# Patient Record
Sex: MALE | Race: BLACK OR AFRICAN AMERICAN | Employment: FULL TIME | URBAN - METROPOLITAN AREA
[De-identification: names, ages, dates, MRNs, and addresses within clinical notes are randomized per-mention and may not be internally consistent; named-entity substitution may affect disease eponyms.]

---

## 2018-08-08 ENCOUNTER — HOSPITAL ENCOUNTER (EMERGENCY)
Age: 48
Discharge: HOME | End: 2018-08-08
Attending: EMERGENCY MEDICINE
Payer: COMMERCIAL

## 2018-08-08 ENCOUNTER — APPOINTMENT (OUTPATIENT)
Dept: GENERAL RADIOLOGY | Age: 48
End: 2018-08-08
Payer: COMMERCIAL

## 2018-08-08 VITALS
SYSTOLIC BLOOD PRESSURE: 169 MMHG | DIASTOLIC BLOOD PRESSURE: 134 MMHG | TEMPERATURE: 97.6 F | RESPIRATION RATE: 18 BRPM | HEART RATE: 71 BPM | OXYGEN SATURATION: 100 %

## 2018-08-08 DIAGNOSIS — J06.9 VIRAL UPPER RESPIRATORY TRACT INFECTION: Primary | ICD-10-CM

## 2018-08-08 DIAGNOSIS — I10 HYPERTENSION, UNSPECIFIED TYPE: ICD-10-CM

## 2018-08-08 LAB
ANION GAP SERPL CALCULATED.3IONS-SCNC: 11 MMOL/L (ref 3–16)
BASOPHILS ABSOLUTE: 0 K/UL (ref 0–0.2)
BASOPHILS RELATIVE PERCENT: 0.6 %
BUN BLDV-MCNC: 12 MG/DL (ref 7–20)
CALCIUM SERPL-MCNC: 9.8 MG/DL (ref 8.3–10.6)
CHLORIDE BLD-SCNC: 104 MMOL/L (ref 99–110)
CO2: 26 MMOL/L (ref 21–32)
CREAT SERPL-MCNC: 0.9 MG/DL (ref 0.9–1.3)
EOSINOPHILS ABSOLUTE: 0.2 K/UL (ref 0–0.6)
EOSINOPHILS RELATIVE PERCENT: 2.8 %
GFR AFRICAN AMERICAN: >60
GFR NON-AFRICAN AMERICAN: >60
GLUCOSE BLD-MCNC: 101 MG/DL (ref 70–99)
HCT VFR BLD CALC: 44.5 % (ref 40.5–52.5)
HEMOGLOBIN: 14.8 G/DL (ref 13.5–17.5)
LYMPHOCYTES ABSOLUTE: 1.6 K/UL (ref 1–5.1)
LYMPHOCYTES RELATIVE PERCENT: 29 %
MCH RBC QN AUTO: 29.3 PG (ref 26–34)
MCHC RBC AUTO-ENTMCNC: 33.2 G/DL (ref 31–36)
MCV RBC AUTO: 88.3 FL (ref 80–100)
MONOCYTES ABSOLUTE: 0.5 K/UL (ref 0–1.3)
MONOCYTES RELATIVE PERCENT: 9.1 %
NEUTROPHILS ABSOLUTE: 3.2 K/UL (ref 1.7–7.7)
NEUTROPHILS RELATIVE PERCENT: 58.5 %
PDW BLD-RTO: 14.1 % (ref 12.4–15.4)
PLATELET # BLD: 174 K/UL (ref 135–450)
PMV BLD AUTO: 9.4 FL (ref 5–10.5)
POTASSIUM SERPL-SCNC: 3.8 MMOL/L (ref 3.5–5.1)
RBC # BLD: 5.04 M/UL (ref 4.2–5.9)
SODIUM BLD-SCNC: 141 MMOL/L (ref 136–145)
WBC # BLD: 5.4 K/UL (ref 4–11)

## 2018-08-08 PROCEDURE — 36415 COLL VENOUS BLD VENIPUNCTURE: CPT

## 2018-08-08 PROCEDURE — 71046 X-RAY EXAM CHEST 2 VIEWS: CPT

## 2018-08-08 PROCEDURE — 99283 EMERGENCY DEPT VISIT LOW MDM: CPT

## 2018-08-08 PROCEDURE — 80048 BASIC METABOLIC PNL TOTAL CA: CPT

## 2018-08-08 PROCEDURE — 85025 COMPLETE CBC W/AUTO DIFF WBC: CPT

## 2018-08-08 RX ORDER — AMLODIPINE BESYLATE 5 MG/1
5 TABLET ORAL DAILY
Qty: 30 TABLET | Refills: 3 | Status: ON HOLD | OUTPATIENT
Start: 2018-08-08 | End: 2021-12-03 | Stop reason: HOSPADM

## 2018-08-08 ASSESSMENT — ENCOUNTER SYMPTOMS
SORE THROAT: 1
GASTROINTESTINAL NEGATIVE: 1
TROUBLE SWALLOWING: 0
SHORTNESS OF BREATH: 0
COUGH: 1

## 2018-08-08 NOTE — ED PROVIDER NOTES
[address] [facility] [address] ENCOUNTER          ATTENDING PHYSICIAN NOTE       Date of evaluation: 8/8/2018    Chief Complaint     Hypertension      History of Present Illness     Dewey Ibanez is a 50 y.o. male who presents to the emergency department complaining of cough and congestion as well as high blood pressure. Patient states over the last 3 days he has been having symptoms of cough and congestion. He has been using over-the-counter cough medication for this without improvement of symptoms. He states today he felt like his blood pressure was elevated. He cannot give me any specific symptoms to say why he felt this but was concerned so came to the emergency department for evaluation. He denies any headache or blurry vision. He denies any chest pain or pressure. He denies any increased or decreased urine output. He states that he had a work physical partial year ago and had a normal blood pressure at this time but does not regularly follow with a primary care provider. Review of Systems     Review of Systems   Constitutional: Positive for fatigue. Negative for chills and fever. HENT: Positive for congestion and sore throat. Negative for trouble swallowing. Respiratory: Positive for cough. Negative for shortness of breath. Cardiovascular: Negative. Gastrointestinal: Negative. Genitourinary: Negative. Musculoskeletal: Negative. Neurological: Negative. All other systems reviewed and are negative. Past Medical, Surgical, Family, and Social History     He has no past medical history on file. He has no past surgical history on file. His family history is not on file. He     Medications     Discharge Medication List as of 8/8/2018  2:43 AM          Allergies     He has No Known Allergies.     Physical Exam     INITIAL VITALS: BP: (!) 169/134, Temp: 97.6 °F (36.4 °C), Pulse: 71, Resp: 18, SpO2: 100 %   Physical Exam   Constitutional: He is oriented to person, Sodium 141 136 - 145 mmol/L    Potassium 3.8 3.5 - 5.1 mmol/L    Chloride 104 99 - 110 mmol/L    CO2 26 21 - 32 mmol/L    Anion Gap 11 3 - 16    Glucose 101 (H) 70 - 99 mg/dL    BUN 12 7 - 20 mg/dL    CREATININE 0.9 0.9 - 1.3 mg/dL    GFR Non-African American >60 >60    GFR African American >60 >60    Calcium 9.8 8.3 - 10.6 mg/dL     RECENT VITALS:  BP: (!) 169/134, Temp: 97.6 °F (36.4 °C), Pulse: 71, Resp: 18, SpO2: 100 %     Procedures     N/A    ED Course     Nursing Notes, Past Medical Hx, Past Surgical Hx, Social Hx, Allergies, and Family Hx were reviewed. The patient was given the following medications:  Orders Placed This Encounter   Medications    amLODIPine (NORVASC) 5 MG tablet     Sig: Take 1 tablet by mouth daily     Dispense:  30 tablet     Refill:  3    Dextromethorphan-guaiFENesin (CORICIDIN HBP CONGESTION/COUGH)  MG CAPS     Sig: Take 1 capsule by mouth every 6 hours as needed (Congestion, cough)     Dispense:  20 capsule     Refill:  0       CONSULTS:  None    MEDICAL DECISION MAKING / ASSESSMENT / Colletta Hi is a 50 y.o. male who presents to the emergency department complaining of cough and nasal congestion that has been going on for 3 days. Patient's symptoms are most consistent with an upper respiratory tract infection. He has no evidence of oropharyngeal abnormalities on exam.  Chest x-ray shows no evidence of pneumonia. Patient will be treated symptomatically for this. Patient was also noted to have elevated blood pressure in the emergency department. He states that his blood pressure was normal on his last work physical but does not routinely follow with a primary care provider. Patient has normal renal function. I will start the patient on Norvasc for blood pressure control and he will be referred to the [facility] resident clinic or follow-up with a primary care provider of his choice for recheck of his blood pressure. Clinical Impression     1.  Viral

## 2019-10-18 ENCOUNTER — HOSPITAL ENCOUNTER (OUTPATIENT)
Dept: GENERAL RADIOLOGY | Age: 49
Discharge: HOME | End: 2019-10-18
Payer: COMMERCIAL

## 2019-10-18 ENCOUNTER — HOSPITAL ENCOUNTER (OUTPATIENT)
Age: 49
Discharge: HOME | End: 2019-10-18
Payer: COMMERCIAL

## 2019-10-18 DIAGNOSIS — R05.9 COUGH: ICD-10-CM

## 2019-10-18 DIAGNOSIS — R50.9 FEVER, UNKNOWN ORIGIN: ICD-10-CM

## 2019-10-18 PROCEDURE — 71046 X-RAY EXAM CHEST 2 VIEWS: CPT

## 2019-10-31 ENCOUNTER — OFFICE VISIT (OUTPATIENT)
Dept: CARDIOLOGY CLINIC | Age: 49
End: 2019-10-31
Payer: COMMERCIAL

## 2019-10-31 VITALS
WEIGHT: 250 LBS | DIASTOLIC BLOOD PRESSURE: 70 MMHG | BODY MASS INDEX: 33.13 KG/M2 | OXYGEN SATURATION: 98 % | HEART RATE: 68 BPM | RESPIRATION RATE: 16 BRPM | SYSTOLIC BLOOD PRESSURE: 120 MMHG | HEIGHT: 73 IN

## 2019-10-31 DIAGNOSIS — I48.0 PAROXYSMAL ATRIAL FIBRILLATION (HCC): ICD-10-CM

## 2019-10-31 DIAGNOSIS — I49.9 IRREGULAR HEART BEAT: Primary | ICD-10-CM

## 2019-10-31 PROCEDURE — 93000 ELECTROCARDIOGRAM COMPLETE: CPT | Performed by: NURSE PRACTITIONER

## 2019-10-31 PROCEDURE — 99204 OFFICE O/P NEW MOD 45 MIN: CPT | Performed by: NURSE PRACTITIONER

## 2019-10-31 RX ORDER — CHLORTHALIDONE 25 MG/1
TABLET ORAL
Refills: 3 | Status: ON HOLD | COMMUNITY
Start: 2019-10-21 | End: 2021-12-03 | Stop reason: HOSPADM

## 2019-10-31 SDOH — HEALTH STABILITY: MENTAL HEALTH: HOW OFTEN DO YOU HAVE A DRINK CONTAINING ALCOHOL?: NEVER

## 2019-11-07 ENCOUNTER — HOSPITAL ENCOUNTER (OUTPATIENT)
Dept: NON INVASIVE DIAGNOSTICS | Age: 49
Discharge: HOME | End: 2019-11-07
Payer: COMMERCIAL

## 2019-11-07 DIAGNOSIS — I48.0 PAROXYSMAL ATRIAL FIBRILLATION (HCC): ICD-10-CM

## 2019-11-07 LAB
LV EF: 55 %
LVEF MODALITY: NORMAL

## 2019-11-07 PROCEDURE — 93306 TTE W/DOPPLER COMPLETE: CPT

## 2020-01-14 NOTE — PROGRESS NOTES
Kali Cho MD   Dextromethorphan-guaiFENesin (CORICIDIN HBP CONGESTION/COUGH)  MG CAPS Take 1 capsule by mouth every 6 hours as needed (Congestion, cough)  Patient not taking: Reported on 10/31/2019 8/8/18   Pranav Brannon MD       Social History:   reports that he has never smoked. He has never used smokeless tobacco. He reports previous alcohol use. He reports previous drug use. Family History:  family history is not on file. Reviewed. Denies family history of sudden cardiac death, arrhythmia, premature CAD    Review of System:    · General ROS: negative for - chills, fever   · Psychological ROS: negative for - anxiety or depression  · Ophthalmic ROS: negative for - eye pain or loss of vision  · ENT ROS: negative for - epistaxis, headaches, nasal discharge, sore throat   · Allergy and Immunology ROS: negative for - hives, nasal congestion   · Hematological and Lymphatic ROS: negative for - bleeding problems, blood clots, bruising or jaundice  · Endocrine ROS: negative for - skin changes, temperature intolerance or unexpected weight changes  · Respiratory ROS: negative for - cough, hemoptysis, pleuritic pain, SOB, sputum changes or wheezing  · Cardiovascular ROS: Per HPI. · Gastrointestinal ROS: negative for - abdominal pain, blood in stools, diarrhea, hematemesis, melena, nausea/vomiting or swallowing difficulty/pain  · Genito-Urinary ROS: negative for - dysuria or incontinence  · Musculoskeletal ROS: negative for - joint swelling or muscle pain  · Neurological ROS: negative for - confusion, dizziness, gait disturbance, headaches, numbness/tingling, seizures, speech problems, tremors, visual changes or weakness  · Dermatological ROS: negative for - rash    Physical Examination:  Vitals:    01/16/20 0942   BP: 106/78   Pulse: 79       · Constitutional: Oriented. No distress. · Head: Normocephalic and atraumatic.    · Mouth/Throat: Oropharynx is clear and moist.   · Eyes: Conjunctivae normal. EOM are normal.   · Neck: Normal range of motion. Neck supple. No rigidity. No JVD present. · Cardiovascular: Normal rate, regular rhythm, S1&S2 and intact distal pulses. · Pulmonary/Chest: Bilateral respiratory sounds. No wheezes. No rhonchi. · Abdominal: Soft. Bowel sounds present. No distension, No tenderness. · Musculoskeletal: No tenderness. No edema    · Lymphadenopathy: Has no cervical adenopathy. · Neurological: Alert and oriented. Cranial nerve appears intact, No Gross deficit   · Skin: Skin is warm and dry. No rash noted. · Psychiatric: Has a normal mood, affect and behavior     Labs:  Reviewed. ECG: reviewed, controlled atrial fibrillation  Studies:   1. Event monitor:  none    2. Echo 11/7/19:   Summary   Normal left ventricular size and wall thickness. Normal left ventricular systolic function with an estimate ejection fraction   of 55%. There are no regional wall motion abnormalities. Indeterminate diastolic function. Bi-atrial enlargement. Estimated pulmonary artery systolic pressure is 18 mmHg assuming a right   atrial pressure of 3 mmHg. No prior echo for comparison. 3. Stress Test:  none    4. Cath:  none    The MCOT, echocardiogram, stress test, and coronary angiography/PCI were reviewed by myself and used for my plan of care. Procedures:  1. DCCV    Assessment/Plan:   1.  AF  2. HTN    Paroxysmal atrial fibrillation, minimally symptomatic  In AF today, rate controlled  Echo (11/2019) with EF of 55% and AKIRA. Will plan for DCCV on 1/21/20  Has been on Eliquis since 10/2019    Follow up 1 month after DCCV to reassess the symptomatology    Thank you for allowing me to participate in the care of Ann Baird. All questions and concerns were addressed to the patient/family. Alternatives to my treatment were discussed. Antwon Fernández RN, am scribing for and in the presence of Dr. Carol Young.  01/16/20 10:30 AM  MORGAN Irizarry

## 2020-01-16 ENCOUNTER — OFFICE VISIT (OUTPATIENT)
Dept: CARDIOLOGY CLINIC | Age: 50
End: 2020-01-16
Payer: COMMERCIAL

## 2020-01-16 ENCOUNTER — PREP FOR PROCEDURE (OUTPATIENT)
Dept: CARDIOLOGY CLINIC | Age: 50
End: 2020-01-16

## 2020-01-16 VITALS
HEIGHT: 77 IN | DIASTOLIC BLOOD PRESSURE: 78 MMHG | HEART RATE: 79 BPM | BODY MASS INDEX: 28.43 KG/M2 | WEIGHT: 240.8 LBS | SYSTOLIC BLOOD PRESSURE: 106 MMHG

## 2020-01-16 PROCEDURE — 93000 ELECTROCARDIOGRAM COMPLETE: CPT | Performed by: INTERNAL MEDICINE

## 2020-01-16 PROCEDURE — 99214 OFFICE O/P EST MOD 30 MIN: CPT | Performed by: INTERNAL MEDICINE

## 2020-01-16 RX ORDER — MIDAZOLAM HYDROCHLORIDE 1 MG/ML
1 INJECTION INTRAMUSCULAR; INTRAVENOUS ONCE
Status: CANCELLED | OUTPATIENT
Start: 2020-01-21

## 2020-01-16 RX ORDER — SODIUM CHLORIDE 0.9 % (FLUSH) 0.9 %
10 SYRINGE (ML) INJECTION EVERY 12 HOURS SCHEDULED
Status: CANCELLED | OUTPATIENT
Start: 2020-01-16

## 2020-01-16 RX ORDER — SODIUM CHLORIDE 9 MG/ML
INJECTION, SOLUTION INTRAVENOUS CONTINUOUS
Status: CANCELLED | OUTPATIENT
Start: 2020-01-16

## 2020-01-16 RX ORDER — SODIUM CHLORIDE 0.9 % (FLUSH) 0.9 %
10 SYRINGE (ML) INJECTION PRN
Status: CANCELLED | OUTPATIENT
Start: 2020-01-16

## 2020-01-17 PROBLEM — I10 ESSENTIAL HYPERTENSION: Status: ACTIVE | Noted: 2020-01-17

## 2020-01-17 PROBLEM — I48.0 PAROXYSMAL ATRIAL FIBRILLATION (HCC): Status: ACTIVE | Noted: 2020-01-17

## 2020-01-20 ENCOUNTER — TELEPHONE (OUTPATIENT)
Dept: CARDIOLOGY CLINIC | Age: 50
End: 2020-01-20

## 2020-01-20 NOTE — TELEPHONE ENCOUNTER
Pt notified the hospital RN that he would like to cancel his DCCV for tomorrow. Called pt and rescheduled DCCV for 1/31/20 at 0800, arriving at [facility] at [address]. Pt is aware that all other instructions given to him at the [address] remain the same. Jasmina from cath lab aware of the change.

## 2020-01-22 ENCOUNTER — PREP FOR PROCEDURE (OUTPATIENT)
Dept: CARDIOLOGY CLINIC | Age: 50
End: 2020-01-22

## 2020-01-29 NOTE — TELEPHONE ENCOUNTER
Pt called back. The number we had on file was old. Updated our records with new number. Pt is not able to easily reschedule DCCV due to his work schedule. MIKE and Estrella Verdin are both agreeable to Estrella Verdin performing the DCCV. Will keep the originally scheduled DCCV for 1/31/20 at 0800 per Estrella Verdin. Pt aware and agreeable to the change. Reviewed preop instructions with the pt given at previous OV.

## 2020-01-30 ENCOUNTER — PRE-PROCEDURE TELEPHONE (OUTPATIENT)
Dept: CARDIAC CATH/INVASIVE PROCEDURES | Age: 50
End: 2020-01-30

## 2020-01-30 NOTE — PROGRESS NOTES
Called patient about procedure. Told to be here at 0630 for procedure at 0800. NPO after midnight, but can take morning medication with sips of water, patient stated they are on xarelto and have not skipped a dose. To have a responsible adult be with patient take them home and stay with them afterwards, if they do not get admitted to [address] [place] [address]. And if available bring current list of medications. No other questions or concerns.

## 2020-01-31 ENCOUNTER — HOSPITAL ENCOUNTER (OUTPATIENT)
Dept: CARDIAC CATH/INVASIVE PROCEDURES | Age: 50
Discharge: HOME | End: 2020-02-02
Payer: COMMERCIAL

## 2020-01-31 VITALS — TEMPERATURE: 97.5 F | WEIGHT: 235 LBS | OXYGEN SATURATION: 100 % | HEIGHT: 77 IN | BODY MASS INDEX: 27.75 KG/M2

## 2020-01-31 LAB
ANION GAP SERPL CALCULATED.3IONS-SCNC: 15 MMOL/L (ref 3–16)
BUN BLDV-MCNC: 20 MG/DL (ref 7–20)
CALCIUM SERPL-MCNC: 9.4 MG/DL (ref 8.3–10.6)
CHLORIDE BLD-SCNC: 101 MMOL/L (ref 99–110)
CO2: 23 MMOL/L (ref 21–32)
CREAT SERPL-MCNC: 1.2 MG/DL (ref 0.9–1.3)
EKG ATRIAL RATE: 375 BPM
EKG ATRIAL RATE: 71 BPM
EKG DIAGNOSIS: NORMAL
EKG DIAGNOSIS: NORMAL
EKG P AXIS: 48 DEGREES
EKG P-R INTERVAL: 220 MS
EKG Q-T INTERVAL: 376 MS
EKG Q-T INTERVAL: 412 MS
EKG QRS DURATION: 72 MS
EKG QRS DURATION: 74 MS
EKG QTC CALCULATION (BAZETT): 428 MS
EKG QTC CALCULATION (BAZETT): 447 MS
EKG R AXIS: -50 DEGREES
EKG R AXIS: 57 DEGREES
EKG T AXIS: 62 DEGREES
EKG T AXIS: 66 DEGREES
EKG VENTRICULAR RATE: 71 BPM
EKG VENTRICULAR RATE: 78 BPM
GFR AFRICAN AMERICAN: >60
GFR NON-AFRICAN AMERICAN: >60
GLUCOSE BLD-MCNC: 105 MG/DL (ref 70–99)
INR BLD: 1.26 (ref 0.86–1.14)
POTASSIUM SERPL-SCNC: 3.6 MMOL/L (ref 3.5–5.1)
PROTHROMBIN TIME: 14.6 SEC (ref 10–13.2)
SODIUM BLD-SCNC: 139 MMOL/L (ref 136–145)

## 2020-01-31 PROCEDURE — 92960 CARDIOVERSION ELECTRIC EXT: CPT | Performed by: INTERNAL MEDICINE

## 2020-01-31 PROCEDURE — 93005 ELECTROCARDIOGRAM TRACING: CPT | Performed by: INTERNAL MEDICINE

## 2020-01-31 PROCEDURE — 94761 N-INVAS EAR/PLS OXIMETRY MLT: CPT

## 2020-01-31 PROCEDURE — 94770 HC ETCO2 MONITOR DAILY: CPT

## 2020-01-31 PROCEDURE — 93010 ELECTROCARDIOGRAM REPORT: CPT | Performed by: INTERNAL MEDICINE

## 2020-01-31 PROCEDURE — 85610 PROTHROMBIN TIME: CPT

## 2020-01-31 PROCEDURE — 80048 BASIC METABOLIC PNL TOTAL CA: CPT

## 2020-01-31 PROCEDURE — 92960 CARDIOVERSION ELECTRIC EXT: CPT

## 2020-01-31 PROCEDURE — 94664 DEMO&/EVAL PT USE INHALER: CPT

## 2020-01-31 PROCEDURE — 2700000000 HC OXYGEN THERAPY PER DAY

## 2020-01-31 RX ORDER — SODIUM CHLORIDE 0.9 % (FLUSH) 0.9 %
10 SYRINGE (ML) INJECTION PRN
Status: DISCONTINUED | OUTPATIENT
Start: 2020-01-31 | End: 2020-02-03 | Stop reason: HOSPADM

## 2020-01-31 RX ORDER — LOSARTAN POTASSIUM 100 MG/1
100 TABLET ORAL DAILY
Status: ON HOLD | COMMUNITY
End: 2021-12-03 | Stop reason: HOSPADM

## 2020-01-31 RX ORDER — SODIUM CHLORIDE 0.9 % (FLUSH) 0.9 %
10 SYRINGE (ML) INJECTION EVERY 12 HOURS SCHEDULED
Status: DISCONTINUED | OUTPATIENT
Start: 2020-01-31 | End: 2020-02-03 | Stop reason: HOSPADM

## 2020-01-31 RX ORDER — MIDAZOLAM HYDROCHLORIDE 1 MG/ML
1 INJECTION INTRAMUSCULAR; INTRAVENOUS ONCE
Status: DISCONTINUED | OUTPATIENT
Start: 2020-01-31 | End: 2020-02-03 | Stop reason: HOSPADM

## 2020-01-31 RX ORDER — SODIUM CHLORIDE 9 MG/ML
INJECTION, SOLUTION INTRAVENOUS CONTINUOUS
Status: DISCONTINUED | OUTPATIENT
Start: 2020-01-31 | End: 2020-02-03 | Stop reason: HOSPADM

## 2020-01-31 NOTE — PROGRESS NOTES
ETCO2  Monitoring done for conscious sedation. Patient is on 2 liters/min via nasal cannula for procedure.     Baseline information:  HR: 64   RR: 17 LOC: alert  ETCO2: 37 SpO2: 100    5 minutes after sedation administered:  HR: 99   RR: 18 LOC: lethargic  ETCO2: 34 SpO2: 100    Post-Procedure:  HR: 65   RR: 16 LOC: alert  ETCO2: 38 SpO2: 100  well    Patient/caregiver was educated on the proper method of use:  Yes      Level of patient/caregiver understanding able to:   [x] Verbalize understanding   [] Demonstrate understanding       [] Teach back        [] Needs reinforcement       []  No available caregiver               []  Other:     Response to education:  Good

## 2020-01-31 NOTE — H&P
Dextromethorphan-guaiFENesin (CORICIDIN HBP CONGESTION/COUGH)  MG CAPS Take 1 capsule by mouth every 6 hours as needed (Congestion, cough)  Patient not taking: Reported on 10/31/2019 8/8/18     Dudley Koroma MD            Social History:   reports that he has never smoked. He has never used smokeless tobacco. He reports previous alcohol use. He reports previous drug use.         Family History:  family history is not on file. Reviewed. Denies family history of sudden cardiac death, arrhythmia, premature CAD     Review of System:     · General ROS: negative for - chills, fever   · Psychological ROS: negative for - anxiety or depression  · Ophthalmic ROS: negative for - eye pain or loss of vision  · ENT ROS: negative for - epistaxis, headaches, nasal discharge, sore throat   · Allergy and Immunology ROS: negative for - hives, nasal congestion   · Hematological and Lymphatic ROS: negative for - bleeding problems, blood clots, bruising or jaundice  · Endocrine ROS: negative for - skin changes, temperature intolerance or unexpected weight changes  · Respiratory ROS: negative for - cough, hemoptysis, pleuritic pain, SOB, sputum changes or wheezing  · Cardiovascular ROS: Per HPI. · Gastrointestinal ROS: negative for - abdominal pain, blood in stools, diarrhea, hematemesis, melena, nausea/vomiting or swallowing difficulty/pain  · Genito-Urinary ROS: negative for - dysuria or incontinence  · Musculoskeletal ROS: negative for - joint swelling or muscle pain  · Neurological ROS: negative for - confusion, dizziness, gait disturbance, headaches, numbness/tingling, seizures, speech problems, tremors, visual changes or weakness  · Dermatological ROS: negative for - rash     Physical Examination:      Vitals:     01/16/20 0942   BP: 106/78   Pulse: 79         · Constitutional: Oriented. No distress. · Head: Normocephalic and atraumatic.    · Mouth/Throat: Oropharynx is clear and moist.   · Eyes: Conjunctivae normal.

## 2020-01-31 NOTE — SEDATION DOCUMENTATION
Sedation start time: 1106  Sedation stop time: 1117  Mallampati: 2  Pre and post Archana score: 10/10  Medication given: IV Versed, IV brevital  Pre-Procedure Assessment / Plan:  ASA: Class 2 - A normal healthy patient with mild systemic disease  Level of Sedation Plan:Deep sedation  Post Procedure plan: Return to same level of care

## 2020-02-27 NOTE — PROGRESS NOTES
[address]   Cardiac Electrophysiology Consultation   Date: 2/27/2020  Reason for Consultation:  Atrial Fibrillation  Consult Requesting Physician: No att. providers found     Chief Complaint:   No chief complaint on file. HPI: Erin Gutierrez is a 48 y.o. man referred by Dr. Jayden Kaur for AF. PMH of HTN. On 10/18/19, pt was at Dr. Jonna Lucas office with 3 day h/o fever/chills, SOB, and dry cough, treated for an URI.  ECG at that OV found AF, rate controlled. He denied CP, palpitations, heart racing, dizziness, lightheadedness, or change in energy. ECG in f/u on 10/31/19 also showed rate controlled AF, asymptomatic, started on Xarelto.  Echo (11/2019) with EF of 55% and AKIRA. S/p DCCV on 1/31/20. Interval History: Today, he is here for 1 month f/u after DCCV, presenting in [employer] [facility]ACKLYN Abel Denies complaints of palpitations, dizziness, CP, SOB, orthopnea, presyncope, or syncope. 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 He continues to be to be active. He plays sports without any functional limitations. Atrial Fibrillation:    BMI    :   There is no height or weight on file to calculate BMI. Duration   :   10/1819    Symptoms   :  Asymptomatic    Previous DCCV :  1/31/20    Previous AAD           :   none    Beta blocker  :   none    ACE / ARB  :   losartan    OAC   :   Xarelto    LVEF    :   55%    Left atrial size :   4.0 cm    AC   :   Not tested    No past medical history on file. No past surgical history on file. Allergies:  No Known Allergies    Medication:   Prior to Admission medications    Medication Sig Start Date End Date Taking? Authorizing Provider   losartan (COZAAR) 100 MG tablet Take 100 mg by mouth daily    Historical Provider, MD   chlorthalidone (HYGROTON) 25 MG tablet TAKE 1 TABLET ON MONDAY. .. [facility] AND FRIDAY 10/21/19   Historical Provider, MD   rivaroxaban (XARELTO) 20 MG TABS tablet Take 1 tablet by mouth daily (with breakfast) 10/31/19   Nigel Carrillo, TITA - CNP   amLODIPine (NORVASC) 5 MG tablet Take 1 tablet by mouth daily 8/8/18   Pranav Brannon MD   Dextromethorphan-guaiFENesin (CORICIDIN HBP CONGESTION/COUGH)  MG CAPS Take 1 capsule by mouth every 6 hours as needed (Congestion, cough)  Patient not taking: Reported on 10/31/2019 8/8/18   Pranav Brannon MD       Social History:   reports that he has never smoked. He has never used smokeless tobacco. He reports previous alcohol use. He reports previous drug use. Family History:  family history is not on file. Reviewed. Denies family history of sudden cardiac death, arrhythmia, premature CAD    Review of System:    · General ROS: negative for - chills, fever   · Psychological ROS: negative for - anxiety or depression  · Ophthalmic ROS: negative for - eye pain or loss of vision  · ENT ROS: negative for - epistaxis, headaches, nasal discharge, sore throat   · Allergy and Immunology ROS: negative for - hives, nasal congestion   · Hematological and Lymphatic ROS: negative for - bleeding problems, blood clots, bruising or jaundice  · Endocrine ROS: negative for - skin changes, temperature intolerance or unexpected weight changes  · Respiratory ROS: negative for - cough, hemoptysis, pleuritic pain, SOB, sputum changes or wheezing  · Cardiovascular ROS: Per HPI. · Gastrointestinal ROS: negative for - abdominal pain, blood in stools, diarrhea, hematemesis, melena, nausea/vomiting or swallowing difficulty/pain  · Genito-Urinary ROS: negative for - dysuria or incontinence  · Musculoskeletal ROS: negative for - joint swelling or muscle pain  · Neurological ROS: negative for - confusion, dizziness, gait disturbance, headaches, numbness/tingling, seizures, speech problems, tremors, visual changes or weakness  · Dermatological ROS: negative for - rash    Physical Examination:  There were no vitals filed for this visit. · Constitutional: Oriented. No distress. · Head: Normocephalic and atraumatic. · Mouth/Throat: Oropharynx is clear and moist.   · Eyes: Conjunctivae normal. EOM are normal.   · Neck: Normal range of motion. Neck supple. No rigidity. No JVD present. · Cardiovascular: Normal rate, regular rhythm, S1&S2 and intact distal pulses. · Pulmonary/Chest: Bilateral respiratory sounds. No wheezes. No rhonchi. · Abdominal: Soft. Bowel sounds present. No distension, No tenderness. · Musculoskeletal: No tenderness. No edema    · Lymphadenopathy: Has no cervical adenopathy. · Neurological: Alert and oriented. Cranial nerve appears intact, No Gross deficit   · Skin: Skin is warm and dry. No rash noted. · Psychiatric: Has a normal mood, affect and behavior     Labs:  Reviewed. ECG: reviewed, controlled atrial fibrillation    Studies:   1. Event monitor:  none    2. Echo 11/7/19:   Summary   Normal left ventricular size and wall thickness. Normal left ventricular systolic function with an estimate ejection fraction   of 55%. There are no regional wall motion abnormalities. Indeterminate diastolic function. Bi-atrial enlargement. Estimated pulmonary artery systolic pressure is 18 mmHg assuming a right   atrial pressure of 3 mmHg. No prior echo for comparison. 3. Stress Test:  none    4. Cath:  none    The MCOT, echocardiogram, stress test, and coronary angiography/PCI were reviewed by myself and used for my plan of care. Procedures:  1. Assessment/Plan:   1.  AF  2. HTN    Paroxysmal atrial fibrillation, minimally symptomatic  In AF today, rate controlled  Echo (11/2019) with EF of 55% and AKIRA. S/p DCCV 1/31/20      Follow up     Thank you for allowing me to participate in the care of Black Jade. All questions and concerns were addressed to the patient/family. Alternatives to my treatment were discussed.            Shira Alarcon MD  Cardiac Electrophysiology  [address]

## 2020-03-03 ENCOUNTER — OFFICE VISIT (OUTPATIENT)
Dept: CARDIOLOGY CLINIC | Age: 50
End: 2020-03-03
Payer: COMMERCIAL

## 2020-03-03 VITALS
BODY MASS INDEX: 28.5 KG/M2 | SYSTOLIC BLOOD PRESSURE: 124 MMHG | WEIGHT: 241.4 LBS | HEART RATE: 63 BPM | DIASTOLIC BLOOD PRESSURE: 88 MMHG | HEIGHT: 77 IN

## 2020-03-03 PROCEDURE — 99215 OFFICE O/P EST HI 40 MIN: CPT | Performed by: INTERNAL MEDICINE

## 2020-03-03 PROCEDURE — 93000 ELECTROCARDIOGRAM COMPLETE: CPT | Performed by: INTERNAL MEDICINE

## 2020-03-03 NOTE — PROGRESS NOTES
[facility]   Cardiac Electrophysiology Consultation   Date: 3/3/2020  Reason for Consultation:  Atrial Fibrillation  Consult Requesting Physician: No att. providers found     Chief Complaint:   Chief Complaint   Patient presents with    Atrial Fibrillation      HPI: Jose Woodruff is a 48 y.o. man referred by Dr. Lynn Arteaga for AF. PMH of HTN. On 10/18/19, pt was at Dr. Yamel Lin office with 3 day h/o fever/chills, SOB, and dry cough, treated for an URI.  ECG at that OV found AF, rate controlled. He denied CP, palpitations, heart racing, dizziness, lightheadedness, or change in energy. ECG in f/u on 10/31/19 also showed rate controlled AF, started on Xarelto.  Echo (11/2019) with EF of 55% and AKIRA. S/p DCCV on 1/31/20. Interval History: Today, he is here for 1 month f/u after DCCV, presenting in [employer]. Patient does feel he has more energy after DCCV. Denies complaints of palpitations, dizziness, CP, SOB, orthopnea, presyncope, or syncope. 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 He continues to be to be active. He plays sports without any functional limitations. Atrial Fibrillation:    BMI    :   Body mass index is 28.63 kg/m². Duration   :   10/1819    Symptoms   : Decline in his functional capacity    Previous DCCV :  1/31/20    Previous AAD           :   none    Beta blocker  :   none    ACE / ARB  :   losartan    OAC   :   Xarelto    LVEF    :   55%    Left atrial size :   4.0 cm    AC   :   Not tested    No past medical history on file. No past surgical history on file. Allergies:  No Known Allergies    Medication:   Prior to Admission medications    Medication Sig Start Date End Date Taking? Authorizing Provider   losartan (COZAAR) 100 MG tablet Take 100 mg by mouth daily   Yes Historical Provider, MD   chlorthalidone (HYGROTON) 25 MG tablet TAKE 1 TABLET ON MONDAY. .. [facility] AND FRIDAY 10/21/19  Yes Historical Provider, MD   rivaroxaban (XARELTO) 20 MG TABS tablet Take 1 tablet by mouth daily (with breakfast) 10/31/19  Yes TITA Quinn CNP   amLODIPine (NORVASC) 5 MG tablet Take 1 tablet by mouth daily 8/8/18  Yes Ivonne Hardin MD   Dextromethorphan-guaiFENesin (CORICIDIN HBP CONGESTION/COUGH)  MG CAPS Take 1 capsule by mouth every 6 hours as needed (Congestion, cough) 8/8/18  Yes Ivonne Hardin MD       Social History:   reports that he has never smoked. He has never used smokeless tobacco. He reports previous alcohol use. He reports previous drug use. Family History:  family history is not on file. Reviewed. Denies family history of sudden cardiac death, arrhythmia, premature CAD    Review of System:    · General ROS: negative for - chills, fever   · Psychological ROS: negative for - anxiety or depression  · Ophthalmic ROS: negative for - eye pain or loss of vision  · ENT ROS: negative for - epistaxis, headaches, nasal discharge, sore throat   · Allergy and Immunology ROS: negative for - hives, nasal congestion   · Hematological and Lymphatic ROS: negative for - bleeding problems, blood clots, bruising or jaundice  · Endocrine ROS: negative for - skin changes, temperature intolerance or unexpected weight changes  · Respiratory ROS: negative for - cough, hemoptysis, pleuritic pain, SOB, sputum changes or wheezing  · Cardiovascular ROS: Per HPI. · Gastrointestinal ROS: negative for - abdominal pain, blood in stools, diarrhea, hematemesis, melena, nausea/vomiting or swallowing difficulty/pain  · Genito-Urinary ROS: negative for - dysuria or incontinence  · Musculoskeletal ROS: negative for - joint swelling or muscle pain  · Neurological ROS: negative for - confusion, dizziness, gait disturbance, headaches, numbness/tingling, seizures, speech problems, tremors, visual changes or weakness  · Dermatological ROS: negative for - rash    Physical Examination:  Vitals:    03/03/20 0916   BP: 124/88   Pulse: 63       · Constitutional: Oriented. No distress. atrial fibrillation ablation    After 3 months of successful ablation, it is possible to stop his oral anticoagulation and antiarrhythmic medications. We educated the patient that atrial fibrillation and atrial flutter are both progressive diseases, with more frequent episodes that will ensue. Subsequent episodes usually become more sustained to the extent that many individuals would then develop persistent atrial fibrillation/atrial flutter. Once persistence is reached, permanent atrial dysrhythmia is inevitable. Treatment options including cardioversion, anti-arrhythmic medication therapy, rate control strategy, oral anticoagulation, and atrial fibrillation ablation were discussed with patient. Risks, benefits and alternative of each treatment options were explained. We discussed different management options for both atrial tachydysrhythmia including their risks and benefits. These options include use of cardioversion (mainly for persisting atrial fibrillation or atrial flutter) which provides an effective immediate therapy with success rates of 75% or higher, but it provides no short nor long term efficacy. Anti-arrhythmic medications provide a very effective short term therapy, but even with our most potent anti-arrhythmic medication there is limited long term efficacy (clinical studies have shown that 40% of patients remain atrial fibrillation-free after 4 years of follow-up after starting one of the more powerful anti-arrhythmic medication (amiodarone), and, if extrapolated, may have further diminishing success as time goes on). Against atrial flutter, any anti-arrhythmic medication would be nearly ineffective. Atrial fibrillation and atrial flutter ablation is a potentially curative therapy with very reasonable success rate after a first time procedure and with improving success rates with subsequent procedures.      The risks, benefits and alternatives of the ablation procedure were discussed with the patient. The risks including, but not limited to, the risks of bleeding, infection, radiation exposure, injury to vascular, cardiac and surrounding structures (including pneumothorax), stroke, cardiac perforation, tamponade, need for emergent open heart surgery, need for pacemaker implantation, injury to the phrenic nerve, injury to the esophagus, myocardial infarction and death were discussed in detail. Discussed in detail about the risk factors, pathophysiology, and treatment options including life stye modification for atrial fibrillation. Treatment options including cardioversion, anti-arrhythmic medication therapy, rate control strategy, oral anticoagulation, and atrial fibrillation ablation were discussed with patient. Risks, benefits and alternative of each treatment options were explained. All questions answered. Will call patient to schedule ablation, would prefer a Monday. Patient needs to take 5 days off after procedure. Thank you for allowing me to participate in the care of Mayuri Estrella. All questions and concerns were addressed to the patient/family. Alternatives to my treatment were discussed. Asiya Urena RN, am scribing for and in the presence of Dr. Tiara Kuhn. 03/03/20 9:39 AM  Edmond Esposito RN.    Janis Morales MD, personally performed the services prescribed in this documentation as scribed by Ms. Galdino Holder RN in my presence and it is both accurate and complete.        Vinod Ho MD  Cardiac Electrophysiology  [place] [id number]

## 2020-03-06 ENCOUNTER — TELEPHONE (OUTPATIENT)
Dept: CARDIOLOGY CLINIC | Age: 50
End: 2020-03-06

## 2020-03-06 ENCOUNTER — PREP FOR PROCEDURE (OUTPATIENT)
Dept: CARDIOLOGY CLINIC | Age: 50
End: 2020-03-06

## 2020-03-06 RX ORDER — SODIUM CHLORIDE 0.9 % (FLUSH) 0.9 %
10 SYRINGE (ML) INJECTION PRN
Status: CANCELLED | OUTPATIENT
Start: 2020-03-06

## 2020-03-06 RX ORDER — SODIUM CHLORIDE 0.9 % (FLUSH) 0.9 %
10 SYRINGE (ML) INJECTION EVERY 12 HOURS SCHEDULED
Status: CANCELLED | OUTPATIENT
Start: 2020-03-06

## 2020-03-06 RX ORDER — SODIUM CHLORIDE 9 MG/ML
INJECTION, SOLUTION INTRAVENOUS CONTINUOUS
Status: CANCELLED | OUTPATIENT
Start: 2020-03-06

## 2020-03-06 NOTE — TELEPHONE ENCOUNTER
Electrophysiology Study (EPS) and Atrial Fibrillation (AFib) Ablation    Date of Procedure: 4/6/20    Time of Arrival: 06:00    Cardiologist performing procedure: Dr. Diya Argueta     · [address] at [facility]. through the main entrance. Check in at the Outpatient Diagnostic desk on the 1st floor. · Do not eat or drink anything after midnight the night before the procedure. · You may brush your teeth and rinse the morning of the procedure. · Take ALL of your routine medications the morning of the procedure. However, if you are taking diabetic medications, please HOLD on the day of the procedure (including insulin). If you take Lantus insulin, take HALF of your usual dose the night before. · Do NOT stop taking aspirin, Plavix, coumadin, Eliquis, Xarelto, or Pradaxa (any blood thinners). However, do not take blood thinner the morning of the procedure. · Do not apply any lotion, powder, or deodorant the morning of the procedure. · Please bring a list of your medications to the hospital with you. · You must have someone available to drive you home the next day. It is recommended that you do not drive for 24 hours after the procedure. · Lab work is due 3 days before the procedure. · CTA pulmonary is due 3 days before the procedure. · If you are unable to make this appointment, please call [facility] Cardiology at [phone].

## 2020-03-06 NOTE — TELEPHONE ENCOUNTER
Spoke with patient and scheduled ablation. Reviewed instructions and pre-op testing. Patient verbalized understanding.

## 2020-03-06 NOTE — LETTER
[employer]  EP Procedure Sheet  3/6/20    [place]  1970    Physician: Dr. Juan Lott     EP Procedures   Pacemaker implant X EP Study    ICD implant  Atrial flutter ablation     Biv implant X Atrial fibrillation ablation    Generator Change  SVT ablation    Lead revision  VT ablation    Lead extraction +/- upgrade  AVN ablation    Loop implant  Cardioversion     Other:   MATI     Equipment   Medtronic   ROSALIND Mapping System    [facility] [address]  CryoAblation    Biotronik  Laser Lead Extraction    Special Equipment       EP Procedures Scheduling Request  Time Requested  8:00   Specific Day 4/6/20   Anesthesia XXX   CT surgery backup    Location [facility]      Pre-Procedure Labs / Imaging   PT/INR  Type & cross    CBC  Units PRBC    BMP/Mg  Units FFP    Venogram  CXR    Echo  Pulmonary CTA for Pulmonary vein mapping       Date of last admission: > 30 days

## 2020-03-17 ENCOUNTER — TELEPHONE (OUTPATIENT)
Dept: CARDIOLOGY CLINIC | Age: 50
End: 2020-03-17

## 2020-03-17 NOTE — TELEPHONE ENCOUNTER
Called and LVM to minimize the pt's potential risk of exposure to COVID-19, we are postponing their afib ablation scheduled for 4/6/20.

## 2020-04-23 ENCOUNTER — TELEPHONE (OUTPATIENT)
Dept: CARDIOLOGY CLINIC | Age: 50
End: 2020-04-23

## 2020-04-27 ENCOUNTER — PREP FOR PROCEDURE (OUTPATIENT)
Dept: CARDIOLOGY CLINIC | Age: 50
End: 2020-04-27

## 2020-04-27 RX ORDER — SODIUM CHLORIDE 9 MG/ML
INJECTION, SOLUTION INTRAVENOUS CONTINUOUS
Status: CANCELLED | OUTPATIENT
Start: 2020-04-27

## 2020-04-27 RX ORDER — SODIUM CHLORIDE 0.9 % (FLUSH) 0.9 %
10 SYRINGE (ML) INJECTION EVERY 12 HOURS SCHEDULED
Status: CANCELLED | OUTPATIENT
Start: 2020-04-27

## 2020-04-27 RX ORDER — SODIUM CHLORIDE 0.9 % (FLUSH) 0.9 %
10 SYRINGE (ML) INJECTION PRN
Status: CANCELLED | OUTPATIENT
Start: 2020-04-27

## 2020-05-18 RX ORDER — SODIUM CHLORIDE 0.9 % (FLUSH) 0.9 %
10 SYRINGE (ML) INJECTION PRN
Status: CANCELLED | OUTPATIENT
Start: 2020-05-18

## 2020-05-18 RX ORDER — SODIUM CHLORIDE 0.9 % (FLUSH) 0.9 %
10 SYRINGE (ML) INJECTION EVERY 12 HOURS SCHEDULED
Status: CANCELLED | OUTPATIENT
Start: 2020-05-18

## 2020-05-18 RX ORDER — SODIUM CHLORIDE 9 MG/ML
INJECTION, SOLUTION INTRAVENOUS CONTINUOUS
Status: CANCELLED | OUTPATIENT
Start: 2020-05-18

## 2020-05-18 NOTE — TELEPHONE ENCOUNTER
Was finally able to get a hold of the pt. He wishes to push out the procedure until the end of July. R/s AF ablation for 7/24 at 0800, arriving at [facility] at 0600. Pt to call to get CTA scan scheduled 3-4 days prior to procedure. Cath lab aware of the date/time change. Will email updated instructions to pt. Pt verbalized understanding of instructions.

## 2020-07-14 ENCOUNTER — TELEPHONE (OUTPATIENT)
Dept: CARDIOLOGY CLINIC | Age: 50
End: 2020-07-14

## 2020-07-14 NOTE — TELEPHONE ENCOUNTER
Pt has historically been difficult to get a hold of and has r/s the ablation twice in the past.  LVM asking pt to call back to confirm that he is still planning on the AF ablation on 7/24 at 0730, arriving at [facility] at 0600. He still needs to get a CTA, labs, and be tested for COVID on 7/20.

## 2020-07-20 NOTE — TELEPHONE ENCOUNTER
3rd attempt to get a hold of patient. Left voicemail stating ablation is cancelled due to COVID testing not being done and will not be back in time for procedure. Also have attempted to talk to patient about pre-op testing multiple times. Left office phone number if patient wants to reschedule. Patient has a follow up with Claudia Wayne NP in September.

## 2020-07-29 ENCOUNTER — TELEPHONE (OUTPATIENT)
Dept: CARDIOLOGY CLINIC | Age: 50
End: 2020-07-29

## 2020-07-29 NOTE — TELEPHONE ENCOUNTER
The patient called requesting to reschedule his ablation procedure.  Please call the patient back at [phone]

## 2020-07-30 NOTE — TELEPHONE ENCOUNTER
Lets make an appointment with Reji Rincon, just to make sure about his plans and tie up any loose ends before ablation.      Thanks

## 2020-07-30 NOTE — TELEPHONE ENCOUNTER
Called and LVM asking patient to call back. Patient needs to see Shey Orr NP before scheduling afib ablation. Patient currently has an appointment on 9/2/20.

## 2020-07-30 NOTE — TELEPHONE ENCOUNTER
Patient would like to reschedule ablation, do you want to see him in the office first or go ahead and reschedule one more time?

## 2020-08-07 NOTE — TELEPHONE ENCOUNTER
Requested Prescriptions     Pending Prescriptions Disp Refills    rivaroxaban (XARELTO) 20 MG TABS tablet 30 tablet 5     Sig: Take 1 tablet by mouth daily (with breakfast)          Number: 30    Refills: 5    Last Office Visit: 3/3/2020     Next Office Visit: 9/2/2020

## 2020-08-10 NOTE — TELEPHONE ENCOUNTER
Pt called in stated he is reaching out to try to get procedure rescheduled. Pt would like for someone to call him to address this matter at [phone].  Thanks

## 2020-08-10 NOTE — TELEPHONE ENCOUNTER
Spoke with patient, told him we can reschedule his afib ablation at his appointment with Clare Vann NP on 9/2/20 at 10:00. Patient verbalized understanding.

## 2020-08-31 NOTE — PROGRESS NOTES
[address]   Electrophysiology  Office Visit  Date: 9/3/2020    Chief Complaint   Patient presents with    Atrial Fibrillation    Hypertension       Cardiac HX: Cesar Clark is a 48 y.o. man with a h/o HTN who is here with an incidental finding of AF at a f/u PCP visit. Interval History/HPI: Patient had originally been seen at his PCP office and noted to be in atrial fibrillation. He was sent to our office, placed on Xarelto and then scheduled for a cardioversion. He underwent cardioversion to normal sinus rhythm and presents in normal sinus rhythm today. He has been asymptomatic with his atrial fibrillation. He has not had any heart racing, palpitations or irregular heartbeats. He was unable to tell if he was out of rhythm and he did not notice any difference in his energy being back in sinus rhythm. His blood pressure is on the high side today. On recheck he was 128/92 on the left arm. He has not been checking his blood pressure at home. He does admit to not eating as well as he probably should. Home medications:   Current Outpatient Medications on File Prior to Visit   Medication Sig Dispense Refill    losartan (COZAAR) 100 MG tablet Take 100 mg by mouth daily      chlorthalidone (HYGROTON) 25 MG tablet TAKE 1 TABLET ON MONDAY. .. WEDNESDAY AND FRIDAY  3    amLODIPine (NORVASC) 5 MG tablet Take 1 tablet by mouth daily 30 tablet 3    Dextromethorphan-guaiFENesin (CORICIDIN HBP CONGESTION/COUGH)  MG CAPS Take 1 capsule by mouth every 6 hours as needed (Congestion, cough) 20 capsule 0     No current facility-administered medications on file prior to visit. No past medical history on file. No past surgical history on file. No Known Allergies    Social History:  Reviewed. reports that he has never smoked. He has never used smokeless tobacco. He reports previous alcohol use. He reports previous drug use. Family History:  Reviewed.  family history is not on file.     Review of System:    · Constitutional: No fevers, chills. · Eyes: No visual changes or diplopia. No scleral icterus. · ENT: No Headaches. No mouth sores or sore throat. · Cardiovascular: No for chest pain, No for dyspnea on exertion, No for palpitations or No for loss of consciousness. No cough, hemoptysis, No for pleuritic pain, or phlebitis. · Respiratory: No for cough or wheezing. No hematemesis. · Gastrointestinal: No abdominal pain, blood in stools. · Genitourinary: No dysuria, or hematuria. · Musculoskeletal: No gait disturbance,    · Integumentary: No rash or pruritis. · Neurological: No headache, change in muscle strength, numbness or tingling. · Psychiatric: No anxiety, or depression. · Endocrine: No temperature intolerance. No excessive thirst, fluid intake, or urination. · Hem/Lymph: No abnormal bruising or bleeding, blood clots or swollen lymph nodes. · Allergic/Immunologic: No nasal congestion or hives. Physical Examination:  Vitals:    09/03/20 1557   BP: 118/86   Pulse: Wt Readings from Last 3 Encounters:   09/03/20 254 lb 9.6 oz (115.5 kg)   03/03/20 241 lb 6.4 oz (109.5 kg)   01/31/20 235 lb (106.6 kg)     · Constitutional: Oriented. No distress. · Head: Normocephalic and atraumatic. · Mouth/Throat: Oropharynx is clear and moist.   · Eyes: Conjunctivae clear without jaunduice. PERRL. · Neck: Neck supple. No rigidity. No JVD present. · Cardiovascular: Normal rate, irregular rhythm, S1&S2. · Pulmonary/Chest: Bilateral respiratory sounds. No wheezes, No rhonchi. · Abdominal: Soft. Bowel sounds present. No distension, No tenderness. · Musculoskeletal: No tenderness. No edema    · Lymphadenopathy: Has no cervical adenopathy. · Neurological: Alert and oriented. Cranial nerve appears intact, No Gross deficit   · Skin: Skin is warm and dry. No rash noted. · Psychiatric: Has a normal mood, affect and behavior     Labs:  Reviewed.    No results for input(s): NA, K, CL, CO2, PHOS, BUN, CREATININE in the last 72 hours. Invalid input(s): CA,  TSH  No results for input(s): WBC, HGB, HCT, MCV, PLT in the last 72 hours. No results found for: CKTOTAL, CKMB, CKMBINDEX, TROPONINI  No results found for: BNP  Lab Results   Component Value Date    PROTIME 14.6 01/31/2020    INR 1.26 01/31/2020     Lab Results   Component Value Date    CHOL 199 12/20/2019    HDL 48 12/20/2019    TRIG 126 12/20/2019       ECG: Personally reviewed: NSR, HR 74, , QRS 88, QTc 414    Problem List:   Patient Active Problem List    Diagnosis Date Noted    Paroxysmal atrial fibrillation ([facility] [address] 01/17/2020    Essential hypertension 01/17/2020        Assessment:   1. Paroxysmal atrial fibrillation (HCC)    2. Essential hypertension        Cardiac HX: Lor Rangel is a 52 y.o. man with a h/o HTN who is here with an incidental finding of AF at a f/u PCP visit. [id number] 1. TSH 1.96 (10/18/19). pAF  - In NSR  - Echo - LA 3.8/56.8  - On Xarelto 20 mg QD -been out for 2 weeks-we will refill  - Will schedule RFA for AF after 4 weeks of interrupted [address]  - Consider sleep study  - ECG ordered and results personally reviewed     HTN  - On the high side today  -Lifestyle modification- low-sodium diet, exercise   -He is to monitor his blood pressure over the next couple weeks and send them in via [employer]  -We will consider increasing amlodipine if his blood pressure remains elevated. No known systolic HF  No known CAD  Anticoagulation for AF   No Tobacco use. All questions and concerns were addressed to the patient/family. Alternatives to my treatment were discussed. The note was completed using EMR. Every effort was made to ensure accuracy; however, inadvertent computerized transcription errors may be present. Patient received education regarding their diagnosis, treatment and medications while in the office today.       [facility] [address]

## 2020-09-03 ENCOUNTER — OFFICE VISIT (OUTPATIENT)
Dept: CARDIOLOGY CLINIC | Age: 50
End: 2020-09-03
Payer: COMMERCIAL

## 2020-09-03 VITALS
SYSTOLIC BLOOD PRESSURE: 118 MMHG | BODY MASS INDEX: 30.19 KG/M2 | DIASTOLIC BLOOD PRESSURE: 86 MMHG | WEIGHT: 254.6 LBS | HEART RATE: 74 BPM

## 2020-09-03 PROCEDURE — 93000 ELECTROCARDIOGRAM COMPLETE: CPT | Performed by: NURSE PRACTITIONER

## 2020-09-03 PROCEDURE — 99214 OFFICE O/P EST MOD 30 MIN: CPT | Performed by: NURSE PRACTITIONER

## 2020-09-03 NOTE — LETTER
[address]  EP Procedure Sheet  9/3/20    [facility]  1970    Physician: Dr. Selina Anglin    EP Procedures   Pacemaker implant x EP Study    ICD implant  Atrial flutter ablation     Biv implant x Atrial fibrillation ablation    Generator Change  SVT ablation    Lead revision  VT ablation    Lead extraction +/- upgrade  AVN ablation    Loop implant  Cardioversion     Other:   MATI     Equipment   [employer]tr[employer]   ROSALIND Mapping System    [place] [address] [address]  CryoAblation    Biotronik  Laser Lead Extraction    Special Equipment       EP Procedures Scheduling Request  Time Requested  0730   Specific Day 11/27/20   Anesthesia xx   CT surgery backup    Location [facility]     Pre-Procedure Labs / Imaging  x PT/INR  Type & cross   x CBC  Units PRBC   x BMP/Mg  Units FFP    Venogram  CXR    Echo x Pulmonary CTA for Pulmonary vein mapping     Patient Instructions

## 2020-09-03 NOTE — PATIENT INSTRUCTIONS
Patient Education        Low Sodium Diet (2,000 Milligram): Care Instructions  Your Care Instructions     Too much sodium causes your body to hold on to extra water. This can raise your blood pressure and force your heart and kidneys to work harder. In very serious cases, this could cause you to be put in the hospital. It might even be life-threatening. By limiting sodium, you will feel better and lower your risk of serious problems. The most common source of sodium is salt. People get most of the salt in their diet from canned, prepared, and packaged foods. Fast food and restaurant meals also are very high in sodium. Your doctor will probably limit your sodium to less than 2,000 milligrams (mg) a day. This limit counts all the sodium in prepared and packaged foods and any salt you add to your food. Follow-up care is a key part of your treatment and safety. Be sure to make and go to all appointments, and call your doctor if you are having problems. It's also a good idea to know your test results and keep a list of the medicines you take. How can you care for yourself at home? Read food labels  · Read labels on cans and food packages. The labels tell you how much sodium is in each serving. Make sure that you look at the serving size. If you eat more than the serving size, you have eaten more sodium. · Food labels also tell you the Percent Daily Value for sodium. Choose products with low Percent Daily Values for sodium. · Be aware that sodium can come in forms other than salt, including monosodium glutamate (MSG), sodium citrate, and sodium bicarbonate (baking soda). MSG is often added to Asian food. When you eat out, you can sometimes ask for food without MSG or added salt. Buy low-sodium foods  · Buy foods that are labeled \"unsalted\" (no salt added), \"sodium-free\" (less than 5 mg of sodium per serving), or \"low-sodium\" (less than 140 mg of sodium per serving).  Foods labeled \"reduced-sodium\" and \"light sodium\" may still have too much sodium. Be sure to read the label to see how much sodium you are getting. · Buy fresh vegetables, or frozen vegetables without added sauces. Buy low-sodium versions of canned vegetables, soups, and other canned goods. Prepare low-sodium meals  · Cut back on the amount of salt you use in cooking. This will help you adjust to the taste. Do not add salt after cooking. One teaspoon of salt has about 2,300 mg of sodium. · Take the salt shaker off the table. · Flavor your food with garlic, lemon juice, onion, vinegar, herbs, and spices. Do not use soy sauce, lite soy sauce, steak sauce, onion salt, garlic salt, celery salt, mustard, or ketchup on your food. · Use low-sodium salad dressings, sauces, and ketchup. Or make your own salad dressings and sauces without adding salt. · Use less salt (or none) when recipes call for it. You can often use half the salt a recipe calls for without losing flavor. Other foods such as rice, pasta, and grains do not need added salt. · Rinse canned vegetables, and cook them in fresh water. This removes some--but not all--of the salt. · Avoid water that is naturally high in sodium or that has been treated with water softeners, which add sodium. Call your local water company to find out the sodium content of your water supply. If you buy bottled water, read the label and choose a sodium-free brand. Avoid high-sodium foods  · Avoid eating:  ? Smoked, cured, salted, and canned meat, fish, and poultry. ? Ham, sanchez, hot dogs, and luncheon meats. ? Regular, hard, and processed cheese and regular peanut butter. ? Crackers with salted tops, and other salted snack foods such as pretzels, chips, and salted popcorn. ? Frozen prepared meals, unless labeled low-sodium. ? Canned and dried soups, broths, and bouillon, unless labeled sodium-free or low-sodium. ? Canned vegetables, unless labeled sodium-free or low-sodium. ?  [place] fries, pizza, tacos, and other fast foods. ? Pickles, olives, ketchup, and other condiments, especially soy sauce, unless labeled sodium-free or low-sodium. Where can you learn more? Go to https://chpevictorinaeweb.NXT-[email or web address] org and sign in to your ReVera account. Enter M725 in the [facility] box to learn more about \"Low Sodium Diet (2,000 Milligram): Care Instructions. \"     If you do not have an account, please click on the \"Sign Up Now\" link. Current as of: August 22, 2019               Content Version: 12.5  © 6376-5982 [employer]. Care instructions adapted under license by [facility] ([facility]). If you have questions about a medical condition or this instruction, always ask your healthcare professional. [address] any warranty or liability for your use of this information. Patient Education        Learning About Low-Sodium Foods  What foods are low in sodium? The foods you eat contain nutrients, such as vitamins and minerals. Sodium is a nutrient. Your body needs the right amount to stay healthy and work as it should. You can use the list below to help you make choices about which foods to eat.   Fruits  · Fresh, frozen, canned, or dried fruit  Vegetables  · Fresh or frozen vegetables, with no added salt  · Canned vegetables, low-sodium or with no added salt  Grains  · Bagels without salted tops  · Cereal with no added salt  · Corn tortillas  · Crackers with no added salt  · Oatmeal, cooked without salt  · Popcorn with no salt  · Pasta and noodles, cooked without salt  · Rice, cooked without salt  · Unsalted pretzels  Dairy and dairy alternatives  · Butter, unsalted  · Cream cheese  · Ice cream  · Milk  · Soy milk  Meats and other protein foods  · Beans and peas, canned with no salt  · Eggs  · Fresh fish (not smoked)  · Fresh meats (not smoked or cured)  · Nuts and nut butter, prepared without salt  · Poultry, not packaged with sodium solution  · Tofu, unseasoned  · [place], canned without salt  Seasonings  · Garlic  · Herbs and spices  · Lemon juice  · Mustard  · Olive oil  · Salt-free seasoning mixes  · Vinegar  Work with your doctor to find out how much of this nutrient you need. Depending on your health, you may need more or less of it in your diet. Where can you learn more? Go to https://chpepiceweb.[email or web address] org and sign in to your rubberit account. Enter U236 in the [facility] box to learn more about \"Learning About Low-Sodium Foods. \"     If you do not have an account, please click on the \"Sign Up Now\" link. Current as of: August 22, 2019               Content Version: 12.5  © 2006-2020 Healthwise, [employer]. Care instructions adapted under license by [facility] ([facility]). If you have questions about a medical condition or this instruction, always ask your healthcare professional. [place] [address] any warranty or liability for your use of this information. Patient Education        How to Read a Food Label to Limit Sodium: Care Instructions  Your Care Instructions  Sodium causes your body to hold on to extra water. This can raise your blood pressure and force your heart and kidneys to work harder. In very serious cases, this could cause you to be put in the hospital. It might even be life-threatening. By limiting sodium, you will feel better and lower your risk of serious problems. Processed foods, fast food, and restaurant foods are the major sources of dietary sodium. The most common name for sodium is salt. Try to limit how much sodium you eat to less than 2,300 milligrams (mg) a day. If you limit your sodium to 1,500 mg a day, you can lower your blood pressure even more. This limit counts all the salt that you eat in foods you cook or in packaged foods. Keep a list of everything you eat and drink. Follow-up care is a key part of your treatment and safety.  Be sure to make and go to all appointments, and call your doctor if you are having problems. It's also a good idea to know your test results and keep a list of the medicines you take. How can you care for yourself at home? Read ingredient lists on food labels  · Read the list of ingredients on food labels to help you find how much sodium is in a food. The label lists the ingredients in a food in descending order (from the most to the least). If salt or sodium is high on the list, there may be a lot of sodium in the food. · Know that sodium has different names. Sodium is also called monosodium glutamate (MSG, common in [facility] food), sodium citrate, sodium alginate, sodium hydroxide, and sodium phosphate. Read Nutrition Facts labels  · On most foods, there is a Nutrition Facts label. This will tell you how much sodium is in one serving of food. Look at both the serving size and the sodium amount. The serving size is located at the top of the label, usually right under the \"Nutrition Facts\" title. The amount of sodium is given in the list under the title. It is given in milligrams (mg). ? Check the serving size carefully. A single serving is often very small, and you may eat more than one serving. If this is the case, you will eat more sodium than listed on the label. For example, if the serving size for a canned soup is 1 cup and the sodium amount is 470 mg, if you have 2 cups you will eat 940 mg of sodium. · The nutrition facts for fresh fruits and vegetables are not listed on the food. They may be listed somewhere in the store. These foods usually have no sodium or low sodium. · The Nutrition Facts label also gives you the Percent Daily Value for sodium. This is how much of the recommended amount of sodium a serving contains. The daily value for sodium is less than 2,300 mg. So if the Percent Daily Value says 50%, this means one serving is giving you half of this, or 1,150 mg. Buy low-sodium foods  · Look for foods that are made with less sodium.  Watch for the following words on (including insulin). If you take Lantus insulin, take HALF of your usual dose the night before. · Do NOT stop taking Xarelto (any blood thinners). However, do not take blood thinner the morning of the procedure. · Do not apply any lotion, powder, or deodorant the morning of the procedure. · Please bring a list of your medications to the hospital with you. · You must have someone available to drive you home the same day. It is recommended that you do not drive for 24 hours after the procedure. · Please get tested for COVID-19 on 11/23 at [facility] [employer] drive up flu clinic. They are open 8 am - 3 pm.     · CT scan of the chest is due 3 days before the procedure. Please call [phone] to schedule on 11/23. This may be done the same day as the COVID testing. · Lab work is due 3 days before the procedure. You may do this the same day as the CT scan. · If you are unable to make this appointment, please call [facility] at [phone].

## 2020-09-04 ENCOUNTER — PREP FOR PROCEDURE (OUTPATIENT)
Dept: CARDIOLOGY CLINIC | Age: 50
End: 2020-09-04

## 2020-09-04 RX ORDER — SODIUM CHLORIDE 0.9 % (FLUSH) 0.9 %
10 SYRINGE (ML) INJECTION EVERY 12 HOURS SCHEDULED
Status: CANCELLED | OUTPATIENT
Start: 2020-09-04

## 2020-09-04 RX ORDER — SODIUM CHLORIDE 9 MG/ML
INJECTION, SOLUTION INTRAVENOUS CONTINUOUS
Status: CANCELLED | OUTPATIENT
Start: 2020-09-04

## 2020-09-04 RX ORDER — SODIUM CHLORIDE 0.9 % (FLUSH) 0.9 %
10 SYRINGE (ML) INJECTION PRN
Status: CANCELLED | OUTPATIENT
Start: 2020-09-04

## 2020-11-23 ENCOUNTER — HOSPITAL ENCOUNTER (OUTPATIENT)
Dept: CT IMAGING | Age: 50
Discharge: HOME | End: 2020-11-23
Payer: COMMERCIAL

## 2020-11-23 ENCOUNTER — OFFICE VISIT (OUTPATIENT)
Dept: PRIMARY CARE CLINIC | Age: 50
End: 2020-11-23
Payer: COMMERCIAL

## 2020-11-23 PROCEDURE — 99211 OFF/OP EST MAY X REQ PHY/QHP: CPT | Performed by: NURSE PRACTITIONER

## 2020-11-23 PROCEDURE — 6360000004 HC RX CONTRAST MEDICATION: Performed by: NURSE PRACTITIONER

## 2020-11-23 PROCEDURE — 71275 CT ANGIOGRAPHY CHEST: CPT

## 2020-11-23 RX ADMIN — IOPAMIDOL 80 ML: 755 INJECTION, SOLUTION INTRAVENOUS at 09:14

## 2020-11-24 ENCOUNTER — TELEPHONE (OUTPATIENT)
Dept: CARDIOLOGY CLINIC | Age: 50
End: 2020-11-24

## 2020-11-24 ENCOUNTER — HOSPITAL ENCOUNTER (OUTPATIENT)
Dept: CT IMAGING | Age: 50
Discharge: HOME | End: 2020-11-24
Payer: COMMERCIAL

## 2020-11-24 LAB — SARS-COV-2: NOT DETECTED

## 2020-11-24 PROCEDURE — 70470 CT HEAD/BRAIN W/O & W/DYE: CPT

## 2020-11-24 PROCEDURE — 74178 CT ABD&PLV WO CNTR FLWD CNTR: CPT

## 2020-11-24 PROCEDURE — 6360000004 HC RX CONTRAST MEDICATION: Performed by: INTERNAL MEDICINE

## 2020-11-24 RX ADMIN — IOPAMIDOL 75 ML: 755 INJECTION, SOLUTION INTRAVENOUS at 13:40

## 2020-11-24 NOTE — TELEPHONE ENCOUNTER
I called and talked to the patient about his abnormal CT chest results  He was scheduled for CT abdomen and CT head  Personally discussed with Dr. Lizbeth Caba, PCP  We will cancel his atrial fibrillation ablation for this Friday    Okay to stop oral anticoagulation, if needed for bronc/biopsy    Dotty Melara MD   Cardiac Electrophysiology  [address] [facility] [place] [phone]

## 2020-12-03 ENCOUNTER — OFFICE VISIT (OUTPATIENT)
Dept: PULMONOLOGY | Age: 50
End: 2020-12-03
Payer: COMMERCIAL

## 2020-12-03 VITALS
HEART RATE: 80 BPM | WEIGHT: 230 LBS | RESPIRATION RATE: 16 BRPM | TEMPERATURE: 97.3 F | OXYGEN SATURATION: 99 % | BODY MASS INDEX: 27.16 KG/M2 | HEIGHT: 77 IN

## 2020-12-03 PROCEDURE — 99204 OFFICE O/P NEW MOD 45 MIN: CPT | Performed by: INTERNAL MEDICINE

## 2020-12-03 NOTE — PROGRESS NOTES
[facility] Pulmonary and Critical Care    Outpatient Initial Note    Subjective:   Referring Physician: Mona Le / HPI:     The patient is 48 y.o. male who presents today for a new patient visit for lung mass on chest CT. Patient works at [employer] and is a never smoker. He's been having issues with atrial fibrillation and is on xarelto. He was getting worked up to get an ablation and had a cardiac CT scan. On that scan it revealed he had a left upper lobe mass with adenopathy. Other than an occasional cough, he had no other symptoms. Past Medical History:    No past medical history on file. Social History:    Social History     Tobacco Use   Smoking Status Never Smoker   Smokeless Tobacco Never Used       Family History:  No family history on file. Current Medications:  Current Outpatient Medications on File Prior to Visit   Medication Sig Dispense Refill    rivaroxaban (XARELTO) 20 MG TABS tablet Take 1 tablet by mouth daily (with breakfast) 90 tablet 3    losartan (COZAAR) 100 MG tablet Take 100 mg by mouth daily      chlorthalidone (HYGROTON) 25 MG tablet TAKE 1 TABLET ON MONDAY. .. WEDNESDAY AND FRIDAY  3    amLODIPine (NORVASC) 5 MG tablet Take 1 tablet by mouth daily 30 tablet 3    Dextromethorphan-guaiFENesin (CORICIDIN HBP CONGESTION/COUGH)  MG CAPS Take 1 capsule by mouth every 6 hours as needed (Congestion, cough) 20 capsule 0     No current facility-administered medications on file prior to visit.         Allergies:  No Known Allergies    REVIEW OF SYSTEMS:    CONSTITUTIONAL: Negative for fevers and chills  HEENT: Negative for oropharyngeal exudate, post nasal drip, sinus pain / pressure, nasal congestion, ear pain  RESPIRATORY:  See HPI  CARDIOVASCULAR: Negative for chest pain, palpitations, edema  GASTROINTESTINAL: Negative for nausea, vomiting, diarrhea, constipation and abdominal pain  HEMATOLOGICAL: Negative for adenopathy  SKIN: Negative for clubbing, cyanosis, skin lesions  EXTREMITIES: Negative for weakness, decreased ROM  NEUROLOGICAL: Negative for unilateral weakness, speech or gait abnormalities  PSYCH: Negative for anxiety, depression    Objective:   PHYSICAL EXAM:        VITALS:  Pulse 80   Temp 97.3 °F (36.3 °C)   Resp 16   Ht 6' 5\" (1.956 m)   Wt 230 lb (104.3 kg)   SpO2 99%   BMI 27.27 kg/m²     CONSTITUTIONAL:  Awake, alert, cooperative, no apparent distress, and appears stated age  HEENT: No oropharyngeal exudate, PERRL, no cervical adenopathy, no tracheal deviation, thyroid size normal  LUNGS:  No increased work of breathing and clear to auscultation, no crackles or wheezing   CARDIOVASCULAR:  normal S1 and S2 and no JVD  ABDOMEN:  Normal bowel sounds, non-distended and non-tender to palpation  EXT: No edema, no calf tenderness. Pulses are present bilaterally. NEUROLOGIC:  Mental Status Exam:  Level of Alertness:   awake  Orientation:   person, place, time. SKIN:  normal skin color, texture, turgor, no redness, warmth, or swelling     DATA:      Radiology Review:  Pertinent images / reports were reviewed as a part of this visit. Ct chest reveals the following:    Impression    1. Volume rendered and 3-D reconstructions of the pulmonary venous anatomy. 2. No atrial or venous thrombus. .    3. Large left upper lobe mass with mediastinal invasion encasing proximal branches of the left upper lobe pulmonary artery and metastatic hilar and mediastinal lymphadenopathy. Last PFTs: none on file    Immunizations:   Immunization History   Administered Date(s) Administered    Influenza, Quadv, IM, PF (6 mo and older Fluzone, Flulaval, Fluarix, and 3 yrs and older Afluria) 09/27/2019, 11/16/2020       Assessment: This is a 48 y.o. male with lung mass    Plan:   -Patient is a never smoker, but mass with possible mediastinal invasion and adenopathy is most consistent with malignancy.   Only previous imaging was chest xrays, which were interpreted as normal at the time. They may not have been in retrospect. I explained to patient and his wife that we need to approach this as a malignancy until proven otherwise. Ordered a PET scan to look for metastatic disease and am making arrangements for an EBUS of the left hilar lymph node. Planning for possible biopsy of the main mass as well. If PET scan is done prior to the bronch and shows possible disease outside the chest then I may change the biopsy approach. I don't anticipate that will be the case and will move to get a tissue sample as quickly as possible. Diagnosis Orders   1. Mass of upper lobe of left lung  PET CT SKULL BASE TO MID THIGH      - Tobacco use: The patient is not a smoker    More than half the time of this 47 minute visit was spent counseling the patient.    - RTC 3 weeks w/ MD. Call or RTC sooner if symptoms persist or worsen acutely.

## 2020-12-04 ENCOUNTER — TELEPHONE (OUTPATIENT)
Dept: PULMONOLOGY | Age: 50
End: 2020-12-04

## 2020-12-04 NOTE — TELEPHONE ENCOUNTER
Patient called back and PET Scan information was given to him. He was advised that we are still waiting for Surgery Scheduling to call us back with Bronch date.

## 2020-12-07 ENCOUNTER — TELEPHONE (OUTPATIENT)
Dept: CARDIOLOGY CLINIC | Age: 50
End: 2020-12-07

## 2020-12-07 NOTE — TELEPHONE ENCOUNTER
Pt stopped by the office today. He stated he will be having a tooth extracted tomorrow and wants to know if he should stop taking his blood thinner prior. He is currently taking Xarelto 20 mg daily.  Please advise

## 2020-12-07 NOTE — TELEPHONE ENCOUNTER
Called and advised patient that he does not need to come off the blood thinner for tooth extraction. He may bleed a little bit longer than normal however it should not be much of an issue.

## 2020-12-11 ENCOUNTER — HOSPITAL ENCOUNTER (OUTPATIENT)
Dept: PET IMAGING | Age: 50
Discharge: HOME | End: 2020-12-11
Payer: COMMERCIAL

## 2020-12-11 ENCOUNTER — OFFICE VISIT (OUTPATIENT)
Dept: PRIMARY CARE CLINIC | Age: 50
End: 2020-12-11
Payer: COMMERCIAL

## 2020-12-11 VITALS — HEIGHT: 77 IN | BODY MASS INDEX: 27.16 KG/M2 | WEIGHT: 230 LBS

## 2020-12-11 PROCEDURE — A9552 F18 FDG: HCPCS | Performed by: INTERNAL MEDICINE

## 2020-12-11 PROCEDURE — 78815 PET IMAGE W/CT SKULL-THIGH: CPT

## 2020-12-11 PROCEDURE — 3430000000 HC RX DIAGNOSTIC RADIOPHARMACEUTICAL: Performed by: INTERNAL MEDICINE

## 2020-12-11 PROCEDURE — 99211 OFF/OP EST MAY X REQ PHY/QHP: CPT | Performed by: NURSE PRACTITIONER

## 2020-12-11 RX ORDER — FLUDEOXYGLUCOSE F 18 200 MCI/ML
12.61 INJECTION, SOLUTION INTRAVENOUS
Status: COMPLETED | OUTPATIENT
Start: 2020-12-11 | End: 2020-12-11

## 2020-12-11 RX ADMIN — FLUDEOXYGLUCOSE F 18 12.61 MILLICURIE: 200 INJECTION, SOLUTION INTRAVENOUS at 09:01

## 2020-12-12 LAB — SARS-COV-2, NAA: NOT DETECTED

## 2020-12-14 ENCOUNTER — TELEPHONE (OUTPATIENT)
Dept: PULMONOLOGY | Age: 50
End: 2020-12-14

## 2020-12-14 NOTE — RESULT ENCOUNTER NOTE
Spoke to Bhargavi and his wife. PET scan is consistent with advanced age cancer, however he has a couple small mets in the ribs which are likely too small to provide enough information, and an adrenal lesion that would be too difficult for interventional radiology to access. We will plan to move forward with the EBUS that is already scheduled for December 17.

## 2020-12-16 ENCOUNTER — ANESTHESIA EVENT (OUTPATIENT)
Dept: ENDOSCOPY | Age: 50
End: 2020-12-16
Payer: COMMERCIAL

## 2020-12-17 ENCOUNTER — HOSPITAL ENCOUNTER (OUTPATIENT)
Age: 50
Setting detail: OUTPATIENT SURGERY
Discharge: HOME | End: 2020-12-17
Attending: INTERNAL MEDICINE | Admitting: INTERNAL MEDICINE
Payer: COMMERCIAL

## 2020-12-17 ENCOUNTER — ANESTHESIA (OUTPATIENT)
Dept: ENDOSCOPY | Age: 50
End: 2020-12-17
Payer: COMMERCIAL

## 2020-12-17 ENCOUNTER — APPOINTMENT (OUTPATIENT)
Dept: CT IMAGING | Age: 50
End: 2020-12-17
Attending: INTERNAL MEDICINE
Payer: COMMERCIAL

## 2020-12-17 VITALS
DIASTOLIC BLOOD PRESSURE: 86 MMHG | WEIGHT: 240 LBS | TEMPERATURE: 97.1 F | RESPIRATION RATE: 20 BRPM | BODY MASS INDEX: 28.34 KG/M2 | OXYGEN SATURATION: 99 % | HEIGHT: 77 IN | SYSTOLIC BLOOD PRESSURE: 124 MMHG | HEART RATE: 78 BPM

## 2020-12-17 VITALS
RESPIRATION RATE: 25 BRPM | OXYGEN SATURATION: 100 % | SYSTOLIC BLOOD PRESSURE: 174 MMHG | DIASTOLIC BLOOD PRESSURE: 87 MMHG

## 2020-12-17 LAB — SARS-COV-2, NAAT: NOT DETECTED

## 2020-12-17 PROCEDURE — 6360000002 HC RX W HCPCS: Performed by: INTERNAL MEDICINE

## 2020-12-17 PROCEDURE — 6360000002 HC RX W HCPCS: Performed by: NURSE ANESTHETIST, CERTIFIED REGISTERED

## 2020-12-17 PROCEDURE — 3609020000 HC BRONCHOSCOPY W/EBUS FNA: Performed by: INTERNAL MEDICINE

## 2020-12-17 PROCEDURE — 7100000011 HC PHASE II RECOVERY - ADDTL 15 MIN: Performed by: INTERNAL MEDICINE

## 2020-12-17 PROCEDURE — 3609011800 HC BRONCHOSCOPY/TRANSBRONCHIAL LUNG BIOPSY: Performed by: INTERNAL MEDICINE

## 2020-12-17 PROCEDURE — 88172 CYTP DX EVAL FNA 1ST EA SITE: CPT

## 2020-12-17 PROCEDURE — 70450 CT HEAD/BRAIN W/O DYE: CPT

## 2020-12-17 PROCEDURE — 31625 BRONCHOSCOPY W/BIOPSY(S): CPT | Performed by: INTERNAL MEDICINE

## 2020-12-17 PROCEDURE — 2720000010 HC SURG SUPPLY STERILE: Performed by: INTERNAL MEDICINE

## 2020-12-17 PROCEDURE — U0002 COVID-19 LAB TEST NON-CDC: HCPCS

## 2020-12-17 PROCEDURE — 3700000000 HC ANESTHESIA ATTENDED CARE: Performed by: INTERNAL MEDICINE

## 2020-12-17 PROCEDURE — 7100000010 HC PHASE II RECOVERY - FIRST 15 MIN: Performed by: INTERNAL MEDICINE

## 2020-12-17 PROCEDURE — 7100000000 HC PACU RECOVERY - FIRST 15 MIN: Performed by: INTERNAL MEDICINE

## 2020-12-17 PROCEDURE — 2709999900 HC NON-CHARGEABLE SUPPLY: Performed by: INTERNAL MEDICINE

## 2020-12-17 PROCEDURE — 2580000003 HC RX 258: Performed by: ANESTHESIOLOGY

## 2020-12-17 PROCEDURE — 7100000001 HC PACU RECOVERY - ADDTL 15 MIN: Performed by: INTERNAL MEDICINE

## 2020-12-17 PROCEDURE — 3700000001 HC ADD 15 MINUTES (ANESTHESIA): Performed by: INTERNAL MEDICINE

## 2020-12-17 PROCEDURE — 6360000002 HC RX W HCPCS: Performed by: ANESTHESIOLOGY

## 2020-12-17 PROCEDURE — 31652 BRONCH EBUS SAMPLNG 1/2 NODE: CPT | Performed by: INTERNAL MEDICINE

## 2020-12-17 PROCEDURE — 88342 IMHCHEM/IMCYTCHM 1ST ANTB: CPT

## 2020-12-17 PROCEDURE — 2500000003 HC RX 250 WO HCPCS: Performed by: NURSE ANESTHETIST, CERTIFIED REGISTERED

## 2020-12-17 PROCEDURE — 88173 CYTOPATH EVAL FNA REPORT: CPT

## 2020-12-17 PROCEDURE — 31654 BRONCH EBUS IVNTJ PERPH LES: CPT | Performed by: INTERNAL MEDICINE

## 2020-12-17 PROCEDURE — 31629 BRONCHOSCOPY/NEEDLE BX EACH: CPT | Performed by: INTERNAL MEDICINE

## 2020-12-17 PROCEDURE — 88341 IMHCHEM/IMCYTCHM EA ADD ANTB: CPT

## 2020-12-17 PROCEDURE — 88305 TISSUE EXAM BY PATHOLOGIST: CPT

## 2020-12-17 RX ORDER — SODIUM CHLORIDE 0.9 % (FLUSH) 0.9 %
10 SYRINGE (ML) INJECTION PRN
Status: DISCONTINUED | OUTPATIENT
Start: 2020-12-17 | End: 2020-12-17 | Stop reason: HOSPADM

## 2020-12-17 RX ORDER — EPINEPHRINE 0.1 MG/ML
SYRINGE (ML) INJECTION PRN
Status: DISCONTINUED | OUTPATIENT
Start: 2020-12-17 | End: 2020-12-17 | Stop reason: ALTCHOICE

## 2020-12-17 RX ORDER — LIDOCAINE HYDROCHLORIDE 20 MG/ML
INJECTION, SOLUTION EPIDURAL; INFILTRATION; INTRACAUDAL; PERINEURAL PRN
Status: DISCONTINUED | OUTPATIENT
Start: 2020-12-17 | End: 2020-12-17 | Stop reason: SDUPTHER

## 2020-12-17 RX ORDER — SODIUM CHLORIDE, SODIUM LACTATE, POTASSIUM CHLORIDE, CALCIUM CHLORIDE 600; 310; 30; 20 MG/100ML; MG/100ML; MG/100ML; MG/100ML
INJECTION, SOLUTION INTRAVENOUS CONTINUOUS
Status: DISCONTINUED | OUTPATIENT
Start: 2020-12-17 | End: 2020-12-17 | Stop reason: HOSPADM

## 2020-12-17 RX ORDER — ONDANSETRON 2 MG/ML
4 INJECTION INTRAMUSCULAR; INTRAVENOUS
Status: DISCONTINUED | OUTPATIENT
Start: 2020-12-17 | End: 2020-12-17 | Stop reason: HOSPADM

## 2020-12-17 RX ORDER — ONDANSETRON 2 MG/ML
INJECTION INTRAMUSCULAR; INTRAVENOUS PRN
Status: DISCONTINUED | OUTPATIENT
Start: 2020-12-17 | End: 2020-12-17 | Stop reason: SDUPTHER

## 2020-12-17 RX ORDER — SODIUM CHLORIDE 9 MG/ML
INJECTION, SOLUTION INTRAVENOUS CONTINUOUS
Status: DISCONTINUED | OUTPATIENT
Start: 2020-12-17 | End: 2020-12-17 | Stop reason: HOSPADM

## 2020-12-17 RX ORDER — PROPOFOL 10 MG/ML
INJECTION, EMULSION INTRAVENOUS PRN
Status: DISCONTINUED | OUTPATIENT
Start: 2020-12-17 | End: 2020-12-17 | Stop reason: SDUPTHER

## 2020-12-17 RX ORDER — SODIUM CHLORIDE 0.9 % (FLUSH) 0.9 %
10 SYRINGE (ML) INJECTION EVERY 12 HOURS SCHEDULED
Status: DISCONTINUED | OUTPATIENT
Start: 2020-12-17 | End: 2020-12-17 | Stop reason: HOSPADM

## 2020-12-17 RX ORDER — FENTANYL CITRATE 50 UG/ML
25 INJECTION, SOLUTION INTRAMUSCULAR; INTRAVENOUS EVERY 5 MIN PRN
Status: DISCONTINUED | OUTPATIENT
Start: 2020-12-17 | End: 2020-12-17 | Stop reason: HOSPADM

## 2020-12-17 RX ORDER — ROCURONIUM BROMIDE 10 MG/ML
INJECTION, SOLUTION INTRAVENOUS PRN
Status: DISCONTINUED | OUTPATIENT
Start: 2020-12-17 | End: 2020-12-17 | Stop reason: SDUPTHER

## 2020-12-17 RX ORDER — DEXAMETHASONE SODIUM PHOSPHATE 4 MG/ML
INJECTION, SOLUTION INTRA-ARTICULAR; INTRALESIONAL; INTRAMUSCULAR; INTRAVENOUS; SOFT TISSUE PRN
Status: DISCONTINUED | OUTPATIENT
Start: 2020-12-17 | End: 2020-12-17 | Stop reason: SDUPTHER

## 2020-12-17 RX ADMIN — ROCURONIUM BROMIDE 10 MG: 10 INJECTION INTRAVENOUS at 14:42

## 2020-12-17 RX ADMIN — PROPOFOL 300 MG: 10 INJECTION, EMULSION INTRAVENOUS at 13:46

## 2020-12-17 RX ADMIN — SODIUM CHLORIDE, POTASSIUM CHLORIDE, SODIUM LACTATE AND CALCIUM CHLORIDE: 600; 310; 30; 20 INJECTION, SOLUTION INTRAVENOUS at 11:44

## 2020-12-17 RX ADMIN — ROCURONIUM BROMIDE 60 MG: 10 INJECTION INTRAVENOUS at 13:46

## 2020-12-17 RX ADMIN — SODIUM CHLORIDE, POTASSIUM CHLORIDE, SODIUM LACTATE AND CALCIUM CHLORIDE: 600; 310; 30; 20 INJECTION, SOLUTION INTRAVENOUS at 14:19

## 2020-12-17 RX ADMIN — SUGAMMADEX 200 MG: 100 INJECTION, SOLUTION INTRAVENOUS at 15:24

## 2020-12-17 RX ADMIN — ROCURONIUM BROMIDE 10 MG: 10 INJECTION INTRAVENOUS at 14:07

## 2020-12-17 RX ADMIN — DEXAMETHASONE SODIUM PHOSPHATE 8 MG: 4 INJECTION, SOLUTION INTRAMUSCULAR; INTRAVENOUS at 13:58

## 2020-12-17 RX ADMIN — FENTANYL CITRATE 100 MCG: 50 INJECTION INTRAMUSCULAR; INTRAVENOUS at 13:33

## 2020-12-17 RX ADMIN — LIDOCAINE HYDROCHLORIDE 100 MG: 20 INJECTION, SOLUTION EPIDURAL; INFILTRATION; INTRACAUDAL; PERINEURAL at 13:46

## 2020-12-17 RX ADMIN — ONDANSETRON 4 MG: 2 INJECTION INTRAMUSCULAR; INTRAVENOUS at 13:58

## 2020-12-17 ASSESSMENT — PULMONARY FUNCTION TESTS
PIF_VALUE: 1
PIF_VALUE: 35
PIF_VALUE: 29
PIF_VALUE: 35
PIF_VALUE: 35
PIF_VALUE: 31
PIF_VALUE: 35
PIF_VALUE: 35
PIF_VALUE: 1
PIF_VALUE: 1
PIF_VALUE: 19
PIF_VALUE: 35
PIF_VALUE: 35
PIF_VALUE: 2
PIF_VALUE: 20
PIF_VALUE: 35
PIF_VALUE: 16
PIF_VALUE: 35
PIF_VALUE: 4
PIF_VALUE: 24
PIF_VALUE: 35
PIF_VALUE: 25
PIF_VALUE: 19
PIF_VALUE: 27
PIF_VALUE: 35
PIF_VALUE: 17
PIF_VALUE: 25
PIF_VALUE: 24
PIF_VALUE: 20
PIF_VALUE: 17
PIF_VALUE: 19
PIF_VALUE: 1
PIF_VALUE: 8
PIF_VALUE: 20
PIF_VALUE: 42
PIF_VALUE: 35
PIF_VALUE: 19
PIF_VALUE: 35
PIF_VALUE: 2
PIF_VALUE: 4
PIF_VALUE: 35
PIF_VALUE: 4
PIF_VALUE: 19
PIF_VALUE: 4
PIF_VALUE: 1
PIF_VALUE: 1
PIF_VALUE: 21
PIF_VALUE: 26
PIF_VALUE: 5
PIF_VALUE: 35
PIF_VALUE: 24
PIF_VALUE: 35
PIF_VALUE: 35
PIF_VALUE: 1
PIF_VALUE: 36
PIF_VALUE: 35
PIF_VALUE: 27
PIF_VALUE: 35
PIF_VALUE: 33
PIF_VALUE: 35
PIF_VALUE: 19
PIF_VALUE: 31
PIF_VALUE: 4
PIF_VALUE: 1
PIF_VALUE: 26
PIF_VALUE: 35
PIF_VALUE: 35
PIF_VALUE: 19
PIF_VALUE: 35
PIF_VALUE: 32
PIF_VALUE: 35
PIF_VALUE: 35
PIF_VALUE: 0
PIF_VALUE: 35
PIF_VALUE: 26
PIF_VALUE: 35
PIF_VALUE: 1
PIF_VALUE: 35
PIF_VALUE: 20
PIF_VALUE: 18
PIF_VALUE: 35
PIF_VALUE: 35
PIF_VALUE: 34
PIF_VALUE: 18
PIF_VALUE: 1
PIF_VALUE: 35
PIF_VALUE: 1
PIF_VALUE: 31
PIF_VALUE: 35
PIF_VALUE: 35
PIF_VALUE: 17
PIF_VALUE: 35
PIF_VALUE: 19
PIF_VALUE: 17
PIF_VALUE: 35
PIF_VALUE: 19
PIF_VALUE: 0
PIF_VALUE: 19
PIF_VALUE: 35
PIF_VALUE: 20
PIF_VALUE: 1
PIF_VALUE: 35

## 2020-12-17 ASSESSMENT — PAIN - FUNCTIONAL ASSESSMENT: PAIN_FUNCTIONAL_ASSESSMENT: 0-10

## 2020-12-17 NOTE — PROGRESS NOTES
Patient to PACU 9 s/p BRONCHOSCOPY W/EBUS FNA  General   BRONCHOSCOPY/ENDOBRONCHIAL LUNG BIOPSY, report received from CRNA and Endo nurse. patient had just had CT completed. Patient is alert and oriented, denies pain and nausea at this time.

## 2020-12-17 NOTE — PROCEDURES
Diagnosis:  Left upper lobe lung mass    PROCEDURE: Fiberoptic bronchoscopy with endobronchial ultrasound-guided biopsy of lymph nodes    DESCRIPTION OF PROCEDURE: Informed consent was obtained from the patient after explaining the risks and benefits. A time out was taken. Total intravenous anesthesia was kindly provided by the anesthetist.     Initially a standard bronchoscope was used to perform a complete airway inspection. The trachea appeared normal.  The bronchial trees were examined to the subsegmental level. There was an endobronchial lesion identified with in the anterior subsegment of the left upper lobe as well as some extrinsic compression     Next the EBUS scope was passed with ease via the ETT. Direct visualization of the lymph nodes was obtained using endobronchial ultrasound (EBUS) guidance. A complete ultrasound lymph node exam was performed for lymph node stations 2, 4, 7, 10 and 11. The following lymph nodes were subjected to EBUS guided biopsy using standard technique and in the following order:7, 10L. An additional ultrasound guided biopsy was performed of peripheral mass which could be visualized in the left upper lobe bronchus via the lingula. Preliminary input from pathology was of a non-small cell carcinoma    The standard bronchscope was re-inserted passed the vocal cords and into the trachea via the ETT. -  Washings were obtained from throughout the airways. -  Endobronchial forceps biopsies were obtained from left up lobe lesion  - Direct transbronchial needle FNA via a Knight needle was performed of the visible lesion  Patient had moderate to heavy bleeding after the last 3 biopsies which were of the mass itself. So only one of each was performed as topical epinephrine was didn't control the bleeding well enough to justify additional biopsies. EBL: 75 mL    The patient tolerated the procedure well.  Recovery will be per the anesthesia team. Addendum:  Following the procedure, [facility] and fell out of the bed onto the floor striking the left side of his head. He was lethargic from sedation so neuro exam was initially limited. We were able to get him back into the bed by lifting him off the floor. He had swelling over the bridge of his left eye to which we applied an ice pack. He gradually became more cognitively intact and actually gave me a thumbs up that he was ok a few minutes later. Out of an abundance of caution, I contacted CT so they would be ready for a stat scan which I ordered. Fortunately it did not show evidence of any injury and patient was able to discharge home as planned. I spoke with patient's wife Argelia Sher, who recorded the conversation because English isn't her first language. I told her about the fall and the preliminary results of the biopsies such that I would refer him to oncology. FOLLOW UP:  is with me or oncology. in approximately 7-10 days. Patient is instructed to call with concerns and if follow up has not already been scheduled. In the event of severe symptoms or if the patient is unable to reach my office, instructions are given to proceed to the emergency department.      Erin Farrar MD

## 2020-12-17 NOTE — PROGRESS NOTES
Ambulatory Surgery/Procedure Discharge Note    Vitals:    12/17/20 1707   BP: 124/86   Pulse: 78   Resp: 20   Temp: 97.1 °F (36.2 °C)   SpO2: 99%     Patient meets discharge criteria based on Archana score. In: 120 [P.O.:120]  Out: 50     Restroom use offered before discharge. Yes    Pain assessment:  none  Pain Level: 0    Discharge instructions reviewed with patient and wife at the car. Patient denies having any pain or discomfort. Neuro intact. All needs met at this time. Patient discharged to home/self care.  Patient discharged via wheel chair by transporter to waiting family/S.O.       12/17/2020 6:53 PM

## 2020-12-17 NOTE — ANESTHESIA POSTPROCEDURE EVALUATION
Department of Anesthesiology  Postprocedure Note    Patient: Wale Light  MRN: [id number]  YOB: 1970  Date of evaluation: 12/17/2020  Time:  4:32 PM     Procedure Summary     Date: 12/17/20 Room / Location: [address] [place] [address] Kecia Blake 03 / [facility],[address]    Anesthesia Start: 8680 Anesthesia Stop: 1537    Procedures:       BRONCHOSCOPY W/EBUS FNA      BRONCHOSCOPY/ENDOBRONCHIAL LUNG BIOPSY Diagnosis:       Lung mass      (Left Lung mass with adenopathy [R91.8])    Surgeons: Boom Troy MD Responsible Provider: Silvino Goodwin DO    Anesthesia Type: general ASA Status: 3          Anesthesia Type: general    Archana Phase I: Archana Score: 9    Archana Phase II:      Last vitals: Reviewed and per EMR flowsheets. Anesthesia Post Evaluation    Patient location during evaluation: bedside  Patient participation: complete - patient participated  Level of consciousness: awake  Pain score: 0  Airway patency: patent  Nausea & Vomiting: no nausea and no vomiting  Complications: yes  Specific complications: unexpected post-op hospitalization  Cardiovascular status: blood pressure returned to baseline  Respiratory status: acceptable  Hydration status: euvolemic  Comments: Notified - per  Staff patient / fall from stretcher post transport from OR table . VSS / no focal deficits , neuro exam grossly intact   - was not witness to fall . Patient to receive head CT / surgeon / nursing aware will continue to follow .

## 2020-12-17 NOTE — PROGRESS NOTES
PACU Transfer to [facility]  Pt's Current Allergies: Patient has no known allergies. Pt meets criteria to transfer to next phase of care per Mar Bruno and YUE standards    No results for input(s): POCGLU in the last 72 hours. Vitals:    12/17/20 1603   BP: 129/83   Pulse: 89   Resp: 23   Temp: 97.5 °F (36.4 °C)   SpO2: 97%      BP within 20% of pt's admitting BP as per Archana Score      Intake/Output Summary (Last 24 hours) at 12/17/2020 1634  Last data filed at 12/17/2020 1514  Gross per 24 hour   Intake 1000 ml   Output 50 ml   Net 950 ml         Pain assessment:  level of pain (1-10, 10 severe),   Pain Level: 1 tolerable        Patient transferred to care of [facility] RN.   Family updated and directed to [facility]    12/17/2020 4:34 PM

## 2020-12-17 NOTE — ANESTHESIA PRE PROCEDURE
Department of Anesthesiology  Preprocedure Note       Name:  Yehuda Davenport   Age:  48 y.o.  :  1970                                          MRN:  [id number]         Date:  2020      Surgeon: Celeste De La Rosa):  Antonio Valenzuela MD    Procedure: Procedure(s):  BRONCHOSCOPY WITH ENDOBRONCHIAL ULTRASOUND, FLUOROSCOPY FOR TRANSBRONCHIAL BIOPSIES    Medications prior to admission:   Prior to Admission medications    Medication Sig Start Date End Date Taking? Authorizing Provider   rivaroxaban (XARELTO) 20 MG TABS tablet Take 1 tablet by mouth daily (with breakfast) 9/3/20  Yes TITA Dixon - CNP   losartan (COZAAR) 100 MG tablet Take 100 mg by mouth daily   Yes Historical Provider, MD   chlorthalidone (HYGROTON) 25 MG tablet TAKE 1 TABLET ON MONDAY. .. [facility] AND FRIDAY 10/21/19  Yes Historical Provider, MD   amLODIPine (NORVASC) 5 MG tablet Take 1 tablet by mouth daily 18  Yes Marc Kelly MD   Dextromethorphan-guaiFENesin (CORICIDIN HBP CONGESTION/COUGH)  MG CAPS Take 1 capsule by mouth every 6 hours as needed (Congestion, cough) 18   Marc Kelly MD       Current medications:    Current Facility-Administered Medications   Medication Dose Route Frequency Provider Last Rate Last Admin    sodium chloride flush 0.9 % injection 10 mL  10 mL Intravenous 2 times per day Paxton Genera, DO        sodium chloride flush 0.9 % injection 10 mL  10 mL Intravenous PRN Paxton Genera, DO        0.9 % sodium chloride infusion   Intravenous Continuous Paxton Genera, DO        lactated ringers infusion   Intravenous Continuous Paxton Genera, DO           Allergies:  No Known Allergies    Problem List:    Patient Active Problem List   Diagnosis Code    Paroxysmal atrial fibrillation (HCC) I48.0    Essential hypertension I10       Past Medical History:        Diagnosis Date    Hypertension        Past Surgical History:        Procedure Laterality Date    KNEE ARTHROSCOPY         Social History: Social History     Tobacco Use    Smoking status: Never Smoker    Smokeless tobacco: Never Used   Substance Use Topics    Alcohol use: Not Currently     Frequency: Never                                Counseling given: Not Answered      Vital Signs (Current):   Vitals:    12/17/20 1120   BP: (!) 147/90   Pulse: 79   Resp: 17   Temp: 97.6 °F (36.4 °C)   TempSrc: Temporal   SpO2: 100%   Weight: 240 lb (108.9 kg)   Height: 6' 5\" (1.956 m)                                              BP Readings from Last 3 Encounters:   12/17/20 (!) 147/90   09/03/20 118/86   03/03/20 124/88       NPO Status: Time of last liquid consumption: 0240                        Time of last solid consumption: 2200                        Date of last liquid consumption: 12/17/20                        Date of last solid food consumption: 12/16/20    BMI:   Wt Readings from Last 3 Encounters:   12/17/20 240 lb (108.9 kg)   12/11/20 230 lb (104.3 kg)   12/03/20 230 lb (104.3 kg)     Body mass index is 28.46 kg/m². CBC:   Lab Results   Component Value Date    WBC 7.0 10/18/2019    RBC 4.98 10/18/2019    HGB 15.0 10/18/2019    HCT 44.5 10/18/2019    MCV 89.2 10/18/2019    RDW 13.9 10/18/2019     10/18/2019       CMP:   Lab Results   Component Value Date     01/31/2020    K 3.6 01/31/2020     01/31/2020    CO2 23 01/31/2020    BUN 20 01/31/2020    CREATININE 1.2 01/31/2020    GFRAA >60 01/31/2020    AGRATIO 1.6 10/18/2019    LABGLOM >60 01/31/2020    GLUCOSE 105 01/31/2020    PROT 7.4 10/18/2019    CALCIUM 9.4 01/31/2020    BILITOT 1.5 10/18/2019    ALKPHOS 50 10/18/2019    AST 31 10/18/2019    ALT 38 10/18/2019       POC Tests: No results for input(s): POCGLU, POCNA, POCK, POCCL, POCBUN, POCHEMO, POCHCT in the last 72 hours.     Coags:   Lab Results   Component Value Date    PROTIME 14.6 01/31/2020    INR 1.26 01/31/2020       HCG (If Applicable): No results found for: PREGTESTUR, PREGSERUM, HCG, HCGQUANT

## 2020-12-18 ENCOUNTER — TELEPHONE (OUTPATIENT)
Dept: PULMONOLOGY | Age: 50
End: 2020-12-18

## 2020-12-18 NOTE — TELEPHONE ENCOUNTER
Patient and his spouse called asking for a referral to oncology or a recommended oncologist.  States she cannot recall where you suggested them to call when being discharged.  Please advise

## 2020-12-22 ENCOUNTER — VIRTUAL VISIT (OUTPATIENT)
Dept: PULMONOLOGY | Age: 50
End: 2020-12-22
Payer: COMMERCIAL

## 2020-12-22 PROCEDURE — 99442 PR PHYS/QHP TELEPHONE EVALUATION 11-20 MIN: CPT | Performed by: INTERNAL MEDICINE

## 2020-12-22 NOTE — PROGRESS NOTES
Pulmonology Telephone Visit    Pursuant to the emergency declaration under the [facility] waiver authority and the [employer] General Act this Telephone Visit was insisted, with patient's consent, to reduce the patient's risk of exposure to COVID-19 and provide continuity of care for an established patient. The patient was at home, while the provider was at the clinic. Services were provided through a synchronous discussion through a Telephone Call to substitute for in-person clinic visit, and coded as such. Total time spent with patient was 15 minutes. [facility] Pulmonary and Critical Care    Outpatient Note    Subjective:   Referring Physician: Patricia Barton / HPI:     The patient is 48 y.o. male who presents today for a follow-up visit for lung cancer. Bree Rose had his EBUS last week and is calling to go over results. I have spoken to his wife afterwards to indicate that we had obtained diagnostic material but we are awaiting further details. Other than a sore head from when he fell out of the stretcher after the procedure and coughing up blood for a couple of days after he is doing well. Previous visit: Patient works at [employer] and is a never smoker. He's been having issues with atrial fibrillation and is on xarelto. He was getting worked up to get an ablation and had a cardiac CT scan. On that scan it revealed he had a left upper lobe mass with adenopathy. Other than an occasional cough, he had no other symptoms. Past Medical History:    Past Medical History:   Diagnosis Date    Hypertension        Social History:    Social History     Tobacco Use   Smoking Status Never Smoker   Smokeless Tobacco Never Used       Family History:  No family history on file.   Current Medications:  Current Outpatient Medications on File Prior to Visit   Medication Sig Dispense Refill  rivaroxaban (XARELTO) 20 MG TABS tablet Take 1 tablet by mouth daily (with breakfast) 90 tablet 3    losartan (COZAAR) 100 MG tablet Take 100 mg by mouth daily      chlorthalidone (HYGROTON) 25 MG tablet TAKE 1 TABLET ON MONDAY. .. WEDNESDAY AND FRIDAY  3    amLODIPine (NORVASC) 5 MG tablet Take 1 tablet by mouth daily 30 tablet 3    Dextromethorphan-guaiFENesin (CORICIDIN HBP CONGESTION/COUGH)  MG CAPS Take 1 capsule by mouth every 6 hours as needed (Congestion, cough) 20 capsule 0     No current facility-administered medications on file prior to visit. Allergies:  No Known Allergies    REVIEW OF SYSTEMS:    CONSTITUTIONAL: Negative for fevers and chills  HEENT: Negative for oropharyngeal exudate, post nasal drip, sinus pain / pressure, nasal congestion, ear pain  RESPIRATORY:  See HPI  CARDIOVASCULAR: Negative for chest pain, palpitations, edema  GASTROINTESTINAL: Negative for nausea, vomiting, diarrhea, constipation and abdominal pain  HEMATOLOGICAL: Negative for adenopathy  SKIN: Negative for clubbing, cyanosis, skin lesions  EXTREMITIES: Negative for weakness, decreased ROM  NEUROLOGICAL: Negative for unilateral weakness, speech or gait abnormalities  PSYCH: Negative for anxiety, depression    Objective:   PHYSICAL EXAM:        VITALS:  There were no vitals taken for this visit. No exam because this was a telephone visit. DATA:      Radiology Review:  Pertinent images / reports were reviewed as a part of this visit. Ct chest reveals the following:    Impression    1. Volume rendered and 3-D reconstructions of the pulmonary venous anatomy. 2. No atrial or venous thrombus. .    3. Large left upper lobe mass with mediastinal invasion encasing proximal branches of the left upper lobe pulmonary artery and metastatic hilar and mediastinal lymphadenopathy.       Last PFTs: none on file    Immunizations:   Immunization History   Administered Date(s) Administered  Influenza, Quadv, IM, PF (6 mo and older Fluzone, Flulaval, Fluarix, and 3 yrs and older Afluria) 09/27/2019, 11/16/2020       Assessment: This is a 48 y.o. male with stage IV lung cancer, adenocarcinoma    Plan:     Pathology was consistent with lung primary pulmonary adenocarcinoma. My biopsies were positive in his subcarinal and left hilar lymph nodes, as well as in the tumor itself. That alone puts him as a high stage III but his PET scan indicates that he has metastatic disease at his adrenal gland and 2 ribs. Referral to oncology has already been placed and he is due to see them in a week. Hopefully we will have enough tissue from the biopsies I performed to get genetics for possible  mutations as patient is a never smoker. Patient had a significant bleeding with his biopsies, so I warned him that if he starts coughing up blood this far out from the procedure that I want to know about it. I would like to follow-up with him in about 3 months otherwise to see how he is doing with his treatment and ensure that he is not having any reactions to the treatments itself. - Tobacco use: The patient is not a smoker    More than half the time of this 15 minute visit was spent counseling the patient.    - RTC 3 months w/ MD. Call or RTC sooner if symptoms persist or worsen acutely.

## 2020-12-31 ENCOUNTER — OFFICE VISIT (OUTPATIENT)
Dept: PRIMARY CARE CLINIC | Age: 50
End: 2020-12-31
Payer: COMMERCIAL

## 2020-12-31 PROCEDURE — 99211 OFF/OP EST MAY X REQ PHY/QHP: CPT | Performed by: NURSE PRACTITIONER

## 2021-01-02 LAB — SARS-COV-2, NAA: NOT DETECTED

## 2021-01-25 ENCOUNTER — HOSPITAL ENCOUNTER (OUTPATIENT)
Age: 51
Discharge: HOME | End: 2021-01-25
Payer: COMMERCIAL

## 2021-01-25 ENCOUNTER — HOSPITAL ENCOUNTER (OUTPATIENT)
Dept: GENERAL RADIOLOGY | Age: 51
Discharge: HOME | End: 2021-01-25
Payer: COMMERCIAL

## 2021-01-25 DIAGNOSIS — R52 PAIN: ICD-10-CM

## 2021-01-25 PROCEDURE — 71100 X-RAY EXAM RIBS UNI 2 VIEWS: CPT

## 2021-01-28 NOTE — PROGRESS NOTES
[address]   Cardiac Electrophysiology Consultation   Date: 2/11/2021  Reason for Consultation:  Atrial Fibrillation  Consult Requesting Physician: No att. providers found     Chief Complaint:   Chief Complaint   Patient presents with    Atrial Fibrillation      HPI: Abigail Herron is a 46 y.o. man referred by Dr. Miguel Colunga for AF. PMH of HTN. On 10/18/19, pt was at Dr. Lopez Officer office with 3 day h/o fever/chills, SOB, and dry cough, treated for an URI.  ECG at that OV found AF, rate controlled. He denied CP, palpitations, heart racing, dizziness, lightheadedness, or change in energy. ECG in f/u on 10/31/19 also showed rate controlled AF, started on Xarelto.  Echo (11/2019) with EF of 55% and AKIRA. S/p DCCV on 1/31/20 with improvement in energy when in SR. He was scheduled for an AF ablation in 11/2020, but pre-op cardiac CT found a YUMIKO mass, later diagnosed as primary adenocarcinoma with mets to his adrenal gland and 2 ribs. Interval History: Today, he is here to f/u, presenting in [employer]. He is currently undergoing radiation therapy for the lung CA. Denies complaints of palpitations, dizziness, CP, SOB, orthopnea, presyncope, or syncope. 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 He continues to be to be active. He plays sports without any functional limitations. Atrial Fibrillation:    BMI    :   Body mass index is 30.12 kg/m².     Duration   :   10/1819    Symptoms   :   Decline in his functional capacity    Previous DCCV :  1/31/20    Previous AAD           :   none    Beta blocker  :   none    ACE / ARB  :   losartan    OAC   :   Xarelto    LVEF    :   55%    Left atrial size :   4.0 cm    AC   :   Not tested    Past Medical History:   Diagnosis Date    Hypertension         Past Surgical History:   Procedure Laterality Date    BRONCHOSCOPY  12/17/2020    BRONCHOSCOPY W/EBUS FNA performed by Modesto Alcantar MD at [address]  12/17/2020 BRONCHOSCOPY/ENDOBRONCHIAL LUNG BIOPSY performed [facility] L[facility] [address] [facility]         Allergies: Allergies   Allergen Reactions    Eggs Or Egg-Derived Products        Medication:   Prior to Admission medications    Medication Sig Start Date End Date Taking? Authorizing Provider   rivaroxaban (XARELTO) 20 MG TABS tablet Take 1 tablet by mouth daily (with breakfast) 9/3/20  Yes Thomas Murray, APRN - CNP   losartan (COZAAR) 100 MG tablet Take 100 mg by mouth daily   Yes Historical Provider, MD   chlorthalidone (HYGROTON) 25 MG tablet TAKE 1 TABLET ON MONDAY. .. [facility] AND FRIDAY 10/21/19  Yes Historical Provider, MD   amLODIPine (NORVASC) 5 MG tablet Take 1 tablet by mouth daily 8/8/18  Yes Sherice Guajardo MD   Dextromethorphan-guaiFENesin (CORICIDIN HBP CONGESTION/COUGH)  MG CAPS Take 1 capsule by mouth every 6 hours as needed (Congestion, cough) 8/8/18  Yes Sherice Guajarod MD       Social History:   reports that he has never smoked. He has never used smokeless tobacco. He reports previous alcohol use. He reports previous drug use. Family History:  family history is not on file. Reviewed. Denies family history of sudden cardiac death, arrhythmia, premature CAD    Review of System:    · General ROS: negative for - chills, fever   · Psychological ROS: negative for - anxiety or depression  · Ophthalmic ROS: negative for - eye pain or loss of vision  · ENT ROS: negative for - epistaxis, headaches, nasal discharge, sore throat   · Allergy and Immunology ROS: negative for - hives, nasal congestion   · Hematological and Lymphatic ROS: negative for - bleeding problems, blood clots, bruising or jaundice  · Endocrine ROS: negative for - skin changes, temperature intolerance or unexpected weight changes  · Respiratory ROS: negative for - cough, hemoptysis, pleuritic pain, SOB, sputum changes or wheezing  · Cardiovascular ROS: Per HPI.    · Gastrointestinal ROS: negative for - abdominal pain, blood in stools, diarrhea, hematemesis, melena, nausea/vomiting or swallowing difficulty/pain  · Genito-Urinary ROS: negative for - dysuria or incontinence  · Musculoskeletal ROS: negative for - joint swelling or muscle pain  · Neurological ROS: negative for - confusion, dizziness, gait disturbance, headaches, numbness/tingling, seizures, speech problems, tremors, visual changes or weakness  · Dermatological ROS: negative for - rash    Physical Examination:  Vitals:    02/11/21 1301   BP: 112/70   Pulse: 71   Temp: 97.8 °F (36.6 °C)       · Constitutional: Oriented. No distress. · Head: Normocephalic and atraumatic. · Mouth/Throat: Oropharynx is clear and moist.   · Eyes: Conjunctivae normal. EOM are normal.   · Neck: Normal range of motion. Neck supple. No rigidity. No JVD present. · Cardiovascular: Normal rate, regular rhythm, S1&S2 and intact distal pulses. · Pulmonary/Chest: Bilateral respiratory sounds. No wheezes. No rhonchi. · Abdominal: Soft. Bowel sounds present. No distension, No tenderness. · Musculoskeletal: No tenderness. No edema    · Lymphadenopathy: Has no cervical adenopathy. · Neurological: Alert and oriented. Cranial nerve appears intact, No Gross deficit   · Skin: Skin is warm and dry. No rash noted. · Psychiatric: Has a normal mood, affect and behavior     Labs:  Reviewed. ECG: reviewed, controlled atrial fibrillation    Studies:   1. Event monitor:  none    2. Echo 11/7/19:   Summary   Normal left ventricular size and wall thickness. Normal left ventricular systolic function with an estimate ejection fraction   of 55%. There are no regional wall motion abnormalities. Indeterminate diastolic function. Bi-atrial enlargement. Estimated pulmonary artery systolic pressure is 18 mmHg assuming a right   atrial pressure of 3 mmHg. No prior echo for comparison. 3. Stress Test:  none    4.  Cath:  none    The MCOT, echocardiogram, stress test, and coronary angiography/PCI were reviewed by myself and used for my plan of care. Procedures:  1.  none    Assessment/Plan:   1. PAF, on Xarelto  2. HTN, stable on losartan, chlorthalidone, and amlodipine  3. Lung cancer, newly diagnosed    PAF symptomatic with decline in his functional capacity  Echo (11/2019) with EF of 55% and AKIRA. S/p DCCV 1/31/20 with improvement in symptoms    As he is very active person and has symptomatic AF, I have recommended rhythm control strategy and he was scheduled for AF ablation. However, during work-up for ablation, lung CA was found and he is currently undergoing radiation therapy. In SR today and feeling well. He denies recurrence of AF in the past 6 months. Follow up in 3-4 months with me    Thank you for allowing me to participate in the care of Pallavi Hunt. All questions and concerns were addressed to the patient/family. Alternatives to my treatment were discussed. Lazaro Cordero RN, am scribing for and in the presence of Dr. Hilario Lehman. 02/11/21 1:44 PM  Miguelito Ugalde RN    I, Kayleen Flood MD, personally performed the services prescribed in this documentation as scribed by Ms. Miguelito Ugalde RN in my presence and it is both accurate and complete.        Kayleen Flood MD  Cardiac Electrophysiology  [facility]

## 2021-02-10 ENCOUNTER — HOSPITAL ENCOUNTER (OUTPATIENT)
Dept: MRI IMAGING | Age: 51
Discharge: HOME | End: 2021-02-10
Payer: COMMERCIAL

## 2021-02-10 DIAGNOSIS — C34.12 SQUAMOUS CELL CARCINOMA OF BRONCHUS IN LEFT UPPER LOBE (HCC): ICD-10-CM

## 2021-02-10 DIAGNOSIS — C77.1 SECONDARY AND UNSPECIFIED MALIGNANT NEOPLASM OF INTRATHORACIC LYMPH NODES (HCC): ICD-10-CM

## 2021-02-10 PROCEDURE — 6360000004 HC RX CONTRAST MEDICATION: Performed by: INTERNAL MEDICINE

## 2021-02-10 PROCEDURE — 70553 MRI BRAIN STEM W/O & W/DYE: CPT

## 2021-02-10 PROCEDURE — A9579 GAD-BASE MR CONTRAST NOS,1ML: HCPCS | Performed by: INTERNAL MEDICINE

## 2021-02-10 RX ADMIN — GADOTERIDOL 20 ML: 279.3 INJECTION, SOLUTION INTRAVENOUS at 12:59

## 2021-02-11 ENCOUNTER — OFFICE VISIT (OUTPATIENT)
Dept: CARDIOLOGY CLINIC | Age: 51
End: 2021-02-11
Payer: COMMERCIAL

## 2021-02-11 VITALS
HEIGHT: 77 IN | BODY MASS INDEX: 29.99 KG/M2 | SYSTOLIC BLOOD PRESSURE: 112 MMHG | WEIGHT: 254 LBS | TEMPERATURE: 97.8 F | HEART RATE: 71 BPM | DIASTOLIC BLOOD PRESSURE: 70 MMHG

## 2021-02-11 DIAGNOSIS — I48.0 PAROXYSMAL ATRIAL FIBRILLATION (HCC): Primary | ICD-10-CM

## 2021-02-11 DIAGNOSIS — I10 ESSENTIAL HYPERTENSION: ICD-10-CM

## 2021-02-11 DIAGNOSIS — R91.8 PULMONARY MASS: ICD-10-CM

## 2021-02-11 PROCEDURE — 1036F TOBACCO NON-USER: CPT | Performed by: INTERNAL MEDICINE

## 2021-02-11 PROCEDURE — 3017F COLORECTAL CA SCREEN DOC REV: CPT | Performed by: INTERNAL MEDICINE

## 2021-02-11 PROCEDURE — 99213 OFFICE O/P EST LOW 20 MIN: CPT | Performed by: INTERNAL MEDICINE

## 2021-02-11 PROCEDURE — 93000 ELECTROCARDIOGRAM COMPLETE: CPT | Performed by: INTERNAL MEDICINE

## 2021-02-11 PROCEDURE — G8417 CALC BMI ABV UP PARAM F/U: HCPCS | Performed by: INTERNAL MEDICINE

## 2021-02-11 PROCEDURE — G8482 FLU IMMUNIZE ORDER/ADMIN: HCPCS | Performed by: INTERNAL MEDICINE

## 2021-02-11 PROCEDURE — G8427 DOCREV CUR MEDS BY ELIG CLIN: HCPCS | Performed by: INTERNAL MEDICINE

## 2021-05-21 ENCOUNTER — HOSPITAL ENCOUNTER (OUTPATIENT)
Dept: CT IMAGING | Age: 51
Discharge: HOME | End: 2021-05-21
Payer: COMMERCIAL

## 2021-05-21 DIAGNOSIS — C34.12 PRIMARY MALIGNANT NEOPLASM OF BRONCHUS OF LEFT UPPER LOBE (HCC): ICD-10-CM

## 2021-05-21 PROCEDURE — 74177 CT ABD & PELVIS W/CONTRAST: CPT

## 2021-05-21 PROCEDURE — 6360000004 HC RX CONTRAST MEDICATION: Performed by: STUDENT IN AN ORGANIZED HEALTH CARE EDUCATION/TRAINING PROGRAM

## 2021-05-21 RX ADMIN — IOHEXOL 50 ML: 240 INJECTION, SOLUTION INTRATHECAL; INTRAVASCULAR; INTRAVENOUS; ORAL at 15:26

## 2021-05-21 RX ADMIN — IOPAMIDOL 80 ML: 755 INJECTION, SOLUTION INTRAVENOUS at 15:27

## 2021-06-04 ENCOUNTER — HOSPITAL ENCOUNTER (OUTPATIENT)
Dept: CT IMAGING | Age: 51
Discharge: HOME | End: 2021-06-04
Payer: COMMERCIAL

## 2021-06-04 VITALS
OXYGEN SATURATION: 100 % | SYSTOLIC BLOOD PRESSURE: 127 MMHG | RESPIRATION RATE: 16 BRPM | HEART RATE: 58 BPM | DIASTOLIC BLOOD PRESSURE: 88 MMHG

## 2021-06-04 DIAGNOSIS — C34.12 SQUAMOUS CELL CARCINOMA OF BRONCHUS IN LEFT UPPER LOBE (HCC): ICD-10-CM

## 2021-06-04 LAB
APTT: 32.5 SEC (ref 24.2–36.2)
INR BLD: 1.01 (ref 0.86–1.14)
PLATELET # BLD: 167 K/UL (ref 135–450)
PROTHROMBIN TIME: 11.7 SEC (ref 10–13.2)

## 2021-06-04 PROCEDURE — 99151 MOD SED SAME PHYS/QHP <5 YRS: CPT

## 2021-06-04 PROCEDURE — 85730 THROMBOPLASTIN TIME PARTIAL: CPT

## 2021-06-04 PROCEDURE — 6360000002 HC RX W HCPCS: Performed by: RADIOLOGY

## 2021-06-04 PROCEDURE — 36415 COLL VENOUS BLD VENIPUNCTURE: CPT

## 2021-06-04 PROCEDURE — 85610 PROTHROMBIN TIME: CPT

## 2021-06-04 PROCEDURE — 7100000010 HC PHASE II RECOVERY - FIRST 15 MIN

## 2021-06-04 PROCEDURE — 85049 AUTOMATED PLATELET COUNT: CPT

## 2021-06-04 PROCEDURE — 2500000003 HC RX 250 WO HCPCS: Performed by: RADIOLOGY

## 2021-06-04 PROCEDURE — 88313 SPECIAL STAINS GROUP 2: CPT

## 2021-06-04 PROCEDURE — 7100000011 HC PHASE II RECOVERY - ADDTL 15 MIN

## 2021-06-04 PROCEDURE — 88307 TISSUE EXAM BY PATHOLOGIST: CPT

## 2021-06-04 RX ORDER — LIDOCAINE HYDROCHLORIDE 20 MG/ML
15 INJECTION, SOLUTION EPIDURAL; INFILTRATION; INTRACAUDAL; PERINEURAL ONCE
Status: COMPLETED | OUTPATIENT
Start: 2021-06-04 | End: 2021-06-04

## 2021-06-04 RX ORDER — MIDAZOLAM HYDROCHLORIDE 1 MG/ML
2 INJECTION INTRAMUSCULAR; INTRAVENOUS ONCE
Status: COMPLETED | OUTPATIENT
Start: 2021-06-04 | End: 2021-06-04

## 2021-06-04 RX ORDER — FENTANYL CITRATE 50 UG/ML
100 INJECTION, SOLUTION INTRAMUSCULAR; INTRAVENOUS ONCE
Status: COMPLETED | OUTPATIENT
Start: 2021-06-04 | End: 2021-06-04

## 2021-06-04 RX ORDER — ACETAMINOPHEN 325 MG/1
650 TABLET ORAL EVERY 4 HOURS PRN
Status: DISCONTINUED | OUTPATIENT
Start: 2021-06-04 | End: 2021-06-05 | Stop reason: HOSPADM

## 2021-06-04 RX ADMIN — LIDOCAINE HYDROCHLORIDE 15 ML: 20 INJECTION, SOLUTION EPIDURAL; INFILTRATION; INTRACAUDAL; PERINEURAL at 10:00

## 2021-06-04 RX ADMIN — FENTANYL CITRATE 100 MCG: 50 INJECTION, SOLUTION INTRAMUSCULAR; INTRAVENOUS at 10:00

## 2021-06-04 RX ADMIN — MIDAZOLAM HYDROCHLORIDE 2 MG: 2 INJECTION, SOLUTION INTRAMUSCULAR; INTRAVENOUS at 10:00

## 2021-06-04 ASSESSMENT — PAIN SCALES - GENERAL
PAINLEVEL_OUTOF10: 5
PAINLEVEL_OUTOF10: 0
PAINLEVEL_OUTOF10: 0

## 2021-06-04 NOTE — PROGRESS NOTES
Posterior liver biopsy site entry clean and dry with no drainage, bleeding or swelling at site. Pt alert and oriented with no complaints, spouse in room with pt. Discharged after brief visit to BR to void with no problem.

## 2021-06-04 NOTE — PLAN OF CARE
Brief Postoperative Note    Marla Castorena  YOB: 1970  [id number]    Pre-operative Diagnosis: adrenal mass  Liver mass    Post-operative Diagnosis: Same    Procedure: Liver Biopsy  Patient's placed prone however, lesion access ability due to the position of the kidney was not possible. The patient was then placed in the right lateral decubitus position. Both inspiration expiration imaging was undertaken. Needle passes were made under sterile technique following 2% lidocaine anesthetic however, there was significant radicular discomfort noted upon approaching the paraspinal neural foraminal region and therefore biopsy of the adrenal gland was not performed.     Anesthesia: local  conscious    Surgeons/Assistants: Amarjit Ayers    Estimated Blood Loss: <4.7OV    Complications: none    Specimens: were obtained  Liver lesion right lobe      Nasir Hutson MD MD  6/4/2021

## 2021-06-29 NOTE — PROGRESS NOTES
[address]   Cardiac Electrophysiology Consultation   Date: 7/1/2021  Reason for Consultation:  Atrial Fibrillation  Consult Requesting Physician: No att. providers found     Chief Complaint:   Chief Complaint   Patient presents with    Follow-up      HPI: Fabricio Kraus is a 46 y.o. man referred by Dr. Natalia Felix for AF. PMH of HTN. On 10/18/19, pt was at Dr. Bridgett Shah office with 3 day h/o fever/chills, SOB, and dry cough, treated for an URI.  ECG at that OV found AF, rate controlled. He denied CP, palpitations, heart racing, dizziness, lightheadedness, or change in energy. ECG in f/u on 10/31/19 also showed rate controlled AF, started on Xarelto.  Echo (11/2019) with EF of 55% and AKIRA. S/p DCCV on 1/31/20 with improvement in energy when in SR. He was scheduled for an AF ablation in 11/2020, but pre-op cardiac CT found a YUMIKO mass, later diagnosed as primary adenocarcinoma with mets to his adrenal gland, 2 ribs, and liver. Interval History: Today, he is here for f/u, presenting in [place] JORGE L Abel. He has completed palliative radiation therapy for the lung CA. He denies symptomatic recurrence of AF. Denies complaints of palpitations, dizziness, CP, SOB, orthopnea, presyncope, or syncope. 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 He hopes to start lifting weights and becoming more active. Atrial Fibrillation:    BMI    :   Body mass index is 29.05 kg/m².     Duration   :   10/1819    Symptoms   :   Decline in his functional capacity    Previous DCCV :  1/31/20    Previous AAD           :   none    Beta blocker  :   none    ACE / ARB  :   losartan    OAC   :   Xarelto    LVEF    :   55%    Left atrial size :   4.0 cm    AC   :   Not tested    Past Medical History:   Diagnosis Date    Hypertension         Past Surgical History:   Procedure Laterality Date    BRONCHOSCOPY  12/17/2020    BRONCHOSCOPY W/EBUS FNA performed by Lindsey Cantor MD at [address]  12/17/2020 BRONCHOSCOPY/ENDOBRONCHIAL LUNG BIOPSY performed by Boom Troy MD at [facility] LIVER PERCUTANEOUS  6/4/2021    CT NEEDLE BIOPSY LIVER PERCUTANEOUS 6/4/2021 [facility] CT SCAN    KNEE ARTHROSCOPY         Allergies: Allergies   Allergen Reactions    Eggs Or Egg-Derived Products        Medication:   Prior to Admission medications    Medication Sig Start Date End Date Taking? Authorizing Provider   rivaroxaban (XARELTO) 20 MG TABS tablet Take 1 tablet by mouth daily (with breakfast) 9/3/20  Yes TITA Rubio - CNP   losartan (COZAAR) 100 MG tablet Take 100 mg by mouth daily   Yes Historical Provider, MD   chlorthalidone (HYGROTON) 25 MG tablet TAKE 1 TABLET ON MONDAY. .. [facility] AND FRIDAY 10/21/19  Yes Historical Provider, MD   amLODIPine (NORVASC) 5 MG tablet Take 1 tablet by mouth daily 8/8/18  Yes Shalom Nichols MD   Dextromethorphan-guaiFENesin (CORICIDIN HBP CONGESTION/COUGH)  MG CAPS Take 1 capsule by mouth every 6 hours as needed (Congestion, cough) 8/8/18  Yes Shalom Nichols MD       Social History:   reports that he has never smoked. He has never used smokeless tobacco. He reports previous alcohol use. He reports previous drug use. Family History:  family history is not on file. Reviewed.  Denies family history of sudden cardiac death, arrhythmia, premature CAD    Review of System:    · General ROS: negative for - chills, fever   · Psychological ROS: negative for - anxiety or depression  · Ophthalmic ROS: negative for - eye pain or loss of vision  · ENT ROS: negative for - epistaxis, headaches, nasal discharge, sore throat   · Allergy and Immunology ROS: negative for - hives, nasal congestion   · Hematological and Lymphatic ROS: negative for - bleeding problems, blood clots, bruising or jaundice  · Endocrine ROS: negative for - skin changes, temperature intolerance or unexpected weight changes  · Respiratory ROS: negative for - cough, hemoptysis, pleuritic pain, SOB, sputum changes or wheezing  · Cardiovascular ROS: Per HPI. · Gastrointestinal ROS: negative for - abdominal pain, blood in stools, diarrhea, hematemesis, melena, nausea/vomiting or swallowing difficulty/pain  · Genito-Urinary ROS: negative for - dysuria or incontinence  · Musculoskeletal ROS: negative for - joint swelling or muscle pain  · Neurological ROS: negative for - confusion, dizziness, gait disturbance, headaches, numbness/tingling, seizures, speech problems, tremors, visual changes or weakness  · Dermatological ROS: negative for - rash    Physical Examination:  Vitals:    07/01/21 1333   BP: 120/70   Pulse: 69       · Constitutional: Oriented. No distress. · Head: Normocephalic and atraumatic. · Mouth/Throat: Oropharynx is clear and moist.   · Eyes: Conjunctivae normal. EOM are normal.   · Neck: Normal range of motion. Neck supple. No rigidity. No JVD present. · Cardiovascular: Normal rate, regular rhythm, S1&S2 and intact distal pulses. · Pulmonary/Chest: Bilateral respiratory sounds. No wheezes. No rhonchi. · Abdominal: Soft. Bowel sounds present. No distension, No tenderness. · Musculoskeletal: No tenderness. No edema    · Lymphadenopathy: Has no cervical adenopathy. · Neurological: Alert and oriented. Cranial nerve appears intact, No Gross deficit   · Skin: Skin is warm and dry. No rash noted. · Psychiatric: Has a normal mood, affect and behavior     Labs:  Reviewed. ECG: reviewed, SR 71    Studies:   1. Event monitor:  none    2. Echo 11/7/19:   Summary   Normal left ventricular size and wall thickness. Normal left ventricular systolic function with an estimate ejection fraction   of 55%. There are no regional wall motion abnormalities. Indeterminate diastolic function. Bi-atrial enlargement. Estimated pulmonary artery systolic pressure is 18 mmHg assuming a right   atrial pressure of 3 mmHg. No prior echo for comparison. 3. Stress Test:  none    4. Cath:  none    The MCOT, echocardiogram, stress test, and coronary angiography/PCI were reviewed by myself and used for my plan of care. Procedures:  1.  none    Assessment/Plan:   1. PAF, on Xarelto  2. HTN, stable on losartan, chlorthalidone, and amlodipine  3. Lung cancer, newly diagnosed    PAF symptomatic with decline in his functional capacity  Echo (11/2019) with EF of 55% and AKIRA. S/p DCCV 1/31/20 with improvement in symptoms    As he is very active person and has symptomatic AF, I have recommended rhythm control strategy and he was scheduled for AF ablation. However, during work-up for ablation, lung CA was found and he is currently undergoing radiation therapy. In SR today and feeling well. He denies recurrence of AF. We will continue his medications including oral anticoagulation. If there is any further recurrence of atrial fibrillation, then it is reasonable to consider atrial fibrillation ablation. Follow up in 6 months with me    Thank you for allowing me to participate in the care of Mando Johnson. All questions and concerns were addressed to the patient/family. Alternatives to my treatment were discussed. Letha Stephenson RN, am scribing for and in the presence of Dr. Alma Rosa De La Torre. 07/01/21 1:48 PM  Severo Sicks, RN    I, Elzbieta Dumont MD, personally performed the services prescribed in this documentation as scribed by Ms. Severo Sicks RN in my presence and it is both accurate and complete.        Elzbieta Dumont MD  Cardiac Electrophysiology  [facility]

## 2021-07-01 ENCOUNTER — OFFICE VISIT (OUTPATIENT)
Dept: CARDIOLOGY CLINIC | Age: 51
End: 2021-07-01
Payer: COMMERCIAL

## 2021-07-01 VITALS
WEIGHT: 245 LBS | BODY MASS INDEX: 29.05 KG/M2 | SYSTOLIC BLOOD PRESSURE: 120 MMHG | DIASTOLIC BLOOD PRESSURE: 70 MMHG | HEART RATE: 69 BPM

## 2021-07-01 DIAGNOSIS — I48.0 PAROXYSMAL ATRIAL FIBRILLATION (HCC): Primary | ICD-10-CM

## 2021-07-01 DIAGNOSIS — I10 ESSENTIAL HYPERTENSION: ICD-10-CM

## 2021-07-01 DIAGNOSIS — R91.8 PULMONARY MASS: ICD-10-CM

## 2021-07-01 PROCEDURE — 3017F COLORECTAL CA SCREEN DOC REV: CPT | Performed by: INTERNAL MEDICINE

## 2021-07-01 PROCEDURE — G8427 DOCREV CUR MEDS BY ELIG CLIN: HCPCS | Performed by: INTERNAL MEDICINE

## 2021-07-01 PROCEDURE — 93000 ELECTROCARDIOGRAM COMPLETE: CPT | Performed by: INTERNAL MEDICINE

## 2021-07-01 PROCEDURE — 1036F TOBACCO NON-USER: CPT | Performed by: INTERNAL MEDICINE

## 2021-07-01 PROCEDURE — G8417 CALC BMI ABV UP PARAM F/U: HCPCS | Performed by: INTERNAL MEDICINE

## 2021-07-01 PROCEDURE — 99213 OFFICE O/P EST LOW 20 MIN: CPT | Performed by: INTERNAL MEDICINE

## 2021-07-02 ENCOUNTER — HOSPITAL ENCOUNTER (OUTPATIENT)
Dept: CT IMAGING | Age: 51
Discharge: HOME | End: 2021-07-02
Payer: COMMERCIAL

## 2021-07-02 DIAGNOSIS — C79.72 SECONDARY MALIGNANT NEOPLASM OF LEFT ADRENAL GLAND (HCC): ICD-10-CM

## 2021-07-02 DIAGNOSIS — C79.52 SECONDARY MALIGNANT NEOPLASM OF BONE AND BONE MARROW (HCC): ICD-10-CM

## 2021-07-02 DIAGNOSIS — C34.12 SQUAMOUS CELL CARCINOMA OF BRONCHUS IN LEFT UPPER LOBE (HCC): ICD-10-CM

## 2021-07-02 DIAGNOSIS — C79.51 SECONDARY MALIGNANT NEOPLASM OF BONE AND BONE MARROW (HCC): ICD-10-CM

## 2021-07-02 DIAGNOSIS — C77.1 SECONDARY AND UNSPECIFIED MALIGNANT NEOPLASM OF INTRATHORACIC LYMPH NODES (HCC): ICD-10-CM

## 2021-07-02 PROCEDURE — 74177 CT ABD & PELVIS W/CONTRAST: CPT

## 2021-07-02 PROCEDURE — 6360000004 HC RX CONTRAST MEDICATION: Performed by: INTERNAL MEDICINE

## 2021-07-02 RX ADMIN — IOHEXOL 50 ML: 240 INJECTION, SOLUTION INTRATHECAL; INTRAVASCULAR; INTRAVENOUS; ORAL at 14:10

## 2021-07-02 RX ADMIN — IOPAMIDOL 80 ML: 755 INJECTION, SOLUTION INTRAVENOUS at 14:10

## 2021-07-19 ENCOUNTER — HOSPITAL ENCOUNTER (OUTPATIENT)
Dept: CT IMAGING | Age: 51
Discharge: HOME | End: 2021-07-19
Payer: COMMERCIAL

## 2021-07-19 VITALS
SYSTOLIC BLOOD PRESSURE: 135 MMHG | DIASTOLIC BLOOD PRESSURE: 85 MMHG | TEMPERATURE: 97.7 F | HEART RATE: 72 BPM | RESPIRATION RATE: 18 BRPM | OXYGEN SATURATION: 98 %

## 2021-07-19 DIAGNOSIS — C34.12 SQUAMOUS CELL CARCINOMA OF BRONCHUS IN LEFT UPPER LOBE (HCC): ICD-10-CM

## 2021-07-19 LAB
APTT: 33.4 SEC (ref 26.2–38.6)
INR BLD: 1.01 (ref 0.88–1.12)
PLATELET # BLD: 168 K/UL (ref 135–450)
PROTHROMBIN TIME: 11.4 SEC (ref 9.9–12.7)

## 2021-07-19 PROCEDURE — 6360000002 HC RX W HCPCS: Performed by: RADIOLOGY

## 2021-07-19 PROCEDURE — 85049 AUTOMATED PLATELET COUNT: CPT

## 2021-07-19 PROCEDURE — 7100000010 HC PHASE II RECOVERY - FIRST 15 MIN

## 2021-07-19 PROCEDURE — 36415 COLL VENOUS BLD VENIPUNCTURE: CPT

## 2021-07-19 PROCEDURE — 2500000003 HC RX 250 WO HCPCS: Performed by: RADIOLOGY

## 2021-07-19 PROCEDURE — 6370000000 HC RX 637 (ALT 250 FOR IP): Performed by: RADIOLOGY

## 2021-07-19 PROCEDURE — 85610 PROTHROMBIN TIME: CPT

## 2021-07-19 PROCEDURE — 99152 MOD SED SAME PHYS/QHP 5/>YRS: CPT

## 2021-07-19 PROCEDURE — 88342 IMHCHEM/IMCYTCHM 1ST ANTB: CPT

## 2021-07-19 PROCEDURE — 7100000011 HC PHASE II RECOVERY - ADDTL 15 MIN

## 2021-07-19 PROCEDURE — 88307 TISSUE EXAM BY PATHOLOGIST: CPT

## 2021-07-19 PROCEDURE — 88341 IMHCHEM/IMCYTCHM EA ADD ANTB: CPT

## 2021-07-19 PROCEDURE — 85730 THROMBOPLASTIN TIME PARTIAL: CPT

## 2021-07-19 RX ORDER — ACETAMINOPHEN 325 MG/1
650 TABLET ORAL EVERY 4 HOURS PRN
Status: DISCONTINUED | OUTPATIENT
Start: 2021-07-19 | End: 2021-07-20 | Stop reason: HOSPADM

## 2021-07-19 RX ORDER — FENTANYL CITRATE 50 UG/ML
50 INJECTION, SOLUTION INTRAMUSCULAR; INTRAVENOUS ONCE
Status: COMPLETED | OUTPATIENT
Start: 2021-07-19 | End: 2021-07-19

## 2021-07-19 RX ORDER — MIDAZOLAM HYDROCHLORIDE 1 MG/ML
1 INJECTION INTRAMUSCULAR; INTRAVENOUS ONCE
Status: COMPLETED | OUTPATIENT
Start: 2021-07-19 | End: 2021-07-19

## 2021-07-19 RX ORDER — LIDOCAINE HYDROCHLORIDE 20 MG/ML
15 INJECTION, SOLUTION EPIDURAL; INFILTRATION; INTRACAUDAL; PERINEURAL ONCE
Status: COMPLETED | OUTPATIENT
Start: 2021-07-19 | End: 2021-07-19

## 2021-07-19 RX ADMIN — ACETAMINOPHEN 650 MG: 325 TABLET ORAL at 13:13

## 2021-07-19 RX ADMIN — MIDAZOLAM HYDROCHLORIDE 1 MG: 2 INJECTION, SOLUTION INTRAMUSCULAR; INTRAVENOUS at 10:44

## 2021-07-19 RX ADMIN — LIDOCAINE HYDROCHLORIDE 15 ML: 20 INJECTION, SOLUTION EPIDURAL; INFILTRATION; INTRACAUDAL; PERINEURAL at 09:15

## 2021-07-19 RX ADMIN — FENTANYL CITRATE 50 MCG: 50 INJECTION, SOLUTION INTRAMUSCULAR; INTRAVENOUS at 09:15

## 2021-07-19 ASSESSMENT — PAIN SCALES - GENERAL
PAINLEVEL_OUTOF10: 0
PAINLEVEL_OUTOF10: 2

## 2021-07-19 NOTE — PROGRESS NOTES
When up to void states is having slight pain at right sided abdominal dressing. No change in appearance of dressing. To take tylenol and to stay for about and additional 25 minutes. Wife in agreement.

## 2021-07-19 NOTE — H&P
Patient:  Elton Soriano   :   1970      Relevant clinical history, particularly as it involves the pending procedure, was reviewed and discussed. The procedure including risks and benefits was discussed at length with the patient (or designated family member) and all questions were answered. Informed consent to proceed with the procedure was given. Vital signs were monitored and documented by the Radiology nurse. Targeted physical examination  Heart : regular rate and rhythm  Lungs : clear, breathing easily  Condition : stable    [facility] nurses notes reviewed and agreed. Past Medical History:        Diagnosis Date    Hypertension        Past Surgical History:           Procedure Laterality Date    BRONCHOSCOPY  2020    BRONCHOSCOPY W/EBUS FNA performed by Asiya Ortiz MD at [address] [place] [address]  2020    BRONCHOSCOPY/ENDOBRONCHIAL LUNG BIOPSY performed by Asiya Ortiz MD at [facility] LIVER PERCUTANEOUS  2021    CT NEEDLE BIOPSY LIVER PERCUTANEOUS 2021 [facility] CT SCAN    KNEE ARTHROSCOPY         Allergies:  Eggs or egg-derived products    Medications:   Home Meds  Current Outpatient Medications on File Prior to Encounter   Medication Sig Dispense Refill    rivaroxaban (XARELTO) 20 MG TABS tablet Take 1 tablet by mouth daily (with breakfast) 90 tablet 3    losartan (COZAAR) 100 MG tablet Take 100 mg by mouth daily      chlorthalidone (HYGROTON) 25 MG tablet TAKE 1 TABLET ON MONDAY. .. WEDNESDAY AND FRIDAY  3    amLODIPine (NORVASC) 5 MG tablet Take 1 tablet by mouth daily 30 tablet 3    Dextromethorphan-guaiFENesin (CORICIDIN HBP CONGESTION/COUGH)  MG CAPS Take 1 capsule by mouth every 6 hours as needed (Congestion, cough) 20 capsule 0     No current facility-administered medications on file prior to encounter.        Current Meds  fentaNYL (SUBLIMAZE) injection 50 mcg, Once  midazolam (VERSED) injection 1 mg, Once  lidocaine PF 2 % injection 15 mL, Once          ASA 2 - Patient with mild systemic disease with no functional limitations    II (soft palate, uvula, fauces visible)    Activity:  2 - Able to move 4 extremities voluntarily on command  Respiration:  2 - Able to breathe deeply and cough freely  Circulation:  2 - BP+/- 20mmHg of normal  Consciousness:  2 - Fully awake  Oxygen Saturation (color):  2 - Able to maintain oxygen saturation >92% on room air    Sedation : Moderate sedation planned

## 2021-07-19 NOTE — PROGRESS NOTES
Ambulatory Surgery/Procedure Discharge Note    Vitals:    07/19/21 1332   BP: 135/85   Pulse: 72   Resp: 18   Temp: 97.7 °F (36.5 °C)   SpO2: 98%       In: 222 [P.O.:222]  Out: -     Restroom use offered before discharge. Yes    Pain assessment:  Right side of abdomen bandaids clean dry and intact, no redness swelling or drainage. Pain Level: 0        Patient discharged to home/self care.  Patient discharged via wheel chair by transporter to waiting family/S.O.       7/19/2021 1:36 PM

## 2021-07-19 NOTE — PROGRESS NOTES
Patient recieved from procedural area. Biopsy site free from signs and symptoms of bleed and infection. Family at bedside and call light within reach.

## 2021-07-19 NOTE — PROCEDURES
IR Brief Postoperative Note    Melissa Counter  YOB: 1970  [id number]    Pre-operative Diagnosis: liver lesion    Post-operative Diagnosis: Same    Procedure: CT and US guided liver bx, right lobe    Anesthesia: moderate    Surgeons/Assistants: andrew    Estimated Blood Loss: Minimal    Complications: none    Specimens: were obtained    See full procedure dictation to follow      Freya Morris MD MD  7/19/2021

## 2021-07-19 NOTE — SEDATION DOCUMENTATION
IMAGING SERVICES NURSING PROGRESS NOTE    Procedure:  Liver Biopsy  July 19, 2021  Melissa Maria      Allergies: Allergies   Allergen Reactions    Eggs Or Egg-Derived Products        Vitals:    07/19/21 0915   BP: 119/77   Pulse: 67   Resp: 16   SpO2: 98%       Recent lab work reviewed with MD: yes   Procedure explained to patient by MD: yes   Informed consent obtained:yes  Family with patient:wife in waiting room    Mental Status:  Normal  Readiness to learn:  Yes  Barriers to learning: No    Pain Assessment Pre-Procedure:  Pain Present:  no  Pain Score:  0  Pain Quality/Description:      Time out Procedure Verification with:  [x] RN  [x] Physician  [x] Patient  [x] Other: CT Technologist  Procedure site marked, if applicable:  Yes    Note: Patient arrived A & O x 4, denies pain, breathing easily on room air, Spoke to Dr. Memo Holbrook prior to procedure. Procedural sedation:  Fentanyl: 50 mcg  Versed:   1 mg  Post Procedureal Note:  Patient tolerated procedure well. Biopsy performed. Breathing easily on room air. Report given to  [employer]. Patient transported in stable conditon to room 6.     Pain Assessment Post-Procedure:  Pain Present:  no  Pain Score:  0  Pain Quality/Description:      Plan of Care Goals:  Safety measures met:  Yes  Patient understands explanation of procedure:  Yes    Time in:  0915  Time out:  MORGAN Shane.  R.N. 7/19/2021

## 2021-08-25 ENCOUNTER — HOSPITAL ENCOUNTER (OUTPATIENT)
Dept: CT IMAGING | Age: 51
Discharge: HOME | End: 2021-08-25
Payer: COMMERCIAL

## 2021-08-25 DIAGNOSIS — C79.52 SECONDARY MALIGNANT NEOPLASM OF BONE AND BONE MARROW (HCC): ICD-10-CM

## 2021-08-25 DIAGNOSIS — C79.51 SECONDARY MALIGNANT NEOPLASM OF BONE AND BONE MARROW (HCC): ICD-10-CM

## 2021-08-25 DIAGNOSIS — C78.7 SECONDARY MALIGNANT NEOPLASM OF LIVER (HCC): ICD-10-CM

## 2021-08-25 DIAGNOSIS — C34.12 SQUAMOUS CELL CARCINOMA OF BRONCHUS IN LEFT UPPER LOBE (HCC): ICD-10-CM

## 2021-08-25 DIAGNOSIS — C79.72 SECONDARY MALIGNANT NEOPLASM OF LEFT ADRENAL GLAND (HCC): ICD-10-CM

## 2021-08-25 DIAGNOSIS — C77.1 SECONDARY AND UNSPECIFIED MALIGNANT NEOPLASM OF INTRATHORACIC LYMPH NODES (HCC): ICD-10-CM

## 2021-08-25 PROCEDURE — 74177 CT ABD & PELVIS W/CONTRAST: CPT

## 2021-08-25 PROCEDURE — 6360000004 HC RX CONTRAST MEDICATION: Performed by: INTERNAL MEDICINE

## 2021-08-25 RX ADMIN — IOPAMIDOL 80 ML: 755 INJECTION, SOLUTION INTRAVENOUS at 15:46

## 2021-08-25 RX ADMIN — IOHEXOL 50 ML: 240 INJECTION, SOLUTION INTRATHECAL; INTRAVASCULAR; INTRAVENOUS; ORAL at 15:45

## 2021-11-15 ENCOUNTER — HOSPITAL ENCOUNTER (OUTPATIENT)
Dept: CT IMAGING | Age: 51
Discharge: HOME | End: 2021-11-15
Payer: COMMERCIAL

## 2021-11-15 DIAGNOSIS — C34.12 SQUAMOUS CELL CARCINOMA OF BRONCHUS IN LEFT UPPER LOBE (HCC): ICD-10-CM

## 2021-11-15 PROCEDURE — 74177 CT ABD & PELVIS W/CONTRAST: CPT

## 2021-11-15 PROCEDURE — 6360000004 HC RX CONTRAST MEDICATION: Performed by: INTERNAL MEDICINE

## 2021-11-15 RX ADMIN — IOHEXOL 50 ML: 240 INJECTION, SOLUTION INTRATHECAL; INTRAVASCULAR; INTRAVENOUS; ORAL at 11:52

## 2021-11-15 RX ADMIN — IOPAMIDOL 80 ML: 755 INJECTION, SOLUTION INTRAVENOUS at 11:51

## 2021-11-30 ENCOUNTER — APPOINTMENT (OUTPATIENT)
Dept: MRI IMAGING | Age: 51
DRG: 054 | End: 2021-11-30
Payer: COMMERCIAL

## 2021-11-30 ENCOUNTER — HOSPITAL ENCOUNTER (INPATIENT)
Age: 51
LOS: 3 days | Discharge: HOSPICE | DRG: 054 | End: 2021-12-03
Attending: EMERGENCY MEDICINE | Admitting: INTERNAL MEDICINE
Payer: COMMERCIAL

## 2021-11-30 ENCOUNTER — APPOINTMENT (OUTPATIENT)
Dept: CT IMAGING | Age: 51
DRG: 054 | End: 2021-11-30
Payer: COMMERCIAL

## 2021-11-30 DIAGNOSIS — R41.82 ALTERED MENTAL STATUS, UNSPECIFIED ALTERED MENTAL STATUS TYPE: Primary | ICD-10-CM

## 2021-11-30 PROBLEM — G93.89 BRAIN MASS: Status: ACTIVE | Noted: 2021-11-30

## 2021-11-30 LAB
ALBUMIN SERPL-MCNC: 3.8 G/DL (ref 3.4–5)
ALP BLD-CCNC: 155 U/L (ref 40–129)
ALT SERPL-CCNC: 98 U/L (ref 10–40)
AMMONIA: 13 UMOL/L (ref 16–60)
AMORPHOUS: ABNORMAL /HPF
ANION GAP SERPL CALCULATED.3IONS-SCNC: 16 MMOL/L (ref 3–16)
ANTI-XA UNFRAC HEPARIN: 1.09 IU/ML (ref 0.3–0.7)
AST SERPL-CCNC: 89 U/L (ref 15–37)
BACTERIA: ABNORMAL /HPF
BASOPHILS ABSOLUTE: 0 K/UL (ref 0–0.2)
BASOPHILS RELATIVE PERCENT: 0.3 %
BILIRUB SERPL-MCNC: 2 MG/DL (ref 0–1)
BILIRUBIN DIRECT: 0.5 MG/DL (ref 0–0.3)
BILIRUBIN URINE: ABNORMAL
BILIRUBIN, INDIRECT: 1.5 MG/DL (ref 0–1)
BLOOD, URINE: NEGATIVE
BUN BLDV-MCNC: 16 MG/DL (ref 7–20)
CALCIUM SERPL-MCNC: 9.5 MG/DL (ref 8.3–10.6)
CHLORIDE BLD-SCNC: 98 MMOL/L (ref 99–110)
CLARITY: CLEAR
CO2: 22 MMOL/L (ref 21–32)
COLOR: YELLOW
CREAT SERPL-MCNC: 1.1 MG/DL (ref 0.9–1.3)
EOSINOPHILS ABSOLUTE: 0.1 K/UL (ref 0–0.6)
EOSINOPHILS RELATIVE PERCENT: 0.5 %
EPITHELIAL CELLS, UA: ABNORMAL /HPF (ref 0–5)
GFR AFRICAN AMERICAN: >60
GFR NON-AFRICAN AMERICAN: >60
GLUCOSE BLD-MCNC: 89 MG/DL (ref 70–99)
GLUCOSE BLD-MCNC: 94 MG/DL (ref 70–99)
GLUCOSE URINE: NEGATIVE MG/DL
HCT VFR BLD CALC: 22.8 % (ref 40.5–52.5)
HEMOGLOBIN: 7.6 G/DL (ref 13.5–17.5)
HYALINE CASTS: ABNORMAL /LPF (ref 0–2)
INR BLD: 1.68 (ref 0.88–1.12)
KETONES, URINE: 40 MG/DL
LEUKOCYTE ESTERASE, URINE: NEGATIVE
LYMPHOCYTES ABSOLUTE: 1.1 K/UL (ref 1–5.1)
LYMPHOCYTES RELATIVE PERCENT: 9 %
MCH RBC QN AUTO: 34.6 PG (ref 26–34)
MCHC RBC AUTO-ENTMCNC: 33.3 G/DL (ref 31–36)
MCV RBC AUTO: 103.9 FL (ref 80–100)
MICROSCOPIC EXAMINATION: YES
MONOCYTES ABSOLUTE: 1 K/UL (ref 0–1.3)
MONOCYTES RELATIVE PERCENT: 8.7 %
MUCUS: ABNORMAL /LPF
NEUTROPHILS ABSOLUTE: 9.7 K/UL (ref 1.7–7.7)
NEUTROPHILS RELATIVE PERCENT: 81.5 %
NITRITE, URINE: NEGATIVE
PDW BLD-RTO: 18.6 % (ref 12.4–15.4)
PERFORMED ON: NORMAL
PH UA: 6.5 (ref 5–8)
PLATELET # BLD: 276 K/UL (ref 135–450)
PMV BLD AUTO: 7.4 FL (ref 5–10.5)
POTASSIUM REFLEX MAGNESIUM: 3.6 MMOL/L (ref 3.5–5.1)
PROTEIN UA: ABNORMAL MG/DL
PROTHROMBIN TIME: 19.4 SEC (ref 9.9–12.7)
RBC # BLD: 2.2 M/UL (ref 4.2–5.9)
RBC UA: ABNORMAL /HPF (ref 0–4)
SODIUM BLD-SCNC: 136 MMOL/L (ref 136–145)
SPECIFIC GRAVITY UA: 1.01 (ref 1–1.03)
TOTAL PROTEIN: 7.7 G/DL (ref 6.4–8.2)
URINE TYPE: ABNORMAL
UROBILINOGEN, URINE: >=8 E.U./DL
WBC # BLD: 11.8 K/UL (ref 4–11)
WBC UA: ABNORMAL /HPF (ref 0–5)

## 2021-11-30 PROCEDURE — 70450 CT HEAD/BRAIN W/O DYE: CPT

## 2021-11-30 PROCEDURE — 85520 HEPARIN ASSAY: CPT

## 2021-11-30 PROCEDURE — 2580000003 HC RX 258: Performed by: STUDENT IN AN ORGANIZED HEALTH CARE EDUCATION/TRAINING PROGRAM

## 2021-11-30 PROCEDURE — 2000000000 HC ICU R&B

## 2021-11-30 PROCEDURE — 6360000002 HC RX W HCPCS: Performed by: STUDENT IN AN ORGANIZED HEALTH CARE EDUCATION/TRAINING PROGRAM

## 2021-11-30 PROCEDURE — 80048 BASIC METABOLIC PNL TOTAL CA: CPT

## 2021-11-30 PROCEDURE — 99284 EMERGENCY DEPT VISIT MOD MDM: CPT

## 2021-11-30 PROCEDURE — 83540 ASSAY OF IRON: CPT

## 2021-11-30 PROCEDURE — 96375 TX/PRO/DX INJ NEW DRUG ADDON: CPT

## 2021-11-30 PROCEDURE — 82140 ASSAY OF AMMONIA: CPT

## 2021-11-30 PROCEDURE — 85025 COMPLETE CBC W/AUTO DIFF WBC: CPT

## 2021-11-30 PROCEDURE — 85610 PROTHROMBIN TIME: CPT

## 2021-11-30 PROCEDURE — 36415 COLL VENOUS BLD VENIPUNCTURE: CPT

## 2021-11-30 PROCEDURE — 82746 ASSAY OF FOLIC ACID SERUM: CPT

## 2021-11-30 PROCEDURE — 2580000003 HC RX 258: Performed by: NURSE PRACTITIONER

## 2021-11-30 PROCEDURE — 82607 VITAMIN B-12: CPT

## 2021-11-30 PROCEDURE — 6360000002 HC RX W HCPCS: Performed by: NURSE PRACTITIONER

## 2021-11-30 PROCEDURE — 82728 ASSAY OF FERRITIN: CPT

## 2021-11-30 PROCEDURE — 83550 IRON BINDING TEST: CPT

## 2021-11-30 PROCEDURE — 80076 HEPATIC FUNCTION PANEL: CPT

## 2021-11-30 PROCEDURE — 81001 URINALYSIS AUTO W/SCOPE: CPT

## 2021-11-30 PROCEDURE — 96365 THER/PROPH/DIAG IV INF INIT: CPT

## 2021-11-30 RX ORDER — LORAZEPAM 1 MG/1
TABLET ORAL
Status: ON HOLD | COMMUNITY
Start: 2021-11-03 | End: 2021-12-03 | Stop reason: HOSPADM

## 2021-11-30 RX ORDER — ONDANSETRON 4 MG/1
4 TABLET, ORALLY DISINTEGRATING ORAL EVERY 8 HOURS PRN
Status: DISCONTINUED | OUTPATIENT
Start: 2021-11-30 | End: 2021-12-03

## 2021-11-30 RX ORDER — LORAZEPAM 2 MG/ML
INJECTION INTRAMUSCULAR
Status: COMPLETED
Start: 2021-11-30 | End: 2021-12-01

## 2021-11-30 RX ORDER — LOSARTAN POTASSIUM 100 MG/1
100 TABLET ORAL DAILY
Status: DISCONTINUED | OUTPATIENT
Start: 2021-12-01 | End: 2021-12-03 | Stop reason: HOSPADM

## 2021-11-30 RX ORDER — LORAZEPAM 2 MG/ML
2 INJECTION INTRAMUSCULAR
Status: COMPLETED | OUTPATIENT
Start: 2021-11-30 | End: 2021-12-01

## 2021-11-30 RX ORDER — DEXAMETHASONE SODIUM PHOSPHATE 4 MG/ML
4 INJECTION, SOLUTION INTRA-ARTICULAR; INTRALESIONAL; INTRAMUSCULAR; INTRAVENOUS; SOFT TISSUE EVERY 6 HOURS
Status: DISCONTINUED | OUTPATIENT
Start: 2021-11-30 | End: 2021-11-30

## 2021-11-30 RX ORDER — DEXAMETHASONE SODIUM PHOSPHATE 4 MG/ML
10 INJECTION, SOLUTION INTRA-ARTICULAR; INTRALESIONAL; INTRAMUSCULAR; INTRAVENOUS; SOFT TISSUE EVERY 6 HOURS
Status: DISCONTINUED | OUTPATIENT
Start: 2021-11-30 | End: 2021-11-30 | Stop reason: SDUPTHER

## 2021-11-30 RX ORDER — DEXAMETHASONE SODIUM PHOSPHATE 4 MG/ML
4 INJECTION, SOLUTION INTRA-ARTICULAR; INTRALESIONAL; INTRAMUSCULAR; INTRAVENOUS; SOFT TISSUE EVERY 6 HOURS
Status: DISCONTINUED | OUTPATIENT
Start: 2021-11-30 | End: 2021-12-03 | Stop reason: HOSPADM

## 2021-11-30 RX ORDER — LABETALOL HYDROCHLORIDE 5 MG/ML
10 INJECTION, SOLUTION INTRAVENOUS EVERY 6 HOURS PRN
Status: DISCONTINUED | OUTPATIENT
Start: 2021-11-30 | End: 2021-12-02

## 2021-11-30 RX ORDER — LORAZEPAM 2 MG/ML
1 INJECTION INTRAMUSCULAR ONCE
Status: COMPLETED | OUTPATIENT
Start: 2021-11-30 | End: 2021-11-30

## 2021-11-30 RX ORDER — HALOPERIDOL 5 MG/ML
5 INJECTION INTRAMUSCULAR ONCE
Status: COMPLETED | OUTPATIENT
Start: 2021-11-30 | End: 2021-11-30

## 2021-11-30 RX ORDER — HYDRALAZINE HYDROCHLORIDE 20 MG/ML
10 INJECTION INTRAMUSCULAR; INTRAVENOUS EVERY 6 HOURS PRN
Status: DISCONTINUED | OUTPATIENT
Start: 2021-11-30 | End: 2021-12-03 | Stop reason: HOSPADM

## 2021-11-30 RX ORDER — SODIUM CHLORIDE 9 MG/ML
50 INJECTION, SOLUTION INTRAVENOUS ONCE
Status: COMPLETED | OUTPATIENT
Start: 2021-11-30 | End: 2021-11-30

## 2021-11-30 RX ORDER — DEXAMETHASONE SODIUM PHOSPHATE 4 MG/ML
10 INJECTION, SOLUTION INTRA-ARTICULAR; INTRALESIONAL; INTRAMUSCULAR; INTRAVENOUS; SOFT TISSUE ONCE
Status: COMPLETED | OUTPATIENT
Start: 2021-11-30 | End: 2021-11-30

## 2021-11-30 RX ORDER — ONDANSETRON 2 MG/ML
4 INJECTION INTRAMUSCULAR; INTRAVENOUS EVERY 6 HOURS PRN
Status: DISCONTINUED | OUTPATIENT
Start: 2021-11-30 | End: 2021-12-03

## 2021-11-30 RX ORDER — AMLODIPINE BESYLATE 5 MG/1
5 TABLET ORAL DAILY
Status: DISCONTINUED | OUTPATIENT
Start: 2021-12-01 | End: 2021-12-03 | Stop reason: HOSPADM

## 2021-11-30 RX ORDER — ACETAMINOPHEN 325 MG/1
650 TABLET ORAL EVERY 4 HOURS PRN
Status: DISCONTINUED | OUTPATIENT
Start: 2021-11-30 | End: 2021-12-03 | Stop reason: HOSPADM

## 2021-11-30 RX ADMIN — LORAZEPAM 1 MG: 2 INJECTION INTRAMUSCULAR; INTRAVENOUS at 22:21

## 2021-11-30 RX ADMIN — DEXAMETHASONE SODIUM PHOSPHATE 10 MG: 4 INJECTION, SOLUTION INTRAMUSCULAR; INTRAVENOUS at 16:46

## 2021-11-30 RX ADMIN — LEVETIRACETAM 1000 MG: 100 INJECTION, SOLUTION INTRAVENOUS at 16:47

## 2021-11-30 RX ADMIN — DEXAMETHASONE SODIUM PHOSPHATE 4 MG: 4 INJECTION, SOLUTION INTRAMUSCULAR; INTRAVENOUS at 22:21

## 2021-11-30 RX ADMIN — SODIUM CHLORIDE 50 ML: 9 INJECTION, SOLUTION INTRAVENOUS at 19:03

## 2021-11-30 RX ADMIN — HALOPERIDOL LACTATE 5 MG: 5 INJECTION, SOLUTION INTRAMUSCULAR at 23:08

## 2021-11-30 RX ADMIN — PROTHROMBIN, COAGULATION FACTOR VII HUMAN, COAGULATION FACTOR IX HUMAN, COAGULATION FACTOR X HUMAN, PROTEIN C, PROTEIN S HUMAN, AND WATER 4902 UNITS: KIT at 19:03

## 2021-11-30 NOTE — H&P
ICU HISTORY AND PHYSICAL      Hospital Day: 0  ICU Day: Patient does not have an ICU stay during this admission. Code:Full   Admit Date: 11/30/2021    PCP: Babatunde Gooden MD                                  CC: AMS, confusion after fall    HISTORYOF PRESENT ILLNESS:   Mr. Brooke Quispe is a 45 yo male with PMH afib on xarelto and stage IVb pulmonary adenocarcinoma who presented with AMS after fall subsequently found to have hemorrhagic brain metastases. Patient has had increased AMS and weakness after fall on Sunday. Apparently he has been going into other people's homes and has been dressing himself incorrectly. Patient was diagnosed with pulmonary adenocarcinoma in 2020 that has metastasized despite treatment at [facility]. He is currently on pembrolizumab, pemetrexed, carboplatin. Cancer has metastasized to adrenal glands, liver, lungs and now brain. CT head today demonstrates a 3.1 cm hemorrhagic mass in L occipital lobe with extensive surrounding vasogenic edema and local mass effect with additional subcentimeter hypoattenuating foci in bilateral frontal and right temporal lobes concerning for metastases. Of note, patient had brain MRI in February 2021 which was normal.     Lab workup remarkable for abnormal LFTs, anemia with Hgb 7.6 and elevated PT/INR (takes Xarelto). [place] Heavenly was administered in ED. Patient with wife at bedside. She is from [facility] and tearful. The patient is a former .      PAST HISTORY:     Past Medical History:   Diagnosis Date    Hypertension        Past Surgical History:   Procedure Laterality Date    BRONCHOSCOPY  12/17/2020    BRONCHOSCOPY W/EBUS FNA performed by Jannet Jessica MD at [address] [place] [address]  12/17/2020    BRONCHOSCOPY/ENDOBRONCHIAL LUNG BIOPSY performed by Jannet Jessica MD at [facility] LIVER PERCUTANEOUS  6/4/2021    CT NEEDLE BIOPSY LIVER PERCUTANEOUS 6/4/2021 [facility] CT SCAN    CT NEEDLE BIOPSY LIVER PERCUTANEOUS  7/19/2021    CT NEEDLE BIOPSY LIVER PERCUTANEOUS [facility]19/2021 [facility]    KNEE ARTHROSCOPY         Social History:   The patient lives at home with wife. Alcohol:  none  Illicit drugs: no use  Tobacco:  none    Family History:  History reviewed. No pertinent family history. Patient encephalopathic  MEDICATIONS:     No current facility-administered medications on file prior to encounter. Current Outpatient Medications on File Prior to Encounter   Medication Sig Dispense Refill    LORazepam (ATIVAN) 1 MG tablet       rivaroxaban (XARELTO) 20 MG TABS tablet Take 1 tablet by mouth daily (with breakfast) 90 tablet 3    losartan (COZAAR) 100 MG tablet Take 100 mg by mouth daily      chlorthalidone (HYGROTON) 25 MG tablet TAKE 1 TABLET ON MONDAY. .. WEDNESDAY AND FRIDAY  3    amLODIPine (NORVASC) 5 MG tablet Take 1 tablet by mouth daily 30 tablet 3    Dextromethorphan-guaiFENesin (CORICIDIN HBP CONGESTION/COUGH)  MG CAPS Take 1 capsule by mouth every 6 hours as needed (Congestion, cough) 20 capsule 0         Scheduled Meds:   dexamethasone  10 mg IntraVENous Q6H    Or    dexamethasone  4 mg IntraVENous Q6H    levetiracetam  1,000 mg IntraVENous Q12H        Allergies: Allergies   Allergen Reactions    Eggs Or Egg-Derived Products        REVIEW OF SYSTEMS:       Difficult to obtain ROS as patient has significant AMS. Denies any pain. PHYSICAL EXAM:       Vitals: /80   Pulse 81   Temp 98.4 °F (36.9 °C) (Oral)   Resp 30   Ht 6' 4\" (1.93 m)   Wt 220 lb (99.8 kg)   SpO2 100%   BMI 26.78 kg/m²     I/O:  No intake or output data in the 24 hours ending 11/30/21 1918  No intake/output data recorded. No intake/output data recorded.     Physical Exam    Const: well-developed male lying in bed with eyes closed, not responding to questions, somnolent  Head: NCAT   Eyes: PERRLA, EOMI   Neck: supple, no thyromegaly, no LAD   Cardiac: RRR, no m/r/g  Pulm: CTAB   Abdo: soft, nontender, nondistended  Skin: no lesions, no rash  Ext: no peripheral edema, peripheral pulses intact   Neuro: Patient oriented to self on my exam, does not cooperate which limited neuro exam, somnolent but does follow commands, moves all 4 extremities, no aphasia, no facial droop    DATA:       Labs:  CBC:   Recent Labs     21  1550   WBC 11.8*   HGB 7.6*   HCT 22.8*          BMP:   Recent Labs     21  1550      K 3.6   CL 98*   CO2 22   BUN 16   CREATININE 1.1   GLUCOSE 94     LFT's:   Recent Labs     21  1550   AST 89*   ALT 98*   BILITOT 2.0*   ALKPHOS 155*     Troponin: No results for input(s): TROPONINI in the last 72 hours. BNP: No results for input(s): BNP in the last 72 hours. ABGs: No results for input(s): PHART, ZFD6NRC, PO2ART in the last 72 hours. INR:  Recent Labs     21  1550   INR 1.68*       U/A:  Recent Labs     21  1550   COLORU Yellow   PHUR 6.5   WBCUA 3-5   RBCUA None seen   MUCUS 4+*   BACTERIA 4+*   CLARITYU Clear   SPECGRAV 1.015   LEUKOCYTESUR Negative   UROBILINOGEN >=8.0*   BILIRUBINUR MODERATE*   BLOODU Negative   GLUCOSEU Negative   AMORPHOUS 1+       CT Head WO Contrast   Final Result      3.1 cm hemorrhagic mass in left occipital lobe with extensive surrounding vasogenic edema and local mass effect. Additional subcentimeter hypoattenuating foci in bilateral frontal and right temporal lobes. Imaging findings are concerning for metastasis. Recommended follow-up with contrast-enhanced MRI. MRI BRAIN W WO CONTRAST    (Results Pending)       ASSESSMENT AND PLAN:   Mr. Mejia Chilhowee is a 45 yo male with PMH afib on Xarelto and stage IVb pulmonary adenocarcinoma who presented with AMS after fall subsequently found to have hemorrhagic brain metastases. Patient admitted to [facility] ICU for close observation.      Left occipital hemorrhagic mass  - MRI pending  - q1h neuro checks  - Keppra 1000mg BID for seizure ppx  - decadron 10 mg once then 4 mg Q6H   - PPI and SSI   - HOB >30 degrees   - Nsurg consulted  - oncology consulted     HTN  - patient at goal BP less than 160  - home amlodipine and losartan   - hold off on restarting chlorthalidone   - labetalol and hydralazine PRNs    Anemia  Hgb 10.1 on 10/28/21, now 7.6. Has been trending down since started new chemo regimen on 8/26/21. Likely due to chemo, cancer and hemorragic metastasis. Cannot rule out GI bleed or intraabdominal bleed. - monitor with daily CBC  - consider CTAP if drops more   - iron studies     Paroxysmal Afib  - rate controlled  - previously on xarelto, now reversed with kcentra in ED    Nausea and vomiting  - zofran PRN    Leukocytosis   Patient afebrile and not septic appearing. Not tachycardic or hypotensive. Leukocytosis likely reactive. - will monitor with daily CBC     Code Status: Full code   FEN: NPO  PPX:  SCD  DISPO: ICU     This patient has been staffed and discussed with Dr. Misty Farmer.   -----------------------------  Dudley Capellan MD PGY-1   I saw the patient independently from the resident . I discussed the care with the resident. I personally reviewed the HPI, PH, FH, SH, ROS and medications. I repeated pertinent portions of the examination and reviewed the relevant imaging and laboratory data. I agree with the findings, assessment and plan as documented. addition to:Patient is a 49-year-old male presented with encephalopathy admitted for left occipital hemorrhagic mass. .  Patient's management ICU protocol care, every neurochecks. CCT: 32 minutes.

## 2021-11-30 NOTE — LETTER
[address] ICU  [address] [place] [address] Av  Phone: [phone]    No name on file. December 1, 2021     Daniel Olvera MD  [facility], [address] [address]    Patient: Deborah Villa   MR Number: [id number]   YOB: 1970   Date of Visit: 11/30/2021       Dear Daniel Olvera: Thank you for referring Shaylee Kenny to me for evaluation/treatment. Below are the relevant portions of my assessment and plan of care. If you have questions, please do not hesitate to call me. I look forward to following Osvaldo along with you. Sincerely,      No name on file.

## 2021-11-30 NOTE — CONSULTS
NEUROSURGERY CONSULT NOTE    Gisel Leonard  [id number]   1970 11/30/2021    Requesting physician: No admitting provider for patient encounter. Reason for consultation:    History of present illness: Patient is a 46 y.o. male  has a past medical history of Hypertension and Metastatic Lung CA. Patient  presented on 11/30/2021 to [facility] ED w/AMS. Patient is not a proper historian so information provided by spouse who was at bedside. 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 The patient started acting strange Monday, went to [facility], and got IV fluids. However, his mental status worsened today. CT Head shows hemorrhagic mass in left occipital lobe. ROS:   ANITRA 2/2 encephalopathy    Allergies   Allergen Reactions    Eggs Or Egg-Derived Products        Past Medical History:   Diagnosis Date    Hypertension         Past Surgical History:   Procedure Laterality Date    BRONCHOSCOPY  12/17/2020    BRONCHOSCOPY W/EBUS FNA performed by Hortensia Lopez MD at [address] [place] [address]  12/17/2020    BRONCHOSCOPY/ENDOBRONCHIAL LUNG BIOPSY performed by Hortensia Lopez MD at [facility] CT NEEDLE BIOPSY LIVER PERCUTANEOUS  6/4/2021    CT NEEDLE BIOPSY LIVER PERCUTANEOUS 6/4/2021 [address] SCAN    CT NEEDLE BIOPSY LIVER PERCUTANEOUS  7/19/2021    CT NEEDLE BIOPSY LIVER PERCUTANEOUS 7/19/2021 [address] SCAN    KNEE ARTHROSCOPY         Social History     Occupational History    Not on file   Tobacco Use    Smoking status: Never Smoker    Smokeless tobacco: Never Used   Substance and Sexual Activity    Alcohol use: Not Currently    Drug use: Not Currently    Sexual activity: Not on file        History reviewed. No pertinent family history. No outpatient medications have been marked as taking for the 11/30/21 encounter [facility] Encounter).         Current Facility-Administered Medications   Medication Dose Route Frequency Provider Last Rate Last Admin    dexamethasone (DECADRON) injection 10 mg  10 mg IntraVENous Q6H Juan C Dock Rupard, APRN - CNP        Or    dexamethasone (DECADRON) injection 4 mg  4 mg IntraVENous Q6H Juan C Dock Rupard, APRN - CNP        levETIRAcetam (KEPPRA) 1,000 mg in sodium chloride 0.9 % 100 mL IVPB  1,000 mg IntraVENous Q12H Juan C Dock Rupard, APRN -  mL/hr at 11/30/21 1647 1,000 mg at 11/30/21 1647     Current Outpatient Medications   Medication Sig Dispense Refill    rivaroxaban (XARELTO) 20 MG TABS tablet Take 1 tablet by mouth daily (with breakfast) 90 tablet 3    losartan (COZAAR) 100 MG tablet Take 100 mg by mouth daily      chlorthalidone (HYGROTON) 25 MG tablet TAKE 1 TABLET ON MONDAY. .. WEDNESDAY AND FRIDAY  3    amLODIPine (NORVASC) 5 MG tablet Take 1 tablet by mouth daily 30 tablet 3    Dextromethorphan-guaiFENesin (CORICIDIN HBP CONGESTION/COUGH)  MG CAPS Take 1 capsule by mouth every 6 hours as needed (Congestion, cough) 20 capsule 0        Objective:  /80   Pulse 81   Temp 98.4 °F (36.9 °C) (Oral)   Resp 30   Ht 6' 4\" (1.93 m)   Wt 220 lb (99.8 kg)   SpO2 100%   BMI 26.78 kg/m²     Physical Exam:   Patient seen and examined  GCS:  4 - Opens eyes on own  4 - Seems confused, disoriented  6 - Follows simple motor commands  General: Well developed. Alert and cooperative in no acute distress. HENT: atraumatic, neck supple  Eyes: Optic discs: Not tested  Pulmonary: unlabored respiratory effort  Cardiovascular:  Warm well perfused.  No peripheral edema  Gastrointestinal: abdomen soft, NT, ND    Neurological:  Mental Status: Awake, alert, oriented to self and situation, speech clear and appropriate  Attention: Intact  Language: No aphasia or dysarthria noted  Sensation: Intact to all extremities to light touch  Coordination: Intact    Cranial Nerves:  II: Visual acuity not tested, denies new visual changes / diplopia  III, IV, VI: PERRL, 3 mm bilaterally, EOMI, no nystagmus noted  V: Facial sensation intact bilaterally to touch  VII: Face symmetric  VIII: Hearing intact bilaterally to spoken voice  IX: Palate movement equal bilaterally  XI: Shoulder shrug equal bilaterally  XII: Tongue midline    Musculoskeletal:   Gait: Not tested   Assist devices: None   Tone: Normal  Motor strength:    Right  Left    Right  Left    Deltoid  5 5  Hip Flex  5 5   Biceps  5 5  Knee Extensors  5 5   Triceps  5 5  Knee Flexors  5 5   Wrist Ext  5 5  Ankle Dorsiflex. 5 5   Wrist Flex  5 5  Ankle Plantarflex. 5 5   Handgrip  5 5  Ext Trav Longus  5 5   Thumb Ext  5 5         Radiological Findings:  CT Head WO Contrast  Result Date: 11/30/2021  3.1 cm hemorrhagic mass in left occipital lobe with extensive surrounding vasogenic edema and local mass effect. Additional subcentimeter hypoattenuating foci in bilateral frontal and right temporal lobes. Imaging findings are concerning for metastasis. Recommended follow-up with contrast-enhanced MRI. Labs:  Recent Labs     11/30/21  1550   WBC 11.8*   HGB 7.6*   HCT 22.8*          Recent Labs     11/30/21  1550      K 3.6   CL 98*   CO2 22   BUN 16   CREATININE 1.1   GLUCOSE 94   CALCIUM 9.5       Recent Labs     11/30/21  1550   PROTIME 19.4*   INR 1.68*       Patient Active Problem List    Diagnosis Date Noted    Paroxysmal atrial fibrillation ([facility] [address] 01/17/2020    Essential hypertension 01/17/2020       Assessment:  Patient is a 46 y.o. male w/AMS, nausea and vomiting. CT Head revealed 3.1 cm hemorrhagic mass in left occipital lobe with extensive surrounding vasogenic edema, brain compression and local mass effect. Patient's spouse educated from [facility] to Neurosurgery\" book pp. 5-7, 25-27, 26-40    Plan:  1. No emergent neurosurgical intervention indicated  2. Neurologic exams frequency:  - ICU: Q1H until otherwise directed  3. For change in exam MUST contact neurosurgery team along with critical care or primary team  4.  Brain Mass:  - MRI Brain w wo to better evaluate mass  - Keppra 1000mg BID for seizure prophylaxis  5. Cerebral edema:  - Keep Na within normal limits  - Decadron 10 mg once, then 4 mg Q6H; PPI & SSI while on Decadron  - Keep HOB >30 degrees  - NO dextrose in IVF's or in IV drips  - If central venous access is needed please use subclavian vs femoral - No IJ as this can decrease venous return and worsen cerebral edema  6. Advance diet / activity per primary team  7. DVT Prophylaxis: SCD's & OK to start SQ Heparin, if medically indicated  8. Appreciate critical care team assistance in management  9. Thank you for consult. Will follow inpatient. Please call with any questions or decline in neurological status    DISPO: Remain inpatient from neurosurgery standpoint. Dispo timing to be determined by primary team once patient is medically stable for discharge. Patient was discussed and images reviewed with Dr. Devante Dia who agrees with above assessment and plan.      Electronically signed by: TITA Price CNP, APRN-CNP, 11/30/2021 4:50 PM  [phone]

## 2021-11-30 NOTE — ED PROVIDER NOTES
[address] [facility]          EM RESIDENT NOTE       Date of evaluation: 11/30/2021    Chief Complaint     Fall (Pt has been confused since Sunday. Per wife, pt fell Sunday and she is unsure if he hit his head. [facility] pt for lung ca. ) and Altered Mental Status      History of Present Illness     Veena Rico is a 46 y.o. male with history of hypertension, metastatic lung cancer presenting with fall and altered mental status. 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 She is unsure if there was associated head trauma she was not present. However, she reports that since this time patient has been confused and increasingly somnolent. He did report headache and nausea and had multiple episodes of emesis following his fall. Has been walking into other peoples homes and putting on clothing incorrectly. He has also seemed to have generalized weakness since this time. [facility] and received IV fluids and antiemetics without improvement in his symptoms. Wife contacted his primary care doctor today who recommended ED evaluation. He is awake and interactive on my initial interview but mostly responds to questions with \"mhmm\" or \"no\". He denies any headache, neck pain, chest pain, shortness of breath, cough, other recent sick symptoms, or other pain at this time. He does report feeling tired and weak. Patient is notably anticoagulated on Xarelto for unclear indication. Wife believes that he did take this this morning. Other than stated above, no additional aggravating or alleviating factors are noted. Review of Systems     A complete review of systems was performed and is negative except as otherwise noted in the HPI. Review of systems somewhat limited secondary to patient's mental status.      Past Medical, Surgical, Family, and Social History     He has a past medical history of Hypertension. He has a past surgical history that includes Knee arthroscopy; bronchoscopy (12/17/2020); bronchoscopy (12/17/2020); CT NEEDLE BIOPSY LIVER PERCUTANEOUS (6/4/2021); and CT NEEDLE BIOPSY LIVER PERCUTANEOUS (7/19/2021). His family history is not on file. He reports that he has never smoked. He has never used smokeless tobacco. He reports previous alcohol use. He reports previous drug use. Medications     Previous Medications    AMLODIPINE (NORVASC) 5 MG TABLET    Take 1 tablet by mouth daily    CHLORTHALIDONE (HYGROTON) 25 MG TABLET    TAKE 1 TABLET ON MONDAY. .. WEDNESDAY AND FRIDAY    DEXTROMETHORPHAN-GUAIFENESIN (CORICIDIN HBP CONGESTION/COUGH)  MG CAPS    Take 1 capsule by mouth every 6 hours as needed (Congestion, cough)    LORAZEPAM (ATIVAN) 1 MG TABLET        LOSARTAN (COZAAR) 100 MG TABLET    Take 100 mg by mouth daily    RIVAROXABAN (XARELTO) 20 MG TABS TABLET    Take 1 tablet by mouth daily (with breakfast)       Allergies     He is allergic to eggs or egg-derived products. Physical Exam     INITIAL VITALS: BP: 132/80, Temp: 98.4 °F (36.9 °C), Pulse: 81, Resp: 30, SpO2: 100 %   General:  Somnolent but non-toxic appearing. No acute distress  Eyes:  PERRL. No discharge from eyes. Extraocular movements intact. ENT:  No discharge from nose. OP clear  Neck:  Supple, trachea midline  Pulmonary:   Non-labored breathing. Breath sounds clear bilaterally  Cardiac:  Regular rate and rhythm. No murmurs  Abdomen:  Soft. Non-distended. Non-tender. Musculoskeletal:  No long bone deformity. Vascular:  Extremities warm and perfused. Normal pulses in all 4 extremities  Skin:  Dry, no rashes  Extremities:  No peripheral edema  Neuro: Alert. Moves all four extremities to command. Sensation grossly intact to light touch. Speech clear and appropriate and mentation slow but is able to speak and answer questions. No aphasia. Generalized weakness noted without focal weakness.  Oriented to self and situation. Diagnostic Results       RADIOLOGY:  CT Head WO Contrast   Final Result      3.1 cm hemorrhagic mass in left occipital lobe with extensive surrounding vasogenic edema and local mass effect. Additional subcentimeter hypoattenuating foci in bilateral frontal and right temporal lobes. Imaging findings are concerning for metastasis. Recommended follow-up with contrast-enhanced MRI.       MRI BRAIN W WO CONTRAST    (Results Pending)       LABS:   Results for orders placed or performed during the hospital encounter of 11/30/21   CBC Auto Differential   Result Value Ref Range    WBC 11.8 (H) 4.0 - 11.0 K/uL    RBC 2.20 (L) 4.20 - 5.90 M/uL    Hemoglobin 7.6 (L) 13.5 - 17.5 g/dL    Hematocrit 22.8 (L) 40.5 - 52.5 %    .9 (H) 80.0 - 100.0 fL    MCH 34.6 (H) 26.0 - 34.0 pg    MCHC 33.3 31.0 - 36.0 g/dL    RDW 18.6 (H) 12.4 - 15.4 %    Platelets 058 976 - 440 K/uL    MPV 7.4 5.0 - 10.5 fL    Neutrophils % 81.5 %    Lymphocytes % 9.0 %    Monocytes % 8.7 %    Eosinophils % 0.5 %    Basophils % 0.3 %    Neutrophils Absolute 9.7 (H) 1.7 - 7.7 K/uL    Lymphocytes Absolute 1.1 1.0 - 5.1 K/uL    Monocytes Absolute 1.0 0.0 - 1.3 K/uL    Eosinophils Absolute 0.1 0.0 - 0.6 K/uL    Basophils Absolute 0.0 0.0 - 0.2 K/uL   Basic Metabolic Panel w/ Reflex to MG   Result Value Ref Range    Sodium 136 136 - 145 mmol/L    Potassium reflex Magnesium 3.6 3.5 - 5.1 mmol/L    Chloride 98 (L) 99 - 110 mmol/L    CO2 22 21 - 32 mmol/L    Anion Gap 16 3 - 16    Glucose 94 70 - 99 mg/dL    BUN 16 7 - 20 mg/dL    CREATININE 1.1 0.9 - 1.3 mg/dL    GFR Non-African American >60 >60    GFR African American >60 >60    Calcium 9.5 8.3 - 10.6 mg/dL   Hepatic Function Panel   Result Value Ref Range    Total Protein 7.7 6.4 - 8.2 g/dL    Albumin 3.8 3.4 - 5.0 g/dL    Alkaline Phosphatase 155 (H) 40 - 129 U/L    ALT 98 (H) 10 - 40 U/L    AST 89 (H) 15 - 37 U/L    Total Bilirubin 2.0 (H) 0.0 - 1.0 mg/dL    Bilirubin, Direct 0.5 (H) 0.0 - 0.3 mg/dL    Bilirubin, Indirect 1.5 (H) 0.0 - 1.0 mg/dL   Urinalysis, reflex to microscopic   Result Value Ref Range    Color, UA Yellow Straw/Yellow    Clarity, UA Clear Clear    Glucose, Ur Negative Negative mg/dL    Bilirubin Urine MODERATE (A) Negative    Ketones, Urine 40 (A) Negative mg/dL    Specific Gravity, UA 1.015 1.005 - 1.030    Blood, Urine Negative Negative    pH, UA 6.5 5.0 - 8.0    Protein, UA TRACE (A) Negative mg/dL    Urobilinogen, Urine >=8.0 (A) <2.0 E.U./dL    Nitrite, Urine Negative Negative    Leukocyte Esterase, Urine Negative Negative    Microscopic Examination YES     Urine Type Other    Ammonia   Result Value Ref Range    Ammonia 13 (L) 16 - 60 umol/L   Protime-INR   Result Value Ref Range    Protime 19.4 (H) 9.9 - 12.7 sec    INR 1.68 (H) 0.88 - 1.12   Heparin Level/Anti-XA   Result Value Ref Range    Anti-XA Unfrac Heparin 1.09 (HH) 0.30 - 0.70 IU/mL   Microscopic Urinalysis   Result Value Ref Range    Hyaline Casts, UA 0-2 0 - 2 /LPF    Mucus, UA 4+ (A) None Seen /LPF    WBC, UA 3-5 0 - 5 /HPF    RBC, UA None seen 0 - 4 /HPF    Epithelial Cells, UA 2-5 0 - 5 /HPF    Bacteria, UA 4+ (A) None Seen /HPF    Amorphous, UA 1+ /HPF   POCT Glucose   Result Value Ref Range    POC Glucose 89 70 - 99 mg/dl    Performed on ACCU-CHEK        RECENT VITALS:  BP: 132/80, Temp: 98.4 °F (36.9 °C), Pulse: 81,Resp: 30, SpO2: 100 %     Procedures     ED Course     Nursing Notes, Past Medical Hx, Past Surgical Hx, Social Hx, Allergies, and Family Hx were reviewed.     The patient was given the followingmedications:  Orders Placed This Encounter   Medications    OR Linked Order Group     dexamethasone (DECADRON) injection 10 mg     dexamethasone (DECADRON) injection 4 mg    levETIRAcetam (KEPPRA) 1,000 mg in sodium chloride 0.9 % 100 mL IVPB    dexamethasone (DECADRON) injection 10 mg    prothrombin complex concentrate (human) (KCENTRA) infusion 4,902 Units    0.9 % sodium chloride infusion CONSULTS:  IP CONSULT TO NEUROSURGERY  IP CONSULT TO PRIMARY CARE PROVIDER  IP CONSULT TO ONCOLOGY  IP CONSULT TO CRITICAL CARE  IP CONSULT TO HOSPITALIST  IP CONSULT TO [address] [place] [address] / LEANDRO / Lyndsey Berg SARAI Mejia Vinton is a 46 y.o. male with a history and presentation as described above in HPI. The patient was evaluated by myself and the ED Attending Physician, Dr. Cinthia Good. All management and disposition plans were discussed and agreed upon. Upon presentation, the patient was somnolent but overall nontoxic appearing and had unremarkable vitals. Patient with history of metastatic lung cancer with known mets to the liver and ribs presenting with 3 days of altered mental status after a fall on anticoagulation. A stat CT noncontrast of the head was ordered and demonstrates 3.1 cm hemorrhagic mass in left occipital lobe with extensive surrounding vasogenic edema and local mass effect. Additional subcentimeter hypoattenuating foci in bilateral frontal and right temporal lobes. Imaging findings are concerning for metastasis. Patient and wife informed of findings. Neurosurgery was consulted and patient will receive 10 mg Decadron, 1 g Keppra at this time. Did discuss reversal of anticoagulation with attending neurosurgeon as patient's anti Xa level is elevated and he did recommend this. [facility] ordered for reversal at this time. Discussed with pharmacy. Per neurosurgery team, they will plan for operative intervention later this week. An MRI was ordered to further evaluate lesions in this context. Screening laboratory studies otherwise obtained and notable for mild leukocytosis with white count 11.8. Hemoglobin 7.6 from unclear recent baseline. INR is elevated to 1.68. Ammonia is not elevated at this time at 13. BMP was within normal limits. Creatinine appears at baseline.   Hepatic function panel with elevated transaminases, T bili elevated to 2, alk phos elevated to 155 likely in setting of patient's known liver metastases. Urinalysis with ketones but without clear evidence of infection. Patient to be admitted to ICU at this time for further management and monitoring. Discussed with hospitalist and intensivist for admission. Medications received during this ED visit:    Medications   dexamethasone (DECADRON) injection 10 mg (10 mg IntraVENous Not Given 11/30/21 1637)     Or   dexamethasone (DECADRON) injection 4 mg ( IntraVENous See Alternative 11/30/21 1637)   levETIRAcetam (KEPPRA) 1,000 mg in sodium chloride 0.9 % 100 mL IVPB (0 mg IntraVENous Stopped 11/30/21 1710)   prothrombin complex concentrate (human) (KCENTRA) infusion 4,902 Units (has no administration in time range)   0.9 % sodium chloride infusion (has no administration in time range)   dexamethasone (DECADRON) injection 10 mg (10 mg IntraVENous Given 11/30/21 1646)       Clinical Impression     1. Altered mental status, unspecified altered mental status type        Disposition     PATIENT REFERRED TO:  No follow-up provider specified. 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 The patient will continue to be monitored here in the emergency department until which time he is moved to his new treatment location.  Discussed with admitting team.           Roseanna Loja MD  11/30/21 7882

## 2021-11-30 NOTE — ED PROVIDER NOTES
ED Attending Attestation Note     Date of evaluation: 11/30/2021    This patient was seen by the resident. I have seen and examined the patient, agree with the workup, evaluation, management and diagnosis. The care plan has been discussed. I have reviewed the ECG and concur with the resident's interpretation. My assessment reveals history of metastatic carcinoma of the lung who presents with altered mental state. 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 She does not know what happened he had no evidence of head trauma but he had started acting funny on Monday. Went to [facility] and got IV fluids. Today it did worsen. Worries walking to other peoples homes. On exam he is somnolent but does awaken to verbal stimulation he has generalized weakness throughout there is no evidence of head trauma.   We will do metabolic work-up head CT and reassess     Cody Gaxiola MD  11/30/21 2320

## 2021-11-30 NOTE — LETTER
[facility]  [address] [address]  Phone: [phone]    December 1, 2021     Patient: Nicolás Hayes   YOB: 1970   Date of Visit: 11/30/2021       To Whom It May Concern:    Mr. Dio Love is currently a patient in the ICU at [facility] He has had stage IV lung cancer with metastases to the liver and bone which was originally diagnosed in 2020. He is currently in the hospital after suffering a fall and was found to have brain metastases. The cancer is refractory to his previous treatment with chemotherapy and radiation. The current treatment plan includes surgery and radiation. He is currently breathing on his own and is not on life support. His mental status is altered and he is not fully conscious. If you have any questions or concerns, please don't hesitate to call.     Sincerely,        Abigail Arias. [address]anc[address] Cl[address]  [phone]

## 2021-11-30 NOTE — LETTER
[facility] ICU  [address] [address]  Phone: [phone]    December 1, 2021     Patient: Glen Rodriguez   MR Number: [id number]   YOB: 1970   Date of Visit: 11/30/2021       To Whom It May Concern:    Mr. Skyler Taylor is currently a patient in the ICU at [facility] He was diagnosed with stage IV lung cancer in 2020. He is now admitted after suffering a fall, and has been found to have brain metastases, in addition to liver and bone metastases. The cancer has been refractory to previous treatment with chemotherapy and radiation. His current treatment plan includes surgery and radiation. At this time, he is breathing on his own and is not on life support. His mental status is altered and he is not fully conscious. If you have questions, please do not hesitate to call me.     Sincerely,        Abigail Molina. Nanci Claudio [facility]  [phone]

## 2021-12-01 ENCOUNTER — APPOINTMENT (OUTPATIENT)
Dept: CT IMAGING | Age: 51
DRG: 054 | End: 2021-12-01
Payer: COMMERCIAL

## 2021-12-01 ENCOUNTER — APPOINTMENT (OUTPATIENT)
Dept: MRI IMAGING | Age: 51
DRG: 054 | End: 2021-12-01
Payer: COMMERCIAL

## 2021-12-01 PROBLEM — C79.31 BRAIN METASTASES (HCC): Status: ACTIVE | Noted: 2021-12-01

## 2021-12-01 PROBLEM — C34.90 METASTATIC LUNG CANCER (METASTASIS FROM LUNG TO OTHER SITE) (HCC): Status: ACTIVE | Noted: 2021-12-01

## 2021-12-01 PROBLEM — I61.9 ICH (INTRACEREBRAL HEMORRHAGE) (HCC): Status: ACTIVE | Noted: 2021-12-01

## 2021-12-01 LAB
ANION GAP SERPL CALCULATED.3IONS-SCNC: 12 MMOL/L (ref 3–16)
BUN BLDV-MCNC: 17 MG/DL (ref 7–20)
CALCIUM SERPL-MCNC: 9.6 MG/DL (ref 8.3–10.6)
CHLORIDE BLD-SCNC: 102 MMOL/L (ref 99–110)
CO2: 23 MMOL/L (ref 21–32)
CREAT SERPL-MCNC: 1 MG/DL (ref 0.9–1.3)
FERRITIN: 3048 NG/ML (ref 30–400)
FOLATE: >20 NG/ML (ref 4.78–24.2)
GFR AFRICAN AMERICAN: >60
GFR NON-AFRICAN AMERICAN: >60
GLUCOSE BLD-MCNC: 100 MG/DL (ref 70–99)
GLUCOSE BLD-MCNC: 100 MG/DL (ref 70–99)
GLUCOSE BLD-MCNC: 115 MG/DL (ref 70–99)
GLUCOSE BLD-MCNC: 123 MG/DL (ref 70–99)
GLUCOSE BLD-MCNC: 128 MG/DL (ref 70–99)
HCT VFR BLD CALC: 22.3 % (ref 40.5–52.5)
HEMOGLOBIN: 7.6 G/DL (ref 13.5–17.5)
IRON SATURATION: 36 % (ref 20–50)
IRON: 73 UG/DL (ref 59–158)
MCH RBC QN AUTO: 34.5 PG (ref 26–34)
MCHC RBC AUTO-ENTMCNC: 34.2 G/DL (ref 31–36)
MCV RBC AUTO: 101 FL (ref 80–100)
PDW BLD-RTO: 18.1 % (ref 12.4–15.4)
PERFORMED ON: ABNORMAL
PLATELET # BLD: 267 K/UL (ref 135–450)
PMV BLD AUTO: 6.9 FL (ref 5–10.5)
POTASSIUM SERPL-SCNC: 4.9 MMOL/L (ref 3.5–5.1)
RBC # BLD: 2.21 M/UL (ref 4.2–5.9)
SODIUM BLD-SCNC: 137 MMOL/L (ref 136–145)
TOTAL IRON BINDING CAPACITY: 203 UG/DL (ref 260–445)
VITAMIN B-12: >2000 PG/ML (ref 211–911)
WBC # BLD: 13.8 K/UL (ref 4–11)

## 2021-12-01 PROCEDURE — 85027 COMPLETE CBC AUTOMATED: CPT

## 2021-12-01 PROCEDURE — 6360000002 HC RX W HCPCS: Performed by: NURSE PRACTITIONER

## 2021-12-01 PROCEDURE — 99221 1ST HOSP IP/OBS SF/LOW 40: CPT | Performed by: NURSE PRACTITIONER

## 2021-12-01 PROCEDURE — 6360000004 HC RX CONTRAST MEDICATION

## 2021-12-01 PROCEDURE — 6360000004 HC RX CONTRAST MEDICATION: Performed by: NURSE PRACTITIONER

## 2021-12-01 PROCEDURE — 36415 COLL VENOUS BLD VENIPUNCTURE: CPT

## 2021-12-01 PROCEDURE — 1200000000 HC SEMI PRIVATE

## 2021-12-01 PROCEDURE — 2580000003 HC RX 258: Performed by: STUDENT IN AN ORGANIZED HEALTH CARE EDUCATION/TRAINING PROGRAM

## 2021-12-01 PROCEDURE — 2000000000 HC ICU R&B

## 2021-12-01 PROCEDURE — 70450 CT HEAD/BRAIN W/O DYE: CPT

## 2021-12-01 PROCEDURE — 6360000002 HC RX W HCPCS

## 2021-12-01 PROCEDURE — 99222 1ST HOSP IP/OBS MODERATE 55: CPT | Performed by: INTERNAL MEDICINE

## 2021-12-01 PROCEDURE — 80048 BASIC METABOLIC PNL TOTAL CA: CPT

## 2021-12-01 PROCEDURE — A9576 INJ PROHANCE MULTIPACK: HCPCS | Performed by: NURSE PRACTITIONER

## 2021-12-01 PROCEDURE — 70553 MRI BRAIN STEM W/O & W/DYE: CPT

## 2021-12-01 PROCEDURE — 6360000002 HC RX W HCPCS: Performed by: STUDENT IN AN ORGANIZED HEALTH CARE EDUCATION/TRAINING PROGRAM

## 2021-12-01 PROCEDURE — 99223 1ST HOSP IP/OBS HIGH 75: CPT | Performed by: PSYCHIATRY & NEUROLOGY

## 2021-12-01 PROCEDURE — 94761 N-INVAS EAR/PLS OXIMETRY MLT: CPT

## 2021-12-01 PROCEDURE — C9113 INJ PANTOPRAZOLE SODIUM, VIA: HCPCS | Performed by: STUDENT IN AN ORGANIZED HEALTH CARE EDUCATION/TRAINING PROGRAM

## 2021-12-01 PROCEDURE — 70498 CT ANGIOGRAPHY NECK: CPT

## 2021-12-01 PROCEDURE — APPNB30 APP NON BILLABLE TIME 0-30 MINS

## 2021-12-01 RX ORDER — HALOPERIDOL 5 MG/ML
INJECTION INTRAMUSCULAR
Status: COMPLETED
Start: 2021-12-01 | End: 2021-12-01

## 2021-12-01 RX ORDER — NICOTINE POLACRILEX 4 MG
15 LOZENGE BUCCAL PRN
Status: DISCONTINUED | OUTPATIENT
Start: 2021-12-01 | End: 2021-12-03 | Stop reason: HOSPADM

## 2021-12-01 RX ORDER — HALOPERIDOL 5 MG/ML
10 INJECTION INTRAMUSCULAR ONCE
Status: DISCONTINUED | OUTPATIENT
Start: 2021-12-01 | End: 2021-12-01

## 2021-12-01 RX ORDER — HALOPERIDOL 5 MG/ML
5 INJECTION INTRAMUSCULAR
Status: COMPLETED | OUTPATIENT
Start: 2021-12-01 | End: 2021-12-01

## 2021-12-01 RX ORDER — PANTOPRAZOLE SODIUM 40 MG/10ML
40 INJECTION, POWDER, LYOPHILIZED, FOR SOLUTION INTRAVENOUS DAILY
Status: DISCONTINUED | OUTPATIENT
Start: 2021-12-01 | End: 2021-12-03

## 2021-12-01 RX ORDER — DIPHENHYDRAMINE HCL 25 MG
25 TABLET ORAL
Status: DISCONTINUED | OUTPATIENT
Start: 2021-12-01 | End: 2021-12-01

## 2021-12-01 RX ORDER — DIPHENHYDRAMINE HYDROCHLORIDE 50 MG/ML
INJECTION INTRAMUSCULAR; INTRAVENOUS
Status: COMPLETED
Start: 2021-12-01 | End: 2021-12-01

## 2021-12-01 RX ORDER — DEXTROSE MONOHYDRATE 25 G/50ML
12.5 INJECTION, SOLUTION INTRAVENOUS PRN
Status: DISCONTINUED | OUTPATIENT
Start: 2021-12-01 | End: 2021-12-03 | Stop reason: HOSPADM

## 2021-12-01 RX ORDER — INSULIN LISPRO 100 [IU]/ML
0-6 INJECTION, SOLUTION INTRAVENOUS; SUBCUTANEOUS
Status: DISCONTINUED | OUTPATIENT
Start: 2021-12-01 | End: 2021-12-03 | Stop reason: HOSPADM

## 2021-12-01 RX ORDER — DIPHENHYDRAMINE HYDROCHLORIDE 50 MG/ML
50 INJECTION INTRAMUSCULAR; INTRAVENOUS ONCE
Status: COMPLETED | OUTPATIENT
Start: 2021-12-01 | End: 2021-12-01

## 2021-12-01 RX ORDER — DEXTROSE MONOHYDRATE 50 MG/ML
100 INJECTION, SOLUTION INTRAVENOUS PRN
Status: DISCONTINUED | OUTPATIENT
Start: 2021-12-01 | End: 2021-12-03 | Stop reason: HOSPADM

## 2021-12-01 RX ORDER — INSULIN LISPRO 100 [IU]/ML
0-3 INJECTION, SOLUTION INTRAVENOUS; SUBCUTANEOUS NIGHTLY
Status: DISCONTINUED | OUTPATIENT
Start: 2021-12-01 | End: 2021-12-03 | Stop reason: HOSPADM

## 2021-12-01 RX ADMIN — DIPHENHYDRAMINE HYDROCHLORIDE 50 MG: 50 INJECTION, SOLUTION INTRAMUSCULAR; INTRAVENOUS at 00:32

## 2021-12-01 RX ADMIN — LEVETIRACETAM 1000 MG: 100 INJECTION, SOLUTION INTRAVENOUS at 17:00

## 2021-12-01 RX ADMIN — DEXAMETHASONE SODIUM PHOSPHATE 4 MG: 4 INJECTION, SOLUTION INTRAMUSCULAR; INTRAVENOUS at 04:17

## 2021-12-01 RX ADMIN — PANTOPRAZOLE SODIUM 40 MG: 40 INJECTION, POWDER, FOR SOLUTION INTRAVENOUS at 08:15

## 2021-12-01 RX ADMIN — IOPAMIDOL 80 ML: 755 INJECTION, SOLUTION INTRAVENOUS at 16:33

## 2021-12-01 RX ADMIN — DEXAMETHASONE SODIUM PHOSPHATE 4 MG: 4 INJECTION, SOLUTION INTRAMUSCULAR; INTRAVENOUS at 11:34

## 2021-12-01 RX ADMIN — DEXAMETHASONE SODIUM PHOSPHATE 4 MG: 4 INJECTION, SOLUTION INTRAMUSCULAR; INTRAVENOUS at 22:48

## 2021-12-01 RX ADMIN — HALOPERIDOL 5 MG: 5 INJECTION INTRAMUSCULAR at 00:30

## 2021-12-01 RX ADMIN — LORAZEPAM 2 MG: 2 INJECTION INTRAMUSCULAR at 00:34

## 2021-12-01 RX ADMIN — LEVETIRACETAM 1000 MG: 100 INJECTION, SOLUTION INTRAVENOUS at 04:19

## 2021-12-01 RX ADMIN — DIPHENHYDRAMINE HYDROCHLORIDE 50 MG: 50 INJECTION INTRAMUSCULAR; INTRAVENOUS at 00:32

## 2021-12-01 RX ADMIN — GADOTERIDOL 20 ML: 279.3 INJECTION, SOLUTION INTRAVENOUS at 00:50

## 2021-12-01 RX ADMIN — HALOPERIDOL LACTATE 5 MG: 5 INJECTION, SOLUTION INTRAMUSCULAR at 00:30

## 2021-12-01 RX ADMIN — DEXAMETHASONE SODIUM PHOSPHATE 4 MG: 4 INJECTION, SOLUTION INTRAMUSCULAR; INTRAVENOUS at 17:13

## 2021-12-01 RX ADMIN — LORAZEPAM 2 MG: 2 INJECTION INTRAMUSCULAR; INTRAVENOUS at 00:34

## 2021-12-01 NOTE — PROGRESS NOTES
An additional 2 mg Ativan, 5 mg Haldol, and 50 mg Tylenol needed down in MRI for patient restlessness/agitation.
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
## 2021-12-01 NOTE — CONSULTS
Neurology & Neurocritical Care Consult Note    Can Gold TITA requesting this consult. CC: Brain mets  Reason for Consult: Multiple infarcts seen on MRI  HPI:   Cali Goodwin is a 46 y.o. 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 Patient is drowsy, not responding, history obtained per chart review. Patient presented on 11/30 with altered mental status. 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 He was acting strange on Monday and went to [facility] and got IV fluids. His mental status worsened (reportedly walking into other people's homes and dressing incorrectly) and he had a headache and multiple episodes of emesis after his fall. He came to the hospital and his CT head showed a hemorrhagic mass in L occipital lobe. He was seen by Neurosurgery and started on decadron and keppra. He was also on xarelto which was reversed in the ER. Follow up MRI showed at least 25 enhancing lesions as well as \"multiple tiny diffusion restriction abnormalities without associated enhancing lesion compatible with small infarcts. Additional larger infarcts suspected in the posterior right temporal and lateral right occipital region and in the medial right temporal lobe\". On my exam, he is lethargic, but he did require multiple sedating medications to complete his MRI. PAST MEDICAL HISTORY:   has a past medical history of Hypertension.   Patient Active Problem List   Diagnosis    Paroxysmal atrial fibrillation (HCC)    Essential hypertension    Brain mass       SURGICAL HISTORY:  Past Surgical History:   Procedure Laterality Date    BRONCHOSCOPY  12/17/2020    BRONCHOSCOPY W/EBUS FNA performed by Ambika Cabrera MD at [address] [place] [address]  12/17/2020    BRONCHOSCOPY/ENDOBRONCHIAL LUNG BIOPSY performed by Ambika Cabrera MD at [facility] LIVER PERCUTANEOUS  6/4/2021    CT NEEDLE BIOPSY LIVER PERCUTANEOUS 6/4/2021 [facility] CT SCAN    CT NEEDLE BIOPSY LIVER PERCUTANEOUS  7/19/2021    CT NEEDLE BIOPSY LIVER PERCUTANEOUS 7/19/2021 [facility] CT SCAN    KNEE ARTHROSCOPY         FAMILY HISTORY:  family history is not on file. SOCIAL HISTORY:  he reports that he has never smoked. He has never used smokeless tobacco. He reports previous alcohol use. He reports previous drug use. Social History     Socioeconomic History    Marital status:      Spouse name: Not on file    Number of children: Not on file    Years of education: Not on file    Highest education level: Not on file   Occupational History    Not on file   Tobacco Use    Smoking status: Never Smoker    Smokeless tobacco: Never Used   Substance and Sexual Activity    Alcohol use: Not Currently    Drug use: Not Currently    Sexual activity: Not on file   Other Topics Concern    Not on file   Social History Narrative    Not on file     Social Determinants of Health     Financial Resource Strain:     Difficulty of Paying Living Expenses: Not on file   Food Insecurity:     Worried About Running Out of Food in the Last Year: Not on file    Fadi of Food in the Last Year: Not on file   Transportation Needs:     Lack of Transportation (Medical): Not on file    Lack of Transportation (Non-Medical):  Not on file   Physical Activity:     Days of Exercise per Week: Not on file    Minutes of Exercise per Session: Not on file   Stress:     Feeling of Stress : Not on file   Social Connections:     Frequency of Communication with Friends and Family: Not on file    Frequency of Social Gatherings with Friends and Family: Not on file    Attends Mandaen Services: Not on file    Active Member of Clubs or Organizations: Not on file    Attends Club or Organization Meetings: Not on file    Marital Status: Not on file   Intimate Partner Violence:     Fear of Current or Ex-Partner: Not on file    Emotionally Abused: Not on file    Physically Abused: Not on file    Sexually Abused: Not on file   Housing Stability:     Unable to Pay for Housing in the Last Year: Not on file    Number of Frank in the Last Year: Not on file    Unstable Housing in the Last Year: Not on file       HOME MEDICATIONS:  Medications Prior to Admission: rivaroxaban (XARELTO) 20 MG TABS tablet, Take 1 tablet by mouth daily (with breakfast)  LORazepam (ATIVAN) 1 MG tablet,   losartan (COZAAR) 100 MG tablet, Take 100 mg by mouth daily  chlorthalidone (HYGROTON) 25 MG tablet, TAKE 1 TABLET ON MONDAY. .. WEDNESDAY AND FRIDAY  amLODIPine (NORVASC) 5 MG tablet, Take 1 tablet by mouth daily  Dextromethorphan-guaiFENesin (CORICIDIN HBP CONGESTION/COUGH)  MG CAPS, Take 1 capsule by mouth every 6 hours as needed (Congestion, cough)    CURRENT MEDICATIONS:    Current Facility-Administered Medications:     pantoprazole (PROTONIX) injection 40 mg, 40 mg, IntraVENous, Daily, Mile Cordero MD, 40 mg at 12/01/21 0815    insulin lispro (1 Unit Dial) 0-6 Units, 0-6 Units, SubCUTAneous, TID WC, Mile Cordero MD    insulin lispro (1 Unit Dial) 0-3 Units, 0-3 Units, SubCUTAneous, Nightly, Mile Cordero MD    glucose (GLUTOSE) 40 % oral gel 15 g, 15 g, Oral, PRN, Mile Cordero MD    dextrose 50 % IV solution, 12.5 g, IntraVENous, PRN, Mile Cordero MD    glucagon (rDNA) injection 1 mg, 1 mg, IntraMUSCular, PRN, Mile Cordero MD    dextrose 5 % solution, 100 mL/hr, IntraVENous, PRN, Mile Cordero MD    levETIRAcetam (KEPPRA) 1,000 mg in sodium chloride 0.9 % 100 mL IVPB, 1,000 mg, IntraVENous, Q12H, Gigi Garcia MD, Stopped at 12/01/21 0434    acetaminophen (TYLENOL) tablet 650 mg, 650 mg, Oral, Q4H PRN, Gigi Garcia MD    ondansetron (ZOFRAN-ODT) disintegrating tablet 4 mg, 4 mg, Oral, Q8H PRN **OR** ondansetron (ZOFRAN) injection 4 mg, 4 mg, IntraVENous, Q6H PRN, Gigi Garcia MD    losartan (COZAAR) tablet 100 mg, 100 mg, Oral, Daily, Nicky Rossi MD    amLODIPine (NORVASC) tablet 5 mg, 5 mg, Oral, Daily, Nicky Rossi MD    [DISCONTINUED] dexamethasone (DECADRON) injection 10 mg, 10 mg, IntraVENous, Q6H **OR** dexamethasone (DECADRON) injection 4 mg, 4 mg, IntraVENous, Q6H, Fabricio Sawyer, APRN - CNP, 4 mg at 12/01/21 1134    labetalol (NORMODYNE;TRANDATE) injection 10 mg, 10 mg, IntraVENous, Q6H PRN, Nicky Rossi MD    hydrALAZINE (APRESOLINE) injection 10 mg, 10 mg, IntraVENous, Q6H PRN, Nicky Rossi MD      ROS:   Patient drowsy after medication administration overnight, not responding to questions at this time.     Constitutional  /67   Pulse 86   Temp 98.2 °F (36.8 °C) (Oral)   Resp 16   Ht 6' 4\" (1.93 m)   Wt 216 lb 4.3 oz (98.1 kg)   SpO2 100%   BMI 26.33 kg/m²     General Drowsy, no distress, well-nourished  Cardiovascular: Rate and rhythm regular  Respiratory: Unlabored respiratory pattern    Neurologic  Mental status:   Orientation does not respond to orientation questions   Attention  Does not attend to exam    Language does not attempt to answer questions   Comprehension does not follow commands    Cranial nerves:   CN2: Blinks to visual threat bilaterally  CN 3,4,6: pupils equal and reactive to light, extraocular muscles intact, squeezes eyes shut when trying to examine them  CN5: ANITRA d/t encephalopathy  CN7:face symmetric at rest  CN8: hearing appears symmetric to voice  CN9: Exam limited d/t encephalopathy  CN11: Exam limited d/t encephalopathy  CN12: Exam limited d/t encephalopathy    Motor Exam:  Seen moving his arms across bed, withdrawals to pain in all four extremities      Sensory: grimaces to pain in all four extremities  Cerebellar/coordination: exam limited d/t encephalopathy  Tone: normal in all 4 extremities  Gait: held for patient safety      Images:  CT head wo contrast:   Impression       3.1 cm hemorrhagic mass in left occipital lobe with extensive surrounding vasogenic edema and local mass effect. Additional subcentimeter hypoattenuating foci in bilateral frontal and right temporal lobes. Imaging findings are concerning for metastasis. MRI wo contrast:   Impression   1. At least 25 enhancing lesions compatible with extensive intracranial metastatic disease. Many lesions are very small and subtle. Large hemorrhagic lesion in the left occipital lobe with enhancement compatible with hemorrhagic metastasis. 2. Multiple tiny diffusion restriction abnormalities without associated enhancing lesion compatible with multiple small infarcts. Additional larger infarcts suspected in the posterior right temporal and lateral right occipital region and in the medial    right temporal lobe. 3. Vasogenic edema associated with the left occipital lobe lesion with mild mass effect upon the posterior horn of the left lateral ventricle. No evidence of hydrocephalus or significant mass effect otherwise. Assessment:  Jasmina Maharaj is a 46 y.o. 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 He had fallen on Sunday and was acting strange after the fall, had trouble dressing appropriately and was walking into other people's houses. CT in the ER revealed a L occipital lobe mass and follow up MRI demonstrated at least 25 enhancing lesions compatible with metastatic disease as well as areas of diffusion restriction compatible with small infarcts. Will complete stroke work up with vessel imaging, echo.     Plan:  - Continue Keppra 1000 mg BID  - ECHO with bubble study  - CTA Head and Neck  - Hgb A1c & lipid panel  - Telemetry while inpatient  - Decadron per neurosurgery  - Neurosurgery following, appreciate recs  - Oncology and rad onc following, appreciate recs    TITA Bond-CNP  Neurology & Neurocritical Care   Neurology Line: [phone]  [employer]: [facility] Critical [facility] NPs

## 2021-12-01 NOTE — PROGRESS NOTES
Pt admitted to 4515 from ED. Pt very restless-pulling at gown and leads. Pt oriented to self place and time but disoriented to situation. Pt able to follow commands in all extremities. Very impulsive. Will occasionally try to get out of bed. Responsive to redirection. Pupils equal round and reactive. Speech is clear. Denies pain, nausea, or SOB at this time. Plan to go to MRI tonight. Q1h neuro checks. Will cont to monitor closely.

## 2021-12-01 NOTE — PROGRESS NOTES
NEUROSURGERY PROGRESS NOTE    12/1/2021 9:21 AM                               Jaelyn Mai                      LOS: 1 day     Subjective: Patient laying in bed upon entering the room. No acute events overnight. Patient remains lethargic but arouses to voice. Spouse at bedside. Physical Exam:  Patient seen and examined    Vitals:    12/01/21 0700   BP:    Pulse:    Resp:    Temp: 97.6 °F (36.4 °C)   SpO2:      GCS:  4 - Opens eyes on own  4 - Seems confused, disoriented  6 - Follows simple motor commands  General: Well developed. Alert and cooperative in no acute distress.     HENT: atraumatic, neck supple  Eyes: Optic discs: Not tested  Pulmonary: unlabored respiratory effort  Cardiovascular:  Warm well perfused. No peripheral edema  Gastrointestinal: abdomen soft, NT, ND     Neurological:  Mental Status: Awake, alert, oriented to self and situation, speech clear and appropriate  Attention: Intact  Language: No aphasia or dysarthria noted  Sensation: Intact to all extremities to light touch  Coordination: Intact     Cranial Nerves:  II: Visual acuity not tested, denies new visual changes / diplopia  III, IV, VI: PERRL, 3 mm bilaterally, EOMI, no nystagmus noted  V: Facial sensation intact bilaterally to touch  VII: Face symmetric  VIII: Hearing intact bilaterally to spoken voice  IX: Palate movement equal bilaterally  XI: Shoulder shrug equal bilaterally  XII: Tongue midline     Musculoskeletal:   Gait: Not tested   Assist devices: None   Tone: Normal  Motor strength:     Right  Left      Right  Left    Deltoid  5 5   Hip Flex  5 5   Biceps  5 5   Knee Extensors  5 5   Triceps  5 5   Knee Flexors  5 5   Wrist Ext  5 5   Ankle Dorsiflex. 5 5   Wrist Flex  5 5   Ankle Plantarflex.   5 5   Handgrip  5 5   Ext Trav Longus  5 5   Thumb Ext  5 5              Radiological Findings:  CT Head WO Contrast  Result Date: 11/30/2021  3.1 cm hemorrhagic mass in left occipital lobe with extensive surrounding vasogenic edema and local mass effect. Additional subcentimeter hypoattenuating foci in bilateral frontal and right temporal lobes. Imaging findings are concerning for metastasis. Recommended follow-up with contrast-enhanced MRI. MRI BRAIN W WO CONTRAST  Result Date: 12/1/2021  1. At least 25 enhancing lesions compatible with extensive intracranial metastatic disease. Many lesions are very small and subtle. Large hemorrhagic lesion in the left occipital lobe with enhancement compatible with hemorrhagic metastasis. 2. Multiple tiny diffusion restriction abnormalities without associated enhancing lesion compatible with multiple small infarcts. Additional larger infarcts suspected in the posterior right temporal and lateral right occipital region and in the medial right temporal lobe. 3. Vasogenic edema associated with the left occipital lobe lesion with mild mass effect upon the posterior horn of the left lateral ventricle. No evidence of hydrocephalus or significant mass effect otherwise. Labs:  Recent Labs     12/01/21  0335   WBC 13.8*   HGB 7.6*   HCT 22.3*          Recent Labs     12/01/21  0335      K 4.9      CO2 23   BUN 17   CREATININE 1.0   GLUCOSE 128*   CALCIUM 9.6       Recent Labs     11/30/21  1550   PROTIME 19.4*   INR 1.68*       Patient Active Problem List    Diagnosis Date Noted    Brain mass 11/30/2021    Paroxysmal atrial fibrillation ([facility] [address] 01/17/2020    Essential hypertension 01/17/2020     Assessment:  Patient is a 46 y.o. male w/AMS, nausea and vomiting. CT Head revealed 3.1 cm hemorrhagic mass in left occipital lobe with extensive surrounding vasogenic edema, brain compression and local mass effect. Patient's spouse educated from 6Scan to Neurosurgery\" book pp. 5-7, 25-27, 26-40     Plan:  1. Neurologic exams frequency: Q4H  2. For change in exam MUST contact neurosurgery team along with critical care or primary team  3.  Brain Mass:  - MRI Brain shows numerous mets  - Keppra 1000mg BID for seizure prophylaxis  - Radiation Oncology consulted, appreciate recs  - Oncology consulted, appreciate recs and prognosis  - Plan for surgical resection this weekend  4. Cerebral edema:  - Keep Na within normal limits  - Decadron 4 mg Q6H; PPI & SSI while on Decadron  - Keep HOB >30 degrees  - NO dextrose in IVF's or in IV drips  - If central venous access is needed please use subclavian vs femoral - No IJ as this can decrease venous return and worsen cerebral edema  5. Stroke:  - MRI Brain revealed multiple infarcts with larger infarcts suspected in the posterior right temporal and lateral right occipital region and in the medial right temporal lobe. - Neurology consulted, appreciate recs  6. Advance diet / activity per primary team  7. DVT Prophylaxis: SCD's & OK to start SQ Heparin, if medically indicated  8. Appreciate critical care team assistance in management  9. Will follow inpatient.  Please call with any questions or decline in neurological status     DISPO: Remain inpatient from neurosurgery standpoint. Dispo timing to be determined by primary team once patient is medically stable for discharge.     Patient was seen and examined with Dr. Alma Metcalf who agrees with above assessment and plan.      Electronically signed by: TITA Askew CNP, [place], 12/1/2021 9:21 AM  [phone]

## 2021-12-01 NOTE — PROGRESS NOTES
Occupational Therapy/ Physical Therapy  No Evaluation  Orders received, and wife meeting with palliative care team during attempted eval. Will follow up on 12/2. Popeye Kline.  Jan, OTR/L [id number] [facility]

## 2021-12-01 NOTE — CONSULTS
[facility]  Consult Note      Requesting Physician: Emi Rangel MD    CHIEF COMPLAINT:  Gait disturbance    HISTORY OF PRESENT ILLNESS:  Mr. Bette Langley  is a 46 y.o. male we are seeing in consultation for metastatic lung adenocarcinoma. He is a lifelong non-smoker. His lung cancer history is documented in the note below. For this hospitalization, he presented with gait disturbance and fall. He was directed to the emergency room for further evaluation and underwent a head CT that demonstrated a 3.1 cm hemorrhagic mass in the left occipital lobe with accompanying vasogenic edema and mass-effect. There were additional foci of metastasis in both frontal lobes and the right temporal lobe. He underwent an MRI of the brain That demonstrated multiple enhancing lesions. There were also evidence of multiple small infarcts. There was vasogenic edema associated with the dominant mass in the left occipital lobe. He is being evaluated by neurosurgery for possible resection of the left occipital lobe dominant mass. Medical oncology is consulted for further recommendations.     Past Medical History:        Diagnosis Date    Hypertension        Past Surgical History:        Procedure Laterality Date    BRONCHOSCOPY  2020    BRONCHOSCOPY W/EBUS FNA performed by Ambika Cabrera MD at [address] [place] [address]  2020    BRONCHOSCOPY/ENDOBRONCHIAL LUNG BIOPSY performed by Ambika Cabrera MD at [facility] CT NEEDLE BIOPSY LIVER PERCUTANEOUS  2021    CT NEEDLE BIOPSY LIVER PERCUTANEOUS 2021 [address] SCAN    CT NEEDLE BIOPSY LIVER PERCUTANEOUS  2021    CT NEEDLE BIOPSY LIVER PERCUTANEOUS 2021 [address] SCAN    KNEE ARTHROSCOPY         Oncology History (Recent Office Note)  Patient Name: Kelly Chapa  Patient : 1970  Patient MRN: [id number]    Primary Oncologist: Joceline Young (Radiation Oncology), Tevin Pearson  Referring Physician: Harmony Guidry    Date of Service: 11/18/2021    Chief Complaint  Non-small cell lung cancer (disorder) ( Stage Date: 12/18/2020, Stage IVB (Primary, Left upper lobe, bronchus or lung, T2a, N2, M1c)-Pathological  Date of Dx:12/18/2020 Extent of Disease: Evidence of metastatic disease; Disease State: Initial diagnosis; Metastatic Sites: Adrenal gland, left; Metastatic Sites: Bone; MET gene mutation: Not detected; PD-L1: 1-49% Expression; RET gene mutation: RET fusion negative; ROS1 Gene: Negative; BRAF Mutation: Wild-type; Histopathologic Type: Adenocarcinoma; Histologic Grade: G2; EGFR Expression: Positive-EGFR sensitizing mutation; ALK (FISH): Negative; TRK gene: Negative;)      Problem List  Non-small cell lung cancer (disorder) ( Stage Date: 12/18/2020, Stage IVB (Primary, Left upper lobe, bronchus or lung, T2a, N2, M1c)-Pathological  Date of Dx:12/18/2020 Extent of Disease: Evidence of metastatic disease; Disease State: Initial diagnosis; Metastatic Sites: Adrenal gland, left; Metastatic Sites: Bone; MET gene mutation: Not detected; PD-L1: 1-49% Expression; RET gene mutation: RET fusion negative; ROS1 Gene: Negative; BRAF Mutation: Wild-type; Histopathologic Type: Adenocarcinoma; Histologic Grade: G2; EGFR Expression: Positive-EGFR sensitizing mutation; ALK (FISH): Negative; TRK gene: Negative;)  Chronic atrial fibrillation  Effects of immunotherapy  Essential hypertension  High risk drug monitoring status (finding)  Insomnia  Secondary malignant neoplasm of adrenal gland (disorder)  Secondary malignant neoplasm of bone  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 This prompted referral to pulmonology, Dr. Efe Mayberry.   He underwent bronchoscopy with biopsy of both the mass and a sub-carinal lymph node. This yielded a diagnosis of pulmonary adenocarcinoma. A PET/CT confirmed the hypermetabolic left upper lobe mass with hypermetabolic adenopathy in both the left hilum and mediastinum. There was a hypermetabolic left adrenal mass as well as hypermetabolism in the left second rib and the posterior right 11th rib. There was also focal hypermetabolism within the prostate. Previous Therapies  Bronchoscopy with biopsy 2020  Palliative radiation therapy to right 12th rib and left upper lobe lung, complete 2021  Osimertinib as primary metastatic therapy. 2021-present    Current Therapy  Pembrolizumab + Pemetrexed + Carboplatin Q21D Cycle Length: 21 Number Cycles: 8 Start: C1D1 on 2021 Assoc Dx: Non-small cell lung cancer (disorder) LOT: 2nd Line Metastatic 10/28/2021 C1 D1  Pembrolizumab IV, 200 mg  Pemetrexed IV, 1230 mg (500 mg/m2)  Carboplatin IV,  mg  Cyanocobalamin IM,  mcg  Cyanocobalamin IM, 1000 mcg  Dexamethasone Oral, 4 mg bid  OHC Hydration Cycle Length: 1 Number Cycles: 1 Start: Leonides Akhtar on 2021 Assoc Dx: Non-small cell lung cancer (disorder) LOT: Toxicity Management 2021 C1 D1  Sodium Chloride IV 0.9 %,  mL, Dose modified  Cycle 4 of carboplatin/pemetrexed/pembrolizumab delivered 2021    Interval History  Follow-up scans were performed on November 15, 2021. There was evidence of progression in the liver and the intrathoracic lymph nodes. Review of Systems  Constitutional:  No weight loss, No fever, No chills, No night sweats. Energy level good.   Eyes:  No impairment or change in vision  ENT / Mouth:  No pain, abnormal ulceration, bleeding, nasal drip or change in voice or hearing  Cardiovascular:  No chest pain, palpitations, new edema, or calf discomfort  Respiratory:  No pain, hemoptysis, change to breathing  Breast:  No pain, discharge, change in appearance or texture  Gastrointestinal:  No pain, cramping, jaundice, change to eating and bowel habits  Urinary:  No pain, bleeding or change in continence  Genitalia: No pain, bleeding or discharge  Musculoskeletal:  No redness, pain, edema or weakness  Skin:  No pruritus, rash, change to nodules or lesions  Neurologic:  No discomfort, change in mental status, speech, sensory or motor activity  Psychiatric:  No change in concentration or change to affect or mood  Endocrine:  No hot flashes, increased thirst, or change to urine production  Hematologic: No petechiae, ecchymosis or bleeding  Lymphatic:  No lymphadenopathy or lymphedema  Allergy / Immunologic:  No eczema, hives, frequent or recurrent infections      Vital Signs  Blood pressure: 100/64, R arm, Regular, Pulse: 88, Temperature: 97.9 F, Respirations: 16, O2 sat: , Pain Scale: 0, Height: 77 in, Weight: 235.4 lb, BSA: 2.41, BMI: 27.91 kg/m2    Physical Exam    CONSTITUTIONAL: awake, alert, cooperative, no apparent distress   EYES: pupils equal, round and reactive to light, sclera clear and conjunctiva normal  ENT: Normocephalic, without obvious abnormality, atraumatic  NECK: supple, symmetrical, no jugular venous distension and no carotid bruits   HEMATOLOGIC/LYMPHATIC: no cervical, supraclavicular or axillary lymphadenopathy   LUNGS: no increased work of breathing and clear to auscultation; Approximate 10 cm soft fleshy mass medial suprascapular region on the right  CARDIOVASCULAR: irregular rate and rhythm, normal S1 and S2, no murmur noted  ABDOMEN: normal bowel sounds x 4, soft, non-distended, non-tender, no masses palpated, no hepatosplenomegaly   MUSCULOSKELETAL: full range of motion noted, tone is normal  NEUROLOGIC: awake, alert, oriented to name, place and time. Motor skills grossly intact. SKIN: Normal skin color, texture, turgor and no jaundice.  appears intact   EXTREMITIES: Without cyanosis, clubbing, edema or asymmetry      Labs  CBC  Lab Results 11/18/2021 11/02/2021 10/28/2021 10/07/2021 09/16/2021 08/26/2021    CBC         WBC x 10^3/uL 9.2 (H) 6.1 5.3 4.3 4.0 (L) 4.8      RBC x 10^6/uL 2.83 (L) 3.35 (L) 3.16 (L) 3.64 (L) 3.92 (L) 4.57 (L)      HGB g/dL 9.4 (L) 10.8 (L) 10.1 (L) 11.3 (L) 11.9 (L) 13.5 (L)      HCT % 28.3 (L) 32.2 (L) 30.2 (L) 33.7 (L) 35.2 (L) 39.9 (L)      MCV fL 100.0 (H) 96.1 (H) 95.6 (H) 92.6 (H) 89.8 87.3      MCH pg 33.2 (H) 32.2 32.0 31.0 30.4 29.5      MCHC g/dL 33.2 33.5 33.4 33.5 33.8 33.8      RDW-CV, % 17.6 (H) 16.2 (H) 16.9 (H) 15.2 (H) 13.8 13.4      PLT x 10^3/uL 183.0 187.0 228.0 216.0 235.0 165.0      Venancio % 75.8 (H) 80.7 (H) 72.5 (H) 57.9 67.0 62.9      LY % 9.9 (L) 14.6 (L) 17.4 (L) 26.1 20.4 (L) 21.0 (L)      MO % 13.9 (H) 3.8 (L) 9.0 13.2 (H) 10.6 7.5      EO % 0.2 (L) 0.7 (L) 0.7 (L) 2.1 1.5 8.2 (H)      BA % 0.2 0.2 0.4 0.7 0.5 0.4      Venancio # (ANC) x 10^3/uL 6.95 (H) 4.91 3.87 2.46 2.66 3.00      LY # x 10^3/uL 0.91 (L) 0.89 (L) 0.93 (L) 1.11 (L) 0.81 (L) 1.00 (L)      MO # x 10^3/uL 1.28 (H) 0.23 (L) 0.48 0.56 0.42 0.36      EO # x 10^3/uL 0.02 (L) 0.04 0.04 0.09 0.06 0.39      BA # x 10^3/uL 0.02 0.01 0.02 0.03 0.02 0.02  CMP  Lab Results 11/18/2021 11/02/2021 10/28/2021 10/07/2021 09/16/2021 08/26/2021    Chemistries         Glucose mg/dL   106 105 109 (H) 84 144 (H)      BUN mg/dL   25 (H) 19 15 11 15      Creatinine mg/dL   1.30 (H) 1.24 (H) 1.10 1.14 (H) 1.27 (H)      BUN/Creatinine ratio   19.2 15.3 13.6 9.6 11.8      Sodium mmol/L   132 (L) 136 141 141 140      Potassium mmol/L   4.2 3.8 4.0 4.3 3.8      Chloride mmol/L   98 103 105 108 (H) 105      CO2 mmol/L   26.6 26.7 29.2 29.1 27.7      Anion gap, mmol/L   7.4 6.3 6.8 3.9 (L) 7.3      Calcium mg/dL   9.7 9.5 9.7 10.0 9.4      Albumin g/dL   3.9 3.9 3.7 3.8 3.9      Total protein g/dL   7.7 7.7 7.9 7.7 7.6      A/G ratio   1.0 1.0 0.9 (L) 1.0 (L) 1.1      Bilirubin, total mg/dL   0.8 0.8 0.8 0.9 1.0      Alkaline phosphatase U/L   99 86 74 61 61 AST/SGOT U/L   64 (H) 39 (H) 35 (H) 28 29      ALT/SGPT U/L   72 (H) 31 32 23 25      GFR non-African American, estimated mL/min/1.73m2   58.2 (L) >60.0 >60.0 >60.0 59.8 (L)      GFR African American, estimated mL/min/1.73m2   >60.0 >60.0 >60.0 >60.0 >60.0      Cortisol, total, serum ug/dL   10.35         Imaging  August 25, 2021  CT of chest abdomen and pelvis with contrast     HISTORY: Squamous cell carcinoma with metastasis  COMPARISON: 7/2/2021  CONTRAST:  Oral and 80 mL Isovue-370 intravenous    TECHNIQUE: Individualized dose optimization technique was used in order to meet ALARA standards for radiation dose reduction. In addition to vendor specific dose reduction algorithms, the dose reduction techniques vary based on the specific scanner   utilized but frequently include automated exposure control, adjustment of the mA and/or kV according to patient size, and use of iterative reconstruction technique. COMMENTS:      Chest: Mild degenerative changes in the spine. Small sclerotic lesion anterior T11 and T12 vertebral bodies without change. Sclerotic metastasis in the left second anterior rib again noted. Stable perifissural 4 mm right middle lobe nodule image 3-58. A   few additional small stable pulmonary nodules are also noted. 7 x 6 mm nodule right lower lobe image 3-63 previously measured 5 x 4 mm. 3 mm nodule right lower lobe image 3-83 previously measured 2 mm. New 4 mm nodule left lower lobe image 3-54. Atelectasis/scarring and traction bronchiectasis again noted in the left upper lobe without change and compatible with radiation changes. Abdomen and pelvis: Stable sclerotic lesion in the left posterior iliac bone image 3-172. Bowel is unremarkable. No lymphadenopathy. Gallbladder is within normal limits. Diffuse enlargement of the left adrenal gland suggesting hyperplasia is without   change. Right adrenal gland, kidneys, spleen, and pancreas are unremarkable.  There is a mild increase in size of several of the hypodense hepatic metastases. For example, 2.3 x 2.2 cm lesion in the right lobe on image 3-114 previously measured 1.9 x 1.6   cm. Bladder and prostate are unremarkable. Impression     Mild increase in size of 2 subcentimeter pulmonary nodules, with a new left lower lobe nodule. Multiple additional nodules are stable. Continued follow-up is indicated. Mild progression of size of hepatic metastases. Stable small osseous carotid foci as well as discrete metastatic lesion in the left second rib. November 15, 2021  CT chest abdomen and pelvis with IV  HISTORY: Left upper lobe carcinoma  Scans from thoracic inlet to diaphragm and from diaphragm to pubic symphysis with oral and IV contrast as 80 mL Isovue-370. Comparison 8/25/2021. Up-to-date CT equipment with radiation dose reduction techniques. Chest-  Stable airspace disease with traction bronchiectasis in the left upper lobe, consistent with post irradiation change. No discrete mass in this region to indicate recurrent neoplasm. Suspected intrathoracic sathish metastases include-  Pretracheal-retrocaval node 1.1 x 1.9 cm (previously 0.4 x 0.8 cm), -series 601 image 42. AP window node 1.2 x 1.6 cm , new, image 38. Subcarinal lymph node 1.3 x 2.5 cm (previously 0.8 x 2.0 cm), image 47  Axillae and supraclavicular fossae unremarkable. Pulmonary nodules include-  6 mm nodule right lower lobe is stable, image 59  3 mm nodule right middle lobe, stable image 55  New 6 mm nodule right lower lobe image 73  8 mm subpleural nodule left lower lobe (previously 4 mm), image 51  6 mm nodule left upper lobe (previously 4 mm), image 35  No suspicious osseous lesion. Thoracic aorta normal size. Central pulmonary arteries patent. Abdomen and pelvis-  Increase in size of multiple hepatic metastases. Index lesion in the right hepatic lobe measures 3.7 x 3.1 cm, image 98 (previously 1 x 1 cm.   Another index lesion is located in structures. Therefore, the original bronchoscopic pathology specimen was sent for Caris molecular profiling on January 8, 2021. This molecular profiling demonstrated an activating EGFR mutation; EGFR-L858R. Reassuringly, the PSA was within normal limits. A brain MRI was negative for metastases. We began osimertinib in February 2021. Follow-up scans in May 2021 demonstrate evidence of progressive disease. This is somewhat puzzling as his pathology from his diagnostic biopsy at presentation demonstrated and EGFR L858R mutation. As a result, I will request another biopsy of a metastatic site. May 2021 Films reviewed with Dr. Marco Katz of radiology who feels that the left adrenal mass is not amenable to biopsy. He also feels that the liver nodule will be difficult to access percutaneously. We are therefore left in the situation where we need to check another scan in a relatively short interval.  A 6-week follow-up scan in July 2021 demonstrated progression of liver nodules. I am therefore going to request biopsy of 1 of these nodules with molecular profiling. My impression is that we are dealing with 2 processes. The EGFR L858R process is responding to osimertinib as is demonstrated by regressive disease in the lungs. The puzzling aspect of his imaging is the growing nodules in the liver. I suspect that this is a different process and that is why I requested a new biopsy. Biopsy confirmed recurrent metastatic lung adenocarcinoma. Repeat Caris profiling on the liver specimen Redemonstrated the EGFR L858R mutation. So I think we have to conclude that there is a subclone in the liver that is not responding to osimertinib. We therefore needed to change treatments. We switched to carboplatin/pemetrexed/pembrolizumab. He completed 4 cycles in October 2021. Follow-up scans showed further progression. At this point, I am puzzled by the clinical behavior of his cancer.   He has not responded to targeted therapy (Referring)  JOSE CABRERA MD    Electronically signed by Casey Martinez.  Don VERA, PhD 11/18/2021 12:04 EST    Current Medications:  Current Facility-Administered Medications: pantoprazole (PROTONIX) injection 40 mg, 40 mg, IntraVENous, Daily  insulin lispro (1 Unit Dial) 0-6 Units, 0-6 Units, SubCUTAneous, TID WC  insulin lispro (1 Unit Dial) 0-3 Units, 0-3 Units, SubCUTAneous, Nightly  glucose (GLUTOSE) 40 % oral gel 15 g, 15 g, Oral, PRN  dextrose 50 % IV solution, 12.5 g, IntraVENous, PRN  glucagon (rDNA) injection 1 mg, 1 mg, IntraMUSCular, PRN  dextrose 5 % solution, 100 mL/hr, IntraVENous, PRN  levETIRAcetam (KEPPRA) 1,000 mg in sodium chloride 0.9 % 100 mL IVPB, 1,000 mg, IntraVENous, Q12H  acetaminophen (TYLENOL) tablet 650 mg, 650 mg, Oral, Q4H PRN  ondansetron (ZOFRAN-ODT) disintegrating tablet 4 mg, 4 mg, Oral, Q8H PRN **OR** ondansetron (ZOFRAN) injection 4 mg, 4 mg, IntraVENous, Q6H PRN  losartan (COZAAR) tablet 100 mg, 100 mg, Oral, Daily  amLODIPine (NORVASC) tablet 5 mg, 5 mg, Oral, Daily  [DISCONTINUED] dexamethasone (DECADRON) injection 10 mg, 10 mg, IntraVENous, Q6H **OR** dexamethasone (DECADRON) injection 4 mg, 4 mg, IntraVENous, Q6H  labetalol (NORMODYNE;TRANDATE) injection 10 mg, 10 mg, IntraVENous, Q6H PRN  hydrALAZINE (APRESOLINE) injection 10 mg, 10 mg, IntraVENous, Q6H PRN    Allergies:  Eggs or egg-derived products    Social History:   Social History     Socioeconomic History    Marital status:      Spouse name: Not on file    Number of children: Not on file    Years of education: Not on file    Highest education level: Not on file   Occupational History    Not on file   Tobacco Use    Smoking status: Never Smoker    Smokeless tobacco: Never Used   Substance and Sexual Activity    Alcohol use: Not Currently    Drug use: Not Currently    Sexual activity: Not on file   Other Topics Concern    Not on file   Social History Narrative    Not on file     Social Determinants of Health     Financial Resource Strain:     Difficulty of Paying Living Expenses: Not on file   Food Insecurity:     Worried About Running Out of Food in the Last Year: Not on file    Fadi of Food in the Last Year: Not on file   Transportation Needs:     Lack of Transportation (Medical): Not on file    Lack of Transportation (Non-Medical): Not on file   Physical Activity:     Days of Exercise per Week: Not on file    Minutes of Exercise per Session: Not on file   Stress:     Feeling of Stress : Not on file   Social Connections:     Frequency of Communication with Friends and Family: Not on file    Frequency of Social Gatherings with Friends and Family: Not on file    Attends Congregational Services: Not on file    Active Member of [address] [place] [address] or Organizations: Not on file    Attends Club or Organization Meetings: Not on file    Marital Status: Not on file   Intimate Partner Violence:     Fear of Current or Ex-Partner: Not on file    Emotionally Abused: Not on file    Physically Abused: Not on file    Sexually Abused: Not on file   Housing Stability:     Unable to Pay for Housing in the Last Year: Not on file    Number of [place] in the Last Year: Not on file    Unstable Housing in the Last Year: Not on file       Family History:     History reviewed. No pertinent family history. REVIEW OF SYSTEMS:    Review of Systems   Unable to perform ROS: Mental status change        PHYSICAL EXAM:      Vitals:  /67   Pulse 86   Temp 96.6 °F (35.9 °C) (Axillary)   Resp 16   Ht 6' 4\" (1.93 m)   Wt 216 lb 4.3 oz (98.1 kg)   SpO2 99%   BMI 26.33 kg/m²     No intake or output data in the 24 hours ending 12/01/21 0726    Physical Exam  Eyes:      General: No scleral icterus. Cardiovascular:      Rate and Rhythm: Rhythm irregular. Heart sounds: No murmur heard. No gallop. Pulmonary:      Effort: Pulmonary effort is normal.      Breath sounds: Normal breath sounds. No wheezing or rales. Abdominal:      Palpations: Abdomen is soft. Tenderness: There is no abdominal tenderness. Musculoskeletal:         General: No swelling. Lymphadenopathy:      Cervical: No cervical adenopathy. Skin:     General: Skin is warm and dry. DATA:  Recent Labs     12/01/21  0335 11/30/21  1550   WBC 13.8* 11.8*   NEUTROABS  --  9.7*   LYMPHOPCT  --  9.0   RBC 2.21* 2.20*   HGB 7.6* 7.6*   HCT 22.3* 22.8*   .0* 103.9*   MCH 34.5* 34.6*   MCHC 34.2 33.3   RDW 18.1* 18.6*    276       Lab Results   Component Value Date     12/01/2021    K 4.9 12/01/2021     12/01/2021    CO2 23 12/01/2021    GLUCOSE 128 (H) 12/01/2021    BUN 17 12/01/2021    CREATININE 1.0 12/01/2021    LABGLOM >60 12/01/2021    GFRAA >60 12/01/2021    CALCIUM 9.6 12/01/2021    PROT 7.7 11/30/2021    LABALBU 3.8 11/30/2021    AGRATIO 1.6 10/18/2019    BILITOT 2.0 (H) 11/30/2021    ALKPHOS 155 (H) 11/30/2021    ALT 98 (H) 11/30/2021    AST 89 (H) 11/30/2021    GLOB 2.9 10/18/2019       Lab Results   Component Value Date    PROT 7.7 11/30/2021    PROT 7.4 10/18/2019    INR 1.68 (H) 11/30/2021    INR 1.01 07/19/2021    INR 1.01 06/04/2021     Lab Results   Component Value Date    APTT 33.4 07/19/2021    APTT 32.5 06/04/2021       TUMOR MARKERS:   Lab Results   Component Value Date    PSA 0.81 12/20/2019         Radiology Review:  CT Head WO Contrast    Result Date: 11/30/2021  EXAM: CT HEAD WO CONTRAST INDICATION: fall, AMS, anticoagulation; COMPARISON: MRI dated 2/10/2021. TECHNICAL: Axial CT imaging obtained from vertex to skull base. Axial images and multiplanar reformatted images reviewed. Up-to-date CT equipment and radiation dose reduction techniques were employed. IV Contrast: None. FINDINGS: There is a 3.1 cm hemorrhagic mass in the left occipital lobe with extensive surrounding vasogenic edema and local mass effect. Partial effacement of left lateral ventricle on.  There are additional subcentimeter hypoattenuating foci in bilateral frontal lobes and right temporal lobe. Punctate hyperdense focus in left frontal lobe (image 44). There is no hydrocephalus. Basal cisterns are patent. Calvarium is intact. Visualized paranasal sinuses and mastoid air cells are clear. 3.1 cm hemorrhagic mass in left occipital lobe with extensive surrounding vasogenic edema and local mass effect. Additional subcentimeter hypoattenuating foci in bilateral frontal and right temporal lobes. Imaging findings are concerning for metastasis. Recommended follow-up with contrast-enhanced MRI. CT CHEST ABDOMEN PELVIS W CONTRAST    Result Date: 11/15/2021  CT chest abdomen and pelvis with IV HISTORY: Left upper lobe carcinoma Scans from thoracic inlet to diaphragm and from diaphragm to pubic symphysis with oral and IV contrast as 80 mL Isovue-370. Comparison 8/25/2021. Up-to-date CT equipment with radiation dose reduction techniques. Chest- Stable airspace disease with traction bronchiectasis in the left upper lobe, consistent with post irradiation change. No discrete mass in this region to indicate recurrent neoplasm. Suspected intrathoracic sathish metastases include- Pretracheal-retrocaval node 1.1 x 1.9 cm (previously 0.4 x 0.8 cm), -series 601 image 42. AP window node 1.2 x 1.6 cm , new, image 38. Subcarinal lymph node  1.3 x 2.5 cm (previously 0.8 x 2.0 cm), image 47 Axillae and supraclavicular fossae unremarkable. Pulmonary nodules include- 6 mm nodule right lower lobe is stable, image 59 3 mm nodule right middle lobe, stable image 55 New 6 mm nodule right lower lobe image 73 8 mm subpleural nodule left lower lobe (previously 4 mm), image 51 6 mm nodule left upper lobe (previously 4 mm), image 35 No suspicious osseous lesion. Thoracic aorta normal size. Central pulmonary arteries patent. Abdomen and pelvis- Increase in size of multiple hepatic metastases.  Index lesion in the right hepatic lobe measures 3.7 x 3.1 cm, image 98 (previously 1 x 1 cm. Another index lesion is located in the inferior right hepatic lobe measuring 4.3 x 4.6 cm, image 120 (previously 1.7 x 1.8 cm). Spleen, pancreas and gallbladder are unremarkable. Left adrenal mass measures 4.4 x 2.5 cm, relatively stable. Right adrenal normal. Both kidneys normal. Large and small bowel loops of normal caliber. No pneumoperitoneum or ascites. No abdominal or pelvic lymphadenopathy. Urinary bladder normal. Mild prostatomegaly. No suspicious osteolytic or osteoblastic lesion     Stable post irradiation changes left upper lobe Progression of metastatic disease with at least 3 enlarging or new intrathoracic nodes, interval development of a couple new pulmonary metastases and significant increase in size and number of multiple hepatic metastases Left adrenal metastasis is stable. MRI BRAIN W WO CONTRAST    Result Date: 12/1/2021  EXAM: MRI BRAIN W WO CONTRAST INDICATION:Brain met abnormal head CT. Left upper lung cancer. COMPARISON: Head CT 11/30/2021 and MRI the brain 2/10/2021. TECHNIQUE: Multiplanar, multisequence MR imaging of the head obtained with and without IV contrast. IV contrast: 20 mL FINDINGS: SINUSES AND OSSEOUS STRUCTURES: The mastoid air cells and middle ears are clear. The paranasal sinuses are clear. Marrow signal is unremarkable. ORBITS: Unremarkable MIDLINE STRUCTURES: The sella turcica and pituitary gland are normal. Craniovertebral alignment and cerebellar tonsils are normal. VENTRICLES: Normal size and configuration for patient age. Cisternal spaces are intact. INTRACRANIAL HEMORRHAGE: Large intraparenchymal hemorrhage in the left occipital region associated with enhancement compatible with a hemorrhagic metastasis. No additional intracranial hemorrhage identified. BRAIN PARENCHYMA: Multiple enhancing lesions compatible with metastatic disease. Lesions as follows: *  6 mm ring-enhancing lesion right superior frontal region axial image 155 of series 13.  * 14 x 13 mm ring-enhancing lesion axial image 146 of series 13. Mild adjacent edema. *  Tiny ring-enhancing lesion anterior right frontal region image 147 measures approximately 4 mm. *  2 adjacent lesions in the anterior right frontal white matter axial image 133 and 134. The larger lesion measures approximately 9 mm and the smaller lesion measures approximately 7 mm. *  Tiny posterior right frontal white matter lesion image 133. *  4 mm enhancing lesion anterior frontal lobe image 126. *  Tiny enhancing lesion right occipital lobe image 98 measuring approximately 4 mm in size. *  4 mm enhancing lesion axial image 65 in the right occipital region laterally. *  4 mm enhancing lesion inferior lateral right occipital lobe image 54. *  Tiny 3 mm subtle enhancing lesion adjacent to the posterior horn of the ventricle in the right parietal white matter axial image 120. *  Tiny 3 mm enhancing lesion right occipital lobe image 67. *  Tiny 3 mm enhancing lesion in the superior cerebellar vermis image 64. *  5 mm ring-enhancing lesion anterior inferior left cerebellum image 42. *  5 mm ring-enhancing lesion inferior lateral left cerebellum image 28. *  5 mm ring-enhancing lesion left parietal region image 139. *  3 mm enhancing lesion anterior left frontal region image 136. *  4 mm ring-enhancing lesion left frontal lobe image 137. *  Ring-enhancing lesion measuring 16 mm in the anterior left frontal region image 114. *  4 mm enhancing lesion left frontal lobe image 127. *  4 mm ring-enhancing lesion left temporal lobe image 69. *  6 mm enhancing lesion left occipital lobe image 70. *  4 mm ring-enhancing lesion left parietal white matter image 107. *  4 mm ring-enhancing lesion medial left parietal lobe image 111. *  Large enhancing hemorrhagic lesion left occipital lobe abutting the tentorium and falx measuring approximately 32 x 29 x 32 mm.  Diffusion weighted images demonstrate numerous punctate diffusion signal abnormalities some of which are associated with enhancing lesions compatible with metastatic disease. Others demonstrate no underlying enhancing lesion and suggests small embolic infarcts. There is a focal signal abnormality in the posterior right temporal and lateral right occipital region which demonstrates diffusion restriction, FLAIR signal abnormality and no definite enhancing lesions suspicious for small infarct. Similar finding in the medial right temporal lobe diffusion-weighted image 40. There is mild mass effect and moderate vasogenic edema associated with the left occipital hemorrhagic metastasis. No significant mass effect otherwise identified. No midline shift. No evidence of hydrocephalus. 1. At least 25 enhancing lesions compatible with extensive intracranial metastatic disease. Many lesions are very small and subtle. Large hemorrhagic lesion in the left occipital lobe with enhancement compatible with hemorrhagic metastasis. 2. Multiple tiny diffusion restriction abnormalities without associated enhancing lesion compatible with multiple small infarcts. Additional larger infarcts suspected in the posterior right temporal and lateral right occipital region and in the medial right temporal lobe. 3. Vasogenic edema associated with the left occipital lobe lesion with mild mass effect upon the posterior horn of the left lateral ventricle. No evidence of hydrocephalus or significant mass effect otherwise. Problem List  Patient Active Problem List   Diagnosis    Paroxysmal atrial fibrillation (HCC)    Essential hypertension    Brain mass       IMPRESSION/RECOMMENDATIONS:  Metastatic lung adenocarcinoma  - Left upper lobe primary  - Metastases involving bone, liver, brain  - Lifelong non-smoker  - Caris Molecular profiling demonstrating EGFR L858R mutation. Additional pathogenic mutations in MYD 88, PIK3CA, TP53, and a MET Variant of Unknown Significance.   - Primary refractory to treatment with osimertinib  - Primary refractory to treatment with combination chemo-immunotherapy utilizing carboplatin/Alimta/pembrolizumab. - Evidence of progression in the liver and lungs by CT November 15, 2021. - New brain metastases as above. - From a medical oncology perspective, systemic treatment options include single agent chemotherapy such as Taxotere, Gemzar, Navelbine vs clinical trial participation vs best supportive care. I have sent his Caris molecular profile report to Dr. Pricilla Sifuentes at the drug development unit at [facility] to determine if he is an eligible candidate for a phase 1 clinical trial. 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 D/W Dr Lexus Mccoy    D/W his wife Senia Rockwell by phone. Thank you for the consultation.     Delia White MD  12/1/2021  7:26 AM

## 2021-12-01 NOTE — PROGRESS NOTES
As Per night shift nurse, patient was given 3mg of ativan, 10mg of haloperidol, and 50mg of benadryl prior to MRI to combat agitation and restlessness. Upon morning shift assessment, patient was difficult to arouse, did follow commands in all 4 extremities, but couldn't maintain eye opening during neuro check. He was lethargic and the only verbal response given was his name. My noon assessment showed improvement in alertness. Patient was able alert to person, and place. Able to sustain eye opening during assessment, and  strength was stronger. Patients partner has been at the bedside since 9am and has been updated by each of the care teams. States that she understands the next steps but would like to speak with Palliative Care and Case Management. Both have been consulted.

## 2021-12-01 NOTE — PROGRESS NOTES
Clinical Pharmacy Progress Note    All IV in NS - Management by Pharmacy    Consult Date(s): 11/30  Consulting Provider(s): Milli Bennett    Assessment / Plan    Hemorrhagic Brain Mass  All IVs in Normal Saline   Drips will be adjusted to normal saline as appropriate based on compatibility, in an effort to avoid fluid shifts, as D5W is osmotically active.  The following intermittent IV drips / infusions have been adjusted to saline:  o None at this time   The following medications must remain in D5W due to incompatibility with normal saline:  o Amiodarone Infusion  o Amphotericin  o Mycophenolate  o Nitroprusside  o Penicillin G Potassium   Note: Patient has D5W ordered as part of hypoglycemia orderset.  Total IV fluid delivered to patient over last 24 hrs: TBD tomorrow   [facility] will follow daily to ensure all IVPBs / drips are in NS where possible. Thank you for consulting Pharmacy!     Marvin Guan, PharmD  PGY-1 Pharmacy Resident  [id number]  11/30/2021 9:18 PM

## 2021-12-01 NOTE — PROGRESS NOTES
Hospitalist Progress Note      PCP: Courtney Garza MD    Date of Admission: 11/30/2021    Chief Complaint: Altered mental status    Hospital Course: 45 yo male with PMH afib on xarelto and stage IVb pulmonary adenocarcinoma who presented with AMS after fall subsequently found to have hemorrhagic brain metastases. Subjective: Patient seen in the ICU. Noted to be drowsy and sleepy. Moving all extremities    Medications:  Reviewed    Infusion Medications    dextrose       Scheduled Medications    pantoprazole  40 mg IntraVENous Daily    insulin lispro  0-6 Units SubCUTAneous TID WC    insulin lispro  0-3 Units SubCUTAneous Nightly    levetiracetam  1,000 mg IntraVENous Q12H    losartan  100 mg Oral Daily    amLODIPine  5 mg Oral Daily    dexamethasone  4 mg IntraVENous Q6H     PRN Meds: glucose, dextrose, glucagon (rDNA), dextrose, acetaminophen, ondansetron **OR** ondansetron, labetalol, hydrALAZINE    No intake or output data in the 24 hours ending 12/01/21 1114    Physical Exam Performed:    /67   Pulse 86   Temp 97.6 °F (36.4 °C) (Oral)   Resp 16   Ht 6' 4\" (1.93 m)   Wt 216 lb 4.3 oz (98.1 kg)   SpO2 99%   BMI 26.33 kg/m²     General appearance: No acute distress, laying with eyes closed, not responding. Somnolent  HEENT: Pupils equal, round, and reactive to light. Conjunctivae/corneas clear. Neck: Supple, with full range of motion. No jugular venous distention. Trachea midline. Respiratory:  Normal respiratory effort. Clear to auscultation, bilaterally without Rales/Wheezes/Rhonchi. Cardiovascular: Regular rate and rhythm with normal S1/S2 without murmurs, rubs or gallops. Abdomen: Soft, non-tender, non-distended with normal bowel sounds. Musculoskeletal: No clubbing, cyanosis or edema bilaterally. Full range of motion without deformity. Skin: Skin color, texture, turgor normal.  No rashes or lesions.   Neurologic: Moving all extremities  Psychiatric: Somnolent  Capillary Refill: Brisk,3 seconds, normal   Peripheral Pulses: +2 palpable, equal bilaterally       Labs:   Recent Labs     11/30/21  1550 12/01/21  0335   WBC 11.8* 13.8*   HGB 7.6* 7.6*   HCT 22.8* 22.3*    267     Recent Labs     11/30/21  1550 12/01/21  0335    137   K 3.6 4.9   CL 98* 102   CO2 22 23   BUN 16 17   CREATININE 1.1 1.0   CALCIUM 9.5 9.6     Recent Labs     11/30/21  1550   AST 89*   ALT 98*   BILIDIR 0.5*   BILITOT 2.0*   ALKPHOS 155*     Recent Labs     11/30/21  1550   INR 1.68*     No results for input(s): Breana Lenz in the last 72 hours. Urinalysis:      Lab Results   Component Value Date    NITRU Negative 11/30/2021    WBCUA 3-5 11/30/2021    BACTERIA 4+ 11/30/2021    RBCUA None seen 11/30/2021    BLOODU Negative 11/30/2021    SPECGRAV 1.015 11/30/2021    GLUCOSEU Negative 11/30/2021       Radiology:  MRI BRAIN W WO CONTRAST   Final Result   1. At least 25 enhancing lesions compatible with extensive intracranial metastatic disease. Many lesions are very small and subtle. Large hemorrhagic lesion in the left occipital lobe with enhancement compatible with hemorrhagic metastasis. 2. Multiple tiny diffusion restriction abnormalities without associated enhancing lesion compatible with multiple small infarcts. Additional larger infarcts suspected in the posterior right temporal and lateral right occipital region and in the medial    right temporal lobe. 3. Vasogenic edema associated with the left occipital lobe lesion with mild mass effect upon the posterior horn of the left lateral ventricle. No evidence of hydrocephalus or significant mass effect otherwise. CT Head WO Contrast   Final Result      3.1 cm hemorrhagic mass in left occipital lobe with extensive surrounding vasogenic edema and local mass effect. Additional subcentimeter hypoattenuating foci in bilateral frontal and right temporal lobes. Imaging findings are concerning for metastasis.       Recommended follow-up with contrast-enhanced MRI.               Assessment/Plan:    Active Hospital Problems    Diagnosis     Brain mass [G93.89]      Acute encephalopathy  Left occipital lobe hemorrhagic mass  Multiple brain mets  Vasogenic edema  Neurosurgery following  Continue neurochecks  Continue Decadron  Keep head of the bed elevated  Keppra 1000 mg twice daily for seizure prophylaxis  MRI of the brain with at least 25 enhancing lesions compatible with extensive intracranial metastatic disease  Palliative care has been consulted    Metastatic lung cancer  Oncology following  Patient refractory to treatment with primary combination chemoimmunotherapy  Plan to enroll in single agent chemotherapy versus clinical trial participation as per oncology    Hypertension  Continue losartan, amlodipine    Paroxysmal atrial fibrillation  Xarelto on hold given hemorrhagic mets    Anemia  Continue to monitor    DVT Prophylaxis: SCD  Diet: Diet NPO  Code Status: Full Code    PT/OT Eval Status: Pending    Dispo -pending clinical course    Mamadou Leo MD

## 2021-12-01 NOTE — PROGRESS NOTES
Speech Language Pathology  HOLD- attempt x 2    Chart reviewed, d/w RN. Pt had medication and is too lethargic at this time. RN to page if pt more alert later this date. Magalys Pérez M.S./CCC-SLP #[id number]  Pg. # [id number]      Addendum:  D/w Rn who reports pt starting to become slightly more responsive, however not yet appropriate for PO trials. Will follow up as pt appropriate, most likely 12/2  Magalys Pérez M.S./CCC-SLP #[id number]  Pg.  # [phone] 1:35 PM

## 2021-12-01 NOTE — PROGRESS NOTES
4 Eyes Admission Assessment     I agree as the admission nurse that 2 RN's have performed a thorough Head to Toe Skin Assessment on the patient. ALL assessment sites listed below have been assessed on admission. Areas assessed by both nurses:   [x]   Head, Face, and Ears   [x]   Shoulders, Back, and Chest  [x]   Arms, Elbows, and Hands   [x]   Coccyx, Sacrum, and Ischium  [x]   Legs, Feet, and Heels        Does the Patient have Skin Breakdown?   No         Ricky Prevention initiated:  Yes   Wound Care Orders initiated:  No      [facility] nurse consulted for Pressure Injury (Stage 3,4, Unstageable, DTI, NWPT, and Complex wounds) or Ricky score 18 or lower:  No      Nurse 1 eSignature: Electronically signed by Herminia Cohen RN on 12/1/21 at 2:55 AM EST    Nurse 2 eSignature: Electronically signed by Gina Davis RN on 12/1/21 at 5:30 AM EST

## 2021-12-01 NOTE — PLAN OF CARE
Problem: Falls - Risk of:  Goal: Will remain free from falls  Description: Will remain free from falls  Outcome: Ongoing  Note: Will assist patient with ambulation as needed if patient is up from bed to ensure risk of falling is diminished. Problem: Skin Integrity:  Goal: Will show no infection signs and symptoms  Description: Will show no infection signs and symptoms  Outcome: Ongoing  Note: Will monitor skin for signs of breakdown while patient is on bedrest. Q 2 hour turns will continue as well.

## 2021-12-01 NOTE — CONSULTS
Internal Medicine  PGY 1  History & Physical      CC AMS, confusion after fall     History Obtained From:  EMR and wife     HISTORY OF PRESENT ILLNESS:  Mr. Mirian Suazo is a 47 yo male with PMH afib on xarelto and stage IVb pulmonary adenocarcinoma who presented with AMS after fall subsequently found to have hemorrhagic brain metastases. Patient has had increased AMS and weakness after fall on Sunday. Apparently he has been going into other people's homes and has been dressing himself incorrectly. Patient was diagnosed with pulmonary adenocarcinoma in 2020 that has metastasized despite treatment at [facility]. He is currently on pembrolizumab, pemetrexed, carboplatin. Cancer has metastasized to adrenal glands, liver, lungs and now brain.      CT head today demonstrates a 3.1 cm hemorrhagic mass in L occipital lobe with extensive surrounding vasogenic edema and local mass effect with additional subcentimeter hypoattenuating foci in bilateral frontal and right temporal lobes concerning for metastases. Of note, patient had brain MRI in February 2021 which was normal.      Lab workup remarkable for abnormal LFTs, anemia with Hgb 7.6 and elevated PT/INR (takes Xarelto). Marjo Greening was administered in ED.      Patient with wife at bedside. She is from [facility] and tearful. The patient is a former .      Past Medical History:        Diagnosis Date    Hypertension    ·     Past Surgical History:        Procedure Laterality Date    BRONCHOSCOPY  12/17/2020    BRONCHOSCOPY W/EBUS FNA performed by Chelsi Flaherty MD at [address] [place] [address]  12/17/2020    BRONCHOSCOPY/ENDOBRONCHIAL LUNG BIOPSY performed by Chelsi Flaherty MD at [facility] LIVER PERCUTANEOUS  6/4/2021    CT NEEDLE BIOPSY LIVER PERCUTANEOUS 6/4/2021 [facility] CT SCAN    CT NEEDLE BIOPSY LIVER PERCUTANEOUS  7/19/2021    CT NEEDLE BIOPSY LIVER PERCUTANEOUS 7/19/2021 [facility] CT SCAN    KNEE ARTHROSCOPY     ·     Medications Priorto Admission:    · Medications Prior to Admission: rivaroxaban (XARELTO) 20 MG TABS tablet, Take 1 tablet by mouth daily (with breakfast)  · LORazepam (ATIVAN) 1 MG tablet,   · losartan (COZAAR) 100 MG tablet, Take 100 mg by mouth daily  · chlorthalidone (HYGROTON) 25 MG tablet, TAKE 1 TABLET ON MONDAY. .. WEDNESDAY AND FRIDAY  · amLODIPine (NORVASC) 5 MG tablet, Take 1 tablet by mouth daily  · Dextromethorphan-guaiFENesin (CORICIDIN HBP CONGESTION/COUGH)  MG CAPS, Take 1 capsule by mouth every 6 hours as needed (Congestion, cough)    Allergies:  Eggs or egg-derived products    Social History:   · TOBACCO:   reports that he has never smoked. He has never used smokeless tobacco.  · ETOH:   reports previous alcohol use. · DRUGS : None  · Patient currently lives with wife at home   ·   Family History:   · History reviewed. No pertinent family history. Review of Systems    ROS: A 10 point review of systems was conducted, significant findings as noted in HPI.     Physical Exam     Vitals:    12/01/21 0300   BP: 108/68   Pulse: 76   Resp: 15   Temp:    SpO2: 100%   Const: well-developed male lying in bed with eyes closed, not responding to questions, somnolent  Head: NCAT   Eyes: PERRLA, EOMI   Neck: supple, no thyromegaly, no LAD   Cardiac: RRR, no m/r/g  Pulm: CTAB   Abdo: soft, nontender, nondistended  Skin: no lesions, no rash  Ext: no peripheral edema, peripheral pulses intact   Neuro: Patient oriented to self on my exam, does not cooperate which limited neuro exam, somnolent but does follow commands, moves all 4 extremities, no aphasia, no facial droop    DATA:    Labs:  CBC:   Recent Labs     11/30/21  1550   WBC 11.8*   HGB 7.6*   HCT 22.8*          BMP:   Recent Labs     11/30/21  1550      K 3.6   CL 98*   CO2 22   BUN 16   CREATININE 1.1   GLUCOSE 94     LFT's:   Recent Labs     11/30/21  1550   AST 89*   ALT 98*   BILITOT 2.0*   ALKPHOS 155*     Troponin: No results for input(s): TROPONINI in the last 72 hours. BNP:No results for input(s): BNP in the last 72 hours. ABGs: No results for input(s): PHART, [id number], PO2ART in the last 72 hours. INR:   Recent Labs     11/30/21  1550   INR 1.68*       U/A:  Recent Labs     11/30/21  1550   COLORU Yellow   PHUR 6.5   WBCUA 3-5   RBCUA None seen   MUCUS 4+*   BACTERIA 4+*   CLARITYU Clear   SPECGRAV 1.015   LEUKOCYTESUR Negative   UROBILINOGEN >=8.0*   BILIRUBINUR MODERATE*   BLOODU Negative   GLUCOSEU Negative   AMORPHOUS 1+       CT Head WO Contrast   Final Result      3.1 cm hemorrhagic mass in left occipital lobe with extensive surrounding vasogenic edema and local mass effect. Additional subcentimeter hypoattenuating foci in bilateral frontal and right temporal lobes. Imaging findings are concerning for metastasis. Recommended follow-up with contrast-enhanced MRI. MRI BRAIN W WO CONTRAST    (Results Pending)           ASSESSMENT AND PLAN:  Mr. Ben Arzola is a 47 yo male with PMH afib on Xarelto and stage IVb pulmonary adenocarcinoma who presented with AMS after fall subsequently found to have hemorrhagic brain metastases. Patient admitted to [facility] ICU for close observation.      Left occipital hemorrhagic mass  - MRI pending  - q1h neuro checks  - Keppra 1000mg BID for seizure ppx  - decadron 10 mg once then 4 mg Q6H   - PPI and SSI   - HOB >30 degrees   - Nsurg consulted  - oncology consulted      HTN  - patient at goal BP less than 160  - home amlodipine and losartan   - hold off on restarting chlorthalidone   - labetalol and hydralazine PRNs     Anemia  Hgb 10.1 on 10/28/21, now 7.6. Has been trending down since started new chemo regimen on 8/26/21. Likely due to chemo, cancer and hemorragic metastasis. Cannot rule out GI bleed or intraabdominal bleed.    - monitor with daily CBC  - consider CTAP if drops more   - iron studies      Paroxysmal Afib  - rate controlled  - previously on xarelto, now reversed with kcentra in ED     Nausea and vomiting  - zofran PRN     Leukocytosis   Patient afebrile and not septic appearing. Not tachycardic or hypotensive. Leukocytosis likely reactive. - will monitor with daily CBC     Will discuss with attending physician Dr. Brandy Yang. Code Status:Full code  FEN: NPO  PPX: SCDs  DISPO: IP    Matthew Urban MD PGY-1  12/1/2021,  3:45 AM       Patient was seen, examined and discussed with Dr. Kvng Larson. I agree with the history of present illness, past medical/surgical histories, family history, social history, medication list and allergies as listed. The review of systems is as noted above. My physical exam confirms the findings listed  Chart was reviewed including labs, CT scan and medical records confirm the findings noted      Metastatic lung cancer with multiple brain mets.     Radiation oncology consulted  Discussed with neurology   Consult palliative care

## 2021-12-01 NOTE — CONSULTS
[facility]  Palliative Medicine Consultation Note    Date Of Admission:11/30/2021  Date of consult: 12/01/21  Seen by [facility] in the past:  Yes    Recommendations:        Discussed patient's current condition with wife Dimitrios Odonnell at bedside. She expressed frustration that patient's anxiety may not have been adequately treated at times and she was also frustrated that the brain metastases had not been identified sooner. She and Nikky Mcqueen were just recently  2 years ago and she currently lacks any strong social or emotional support nearby within the [facility]. Her mother is a source of emotional support although she currently lives abroad. She seems to have reasonable insight into the severity of her 's illness. They have discussed advanced care planning and she states that he would not want to resuscitated if he would be in a vegetative state without a reasonable chance at recovery. The risks and benefits of CPR were discussed and she expressed understanding. She is hopeful that her 's mental status will improve following the planned surgery that is currently scheduled for Saturday. She therefore would like for the patient to remain full code at this time and will revisit the discussion after the planned surgery. All questions answered to this point. She requested a letter needed by her  to document the severity of her 's condition, which would be needed to allow her mother living overseas to travel to the [facility] under current Covid travel restrictions. This letter will be provided by the Palliative care office. 1. Goals of Care/Advanced Care planning/Code status: Full code, discussed with wife today. Wife is hopeful that his mental status will improve over time and with surgery. 2. Pain: No signs of distress at this time. 3. SOB: pt is breathing comfortably on room air. 4. AMS: pt presented with confusion after a fall.  He was apparently going into other people's homes and in left occipital lobe with extensive surrounding vasogenic edema and local mass effect. Additional subcentimeter hypoattenuating foci in bilateral frontal and right temporal lobes. Imaging findings are concerning for metastasis. Recommended follow-up with contrast-enhanced MRI. CTA HEAD NECK W CONTRAST    (Results Pending)     Conclusion/Time spent:         Recommendations see above    Pt/family [id number]  Time spent with patient and/or family: 20 min  Time reviewing records: 10 min   Time communicating with staff: 5 min     A total of 35 minutes spent with the patient and family on unit greater than 50% in counseling regarding palliative care and in goals of care for the patient. Thank you to Dr. Julian Rivas for this consultation. We will continue to follow Mr. Poli Camargo care as needed.     Sebastian Nascimento NP  [address] [facility]

## 2021-12-02 ENCOUNTER — APPOINTMENT (OUTPATIENT)
Dept: GENERAL RADIOLOGY | Age: 51
DRG: 054 | End: 2021-12-02
Payer: COMMERCIAL

## 2021-12-02 ENCOUNTER — APPOINTMENT (OUTPATIENT)
Dept: CT IMAGING | Age: 51
DRG: 054 | End: 2021-12-02
Payer: COMMERCIAL

## 2021-12-02 PROBLEM — C34.12 MALIGNANT NEOPLASM OF UPPER LOBE BRONCHUS, LEFT (HCC): Status: ACTIVE | Noted: 2021-12-01

## 2021-12-02 LAB
ALBUMIN SERPL-MCNC: 3.7 G/DL (ref 3.4–5)
ANION GAP SERPL CALCULATED.3IONS-SCNC: 16 MMOL/L (ref 3–16)
ANION GAP SERPL CALCULATED.3IONS-SCNC: 16 MMOL/L (ref 3–16)
BASE EXCESS ARTERIAL: -9 (ref -3–3)
BUN BLDV-MCNC: 29 MG/DL (ref 7–20)
BUN BLDV-MCNC: 31 MG/DL (ref 7–20)
CALCIUM IONIZED: 1.28 MMOL/L (ref 1.12–1.32)
CALCIUM SERPL-MCNC: 9.6 MG/DL (ref 8.3–10.6)
CALCIUM SERPL-MCNC: 9.6 MG/DL (ref 8.3–10.6)
CHLORIDE BLD-SCNC: 103 MMOL/L (ref 99–110)
CHLORIDE BLD-SCNC: 103 MMOL/L (ref 99–110)
CO2: 18 MMOL/L (ref 21–32)
CO2: 20 MMOL/L (ref 21–32)
CREAT SERPL-MCNC: 1.3 MG/DL (ref 0.9–1.3)
CREAT SERPL-MCNC: 1.3 MG/DL (ref 0.9–1.3)
EKG ATRIAL RATE: 110 BPM
EKG ATRIAL RATE: 110 BPM
EKG DIAGNOSIS: NORMAL
EKG DIAGNOSIS: NORMAL
EKG P AXIS: 70 DEGREES
EKG P AXIS: 70 DEGREES
EKG P-R INTERVAL: 158 MS
EKG P-R INTERVAL: 158 MS
EKG Q-T INTERVAL: 336 MS
EKG Q-T INTERVAL: 336 MS
EKG QRS DURATION: 72 MS
EKG QRS DURATION: 72 MS
EKG QTC CALCULATION (BAZETT): 454 MS
EKG QTC CALCULATION (BAZETT): 454 MS
EKG R AXIS: 72 DEGREES
EKG R AXIS: 72 DEGREES
EKG T AXIS: 23 DEGREES
EKG T AXIS: 23 DEGREES
EKG VENTRICULAR RATE: 110 BPM
EKG VENTRICULAR RATE: 110 BPM
GFR AFRICAN AMERICAN: >60
GFR AFRICAN AMERICAN: >60
GFR NON-AFRICAN AMERICAN: 58
GFR NON-AFRICAN AMERICAN: 58
GLUCOSE BLD-MCNC: 114 MG/DL (ref 70–99)
GLUCOSE BLD-MCNC: 136 MG/DL (ref 70–99)
GLUCOSE BLD-MCNC: 137 MG/DL (ref 70–99)
GLUCOSE BLD-MCNC: 147 MG/DL (ref 70–99)
GLUCOSE BLD-MCNC: 153 MG/DL (ref 70–99)
GLUCOSE BLD-MCNC: 153 MG/DL (ref 70–99)
GLUCOSE BLD-MCNC: 158 MG/DL (ref 70–99)
GLUCOSE BLD-MCNC: 163 MG/DL (ref 70–99)
GLUCOSE BLD-MCNC: 169 MG/DL (ref 70–99)
GLUCOSE BLD-MCNC: 189 MG/DL (ref 70–99)
HCO3 ARTERIAL: 15.6 MMOL/L (ref 21–29)
HCT VFR BLD CALC: 25.2 % (ref 40.5–52.5)
HEMOGLOBIN: 8.4 G/DL (ref 13.5–17.5)
KEPPRA DOSE AMT: NORMAL
KEPPRA: 40 UG/ML (ref 6–46)
LACTATE: 8.22 MMOL/L (ref 0.4–2)
LACTIC ACID: 2.2 MMOL/L (ref 0.4–2)
LV EF: 53 %
LVEF MODALITY: NORMAL
MAGNESIUM: 1.9 MG/DL (ref 1.8–2.4)
MCH RBC QN AUTO: 34.1 PG (ref 26–34)
MCHC RBC AUTO-ENTMCNC: 33.4 G/DL (ref 31–36)
MCV RBC AUTO: 102 FL (ref 80–100)
O2 SAT, ARTERIAL: 99 % (ref 93–100)
PCO2 ARTERIAL: 24.7 MM HG (ref 35–45)
PDW BLD-RTO: 18.1 % (ref 12.4–15.4)
PERFORMED ON: ABNORMAL
PH ARTERIAL: 7.41 (ref 7.35–7.45)
PHOSPHORUS: 4.7 MG/DL (ref 2.5–4.9)
PLATELET # BLD: 327 K/UL (ref 135–450)
PMV BLD AUTO: 7.4 FL (ref 5–10.5)
PO2 ARTERIAL: 112.4 MM HG (ref 75–108)
POC POTASSIUM: 3.9 MMOL/L (ref 3.5–5.1)
POC SAMPLE TYPE: ABNORMAL
POC SODIUM: 140 MMOL/L (ref 136–145)
POTASSIUM SERPL-SCNC: 4.3 MMOL/L (ref 3.5–5.1)
POTASSIUM SERPL-SCNC: 5.3 MMOL/L (ref 3.5–5.1)
RBC # BLD: 2.47 M/UL (ref 4.2–5.9)
SODIUM BLD-SCNC: 137 MMOL/L (ref 136–145)
SODIUM BLD-SCNC: 139 MMOL/L (ref 136–145)
TCO2 ARTERIAL: 16 MMOL/L
WBC # BLD: 20.4 K/UL (ref 4–11)

## 2021-12-02 PROCEDURE — 2500000003 HC RX 250 WO HCPCS: Performed by: INTERNAL MEDICINE

## 2021-12-02 PROCEDURE — C9113 INJ PANTOPRAZOLE SODIUM, VIA: HCPCS | Performed by: STUDENT IN AN ORGANIZED HEALTH CARE EDUCATION/TRAINING PROGRAM

## 2021-12-02 PROCEDURE — 83605 ASSAY OF LACTIC ACID: CPT

## 2021-12-02 PROCEDURE — 84295 ASSAY OF SERUM SODIUM: CPT

## 2021-12-02 PROCEDURE — 36415 COLL VENOUS BLD VENIPUNCTURE: CPT

## 2021-12-02 PROCEDURE — 70450 CT HEAD/BRAIN W/O DYE: CPT

## 2021-12-02 PROCEDURE — 2000000000 HC ICU R&B

## 2021-12-02 PROCEDURE — 6360000002 HC RX W HCPCS: Performed by: NURSE PRACTITIONER

## 2021-12-02 PROCEDURE — 6360000002 HC RX W HCPCS

## 2021-12-02 PROCEDURE — 6360000002 HC RX W HCPCS: Performed by: STUDENT IN AN ORGANIZED HEALTH CARE EDUCATION/TRAINING PROGRAM

## 2021-12-02 PROCEDURE — 84132 ASSAY OF SERUM POTASSIUM: CPT

## 2021-12-02 PROCEDURE — 99231 SBSQ HOSP IP/OBS SF/LOW 25: CPT | Performed by: INTERNAL MEDICINE

## 2021-12-02 PROCEDURE — 36569 INSJ PICC 5 YR+ W/O IMAGING: CPT

## 2021-12-02 PROCEDURE — 80069 RENAL FUNCTION PANEL: CPT

## 2021-12-02 PROCEDURE — 2700000000 HC OXYGEN THERAPY PER DAY

## 2021-12-02 PROCEDURE — 93005 ELECTROCARDIOGRAM TRACING: CPT | Performed by: NURSE PRACTITIONER

## 2021-12-02 PROCEDURE — 85027 COMPLETE CBC AUTOMATED: CPT

## 2021-12-02 PROCEDURE — 94761 N-INVAS EAR/PLS OXIMETRY MLT: CPT

## 2021-12-02 PROCEDURE — 82947 ASSAY GLUCOSE BLOOD QUANT: CPT

## 2021-12-02 PROCEDURE — C1751 CATH, INF, PER/CENT/MIDLINE: HCPCS

## 2021-12-02 PROCEDURE — 71045 X-RAY EXAM CHEST 1 VIEW: CPT

## 2021-12-02 PROCEDURE — 2500000003 HC RX 250 WO HCPCS: Performed by: STUDENT IN AN ORGANIZED HEALTH CARE EDUCATION/TRAINING PROGRAM

## 2021-12-02 PROCEDURE — 2580000003 HC RX 258: Performed by: STUDENT IN AN ORGANIZED HEALTH CARE EDUCATION/TRAINING PROGRAM

## 2021-12-02 PROCEDURE — 6370000000 HC RX 637 (ALT 250 FOR IP): Performed by: STUDENT IN AN ORGANIZED HEALTH CARE EDUCATION/TRAINING PROGRAM

## 2021-12-02 PROCEDURE — 02HV33Z INSERTION OF INFUSION DEVICE INTO SUPERIOR VENA CAVA, PERCUTANEOUS APPROACH: ICD-10-PCS | Performed by: INTERNAL MEDICINE

## 2021-12-02 PROCEDURE — 2580000003 HC RX 258: Performed by: NURSE PRACTITIONER

## 2021-12-02 PROCEDURE — 2580000003 HC RX 258: Performed by: INTERNAL MEDICINE

## 2021-12-02 PROCEDURE — 80177 DRUG SCRN QUAN LEVETIRACETAM: CPT

## 2021-12-02 PROCEDURE — 83735 ASSAY OF MAGNESIUM: CPT

## 2021-12-02 PROCEDURE — 82803 BLOOD GASES ANY COMBINATION: CPT

## 2021-12-02 PROCEDURE — 93010 ELECTROCARDIOGRAM REPORT: CPT | Performed by: INTERNAL MEDICINE

## 2021-12-02 PROCEDURE — 82330 ASSAY OF CALCIUM: CPT

## 2021-12-02 PROCEDURE — 99233 SBSQ HOSP IP/OBS HIGH 50: CPT

## 2021-12-02 PROCEDURE — C8929 TTE W OR WO FOL WCON,DOPPLER: HCPCS

## 2021-12-02 PROCEDURE — 95813 EEG EXTND MNTR 61-119 MIN: CPT

## 2021-12-02 RX ORDER — LORAZEPAM 2 MG/ML
INJECTION INTRAMUSCULAR
Status: COMPLETED
Start: 2021-12-02 | End: 2021-12-02

## 2021-12-02 RX ORDER — SODIUM CHLORIDE 0.9 % (FLUSH) 0.9 %
5-40 SYRINGE (ML) INJECTION PRN
Status: DISCONTINUED | OUTPATIENT
Start: 2021-12-02 | End: 2021-12-03 | Stop reason: HOSPADM

## 2021-12-02 RX ORDER — LIDOCAINE HYDROCHLORIDE 10 MG/ML
5 INJECTION, SOLUTION EPIDURAL; INFILTRATION; INTRACAUDAL; PERINEURAL ONCE
Status: COMPLETED | OUTPATIENT
Start: 2021-12-02 | End: 2021-12-02

## 2021-12-02 RX ORDER — LORAZEPAM 2 MG/ML
2 INJECTION INTRAMUSCULAR ONCE
Status: DISCONTINUED | OUTPATIENT
Start: 2021-12-02 | End: 2021-12-03

## 2021-12-02 RX ORDER — LORAZEPAM 2 MG/ML
INJECTION INTRAMUSCULAR
Status: DISPENSED
Start: 2021-12-02 | End: 2021-12-02

## 2021-12-02 RX ORDER — SODIUM CHLORIDE 9 MG/ML
25 INJECTION, SOLUTION INTRAVENOUS PRN
Status: DISCONTINUED | OUTPATIENT
Start: 2021-12-02 | End: 2021-12-03 | Stop reason: HOSPADM

## 2021-12-02 RX ORDER — SODIUM CHLORIDE 9 MG/ML
INJECTION, SOLUTION INTRAVENOUS CONTINUOUS
Status: DISCONTINUED | OUTPATIENT
Start: 2021-12-02 | End: 2021-12-03 | Stop reason: HOSPADM

## 2021-12-02 RX ORDER — SODIUM CHLORIDE 0.9 % (FLUSH) 0.9 %
5-40 SYRINGE (ML) INJECTION EVERY 12 HOURS SCHEDULED
Status: DISCONTINUED | OUTPATIENT
Start: 2021-12-02 | End: 2021-12-03 | Stop reason: HOSPADM

## 2021-12-02 RX ORDER — LABETALOL HYDROCHLORIDE 5 MG/ML
10 INJECTION, SOLUTION INTRAVENOUS EVERY 4 HOURS PRN
Status: DISCONTINUED | OUTPATIENT
Start: 2021-12-02 | End: 2021-12-03 | Stop reason: HOSPADM

## 2021-12-02 RX ORDER — MAGNESIUM SULFATE IN WATER 40 MG/ML
2000 INJECTION, SOLUTION INTRAVENOUS ONCE
Status: COMPLETED | OUTPATIENT
Start: 2021-12-02 | End: 2021-12-02

## 2021-12-02 RX ORDER — 0.9 % SODIUM CHLORIDE 0.9 %
500 INTRAVENOUS SOLUTION INTRAVENOUS ONCE
Status: COMPLETED | OUTPATIENT
Start: 2021-12-02 | End: 2021-12-02

## 2021-12-02 RX ADMIN — PANTOPRAZOLE SODIUM 40 MG: 40 INJECTION, POWDER, FOR SOLUTION INTRAVENOUS at 11:49

## 2021-12-02 RX ADMIN — DEXAMETHASONE SODIUM PHOSPHATE 4 MG: 4 INJECTION, SOLUTION INTRAMUSCULAR; INTRAVENOUS at 23:06

## 2021-12-02 RX ADMIN — DEXAMETHASONE SODIUM PHOSPHATE 4 MG: 4 INJECTION, SOLUTION INTRAMUSCULAR; INTRAVENOUS at 11:47

## 2021-12-02 RX ADMIN — LABETALOL HYDROCHLORIDE 10 MG: 5 INJECTION, SOLUTION INTRAVENOUS at 20:49

## 2021-12-02 RX ADMIN — SODIUM CHLORIDE 2000 MG: 900 INJECTION, SOLUTION INTRAVENOUS at 08:16

## 2021-12-02 RX ADMIN — DEXAMETHASONE SODIUM PHOSPHATE 4 MG: 4 INJECTION, SOLUTION INTRAMUSCULAR; INTRAVENOUS at 17:36

## 2021-12-02 RX ADMIN — SODIUM CHLORIDE, PRESERVATIVE FREE 10 ML: 5 INJECTION INTRAVENOUS at 21:31

## 2021-12-02 RX ADMIN — SODIUM CHLORIDE: 9 INJECTION, SOLUTION INTRAVENOUS at 09:49

## 2021-12-02 RX ADMIN — INSULIN LISPRO 1 UNITS: 100 INJECTION, SOLUTION INTRAVENOUS; SUBCUTANEOUS at 21:01

## 2021-12-02 RX ADMIN — LEVETIRACETAM 2000 MG: 100 INJECTION, SOLUTION INTRAVENOUS at 19:51

## 2021-12-02 RX ADMIN — INSULIN LISPRO 1 UNITS: 100 INJECTION, SOLUTION INTRAVENOUS; SUBCUTANEOUS at 15:17

## 2021-12-02 RX ADMIN — SODIUM CHLORIDE: 9 INJECTION, SOLUTION INTRAVENOUS at 18:16

## 2021-12-02 RX ADMIN — LABETALOL HYDROCHLORIDE 10 MG: 5 INJECTION, SOLUTION INTRAVENOUS at 11:45

## 2021-12-02 RX ADMIN — LEVETIRACETAM 1000 MG: 100 INJECTION, SOLUTION INTRAVENOUS at 04:43

## 2021-12-02 RX ADMIN — SODIUM CHLORIDE 500 ML: 9 INJECTION, SOLUTION INTRAVENOUS at 04:42

## 2021-12-02 RX ADMIN — LORAZEPAM 2 MG: 2 INJECTION INTRAMUSCULAR; INTRAVENOUS at 08:15

## 2021-12-02 RX ADMIN — INSULIN LISPRO 1 UNITS: 100 INJECTION, SOLUTION INTRAVENOUS; SUBCUTANEOUS at 17:28

## 2021-12-02 RX ADMIN — LIDOCAINE HYDROCHLORIDE 5 ML: 10 INJECTION, SOLUTION EPIDURAL; INFILTRATION; INTRACAUDAL; PERINEURAL at 10:56

## 2021-12-02 RX ADMIN — DEXAMETHASONE SODIUM PHOSPHATE 4 MG: 4 INJECTION, SOLUTION INTRAMUSCULAR; INTRAVENOUS at 04:35

## 2021-12-02 RX ADMIN — MAGNESIUM SULFATE HEPTAHYDRATE 2000 MG: 2 INJECTION, SOLUTION INTRAVENOUS at 14:25

## 2021-12-02 RX ADMIN — HYDRALAZINE HYDROCHLORIDE 10 MG: 20 INJECTION INTRAMUSCULAR; INTRAVENOUS at 15:15

## 2021-12-02 NOTE — PROGRESS NOTES
RT Response to Rapid Response call: PT is on 2 lpm via NC with POx of 100%. Medical Team at Bedside.

## 2021-12-02 NOTE — PROGRESS NOTES
NEUROSURGERY PROGRESS NOTE    12/2/2021 6:42 AM                               Melvin Schneider                      LOS: 2 days     Subjective: Patient laying in bed upon entering the room. Patient was tachycardic and diaphoretic overnight multiple times, including early this morning during assessment with Dr. Alonso Espinoza. Patient appeared to be seizing with right arm rigidity, tongue deviation and minimal response to stimuli. CT Head and cvEEG was ordered at that time, and Dr. Alonso Espinoza request patient transfer back to ICU. When patient was on the way to CT he became tachycardic, diaphoretic and had full body tremors. Patient was brought back up to his room on 5T and a Rapid Response was called. Patient continued to have full body tremors and remained unresponsive. 2 mg Ativan was ordered and seizure activity stopped approximately a minute later. Patient was very lethargic after Ativan but maintained his airway and O2 sat stayed at 100% on 2L O2 via n/c. Patient was taken down to CT again and just before transferring from bed to scanner the patient became tachycardic, diaphoretic and had full body tremors. 2 mg Ativan was requested, but not given and seizure activity spontaneously ended. CT Head scan shows stable hemorrhagic mets. Patient transferred to ICU and cvEEG started    Physical Exam:  Patient seen and examined    Vitals:    12/02/21 0300   BP: (!) 134/92   Pulse: 103   Resp: 16   Temp: 98.8 °F (37.1 °C)   SpO2: 98%     GCS: Before Ativan  4 - Opens eyes on own  1 - Makes no noise  4 - Moves part of body but does not remove noxious stimulus    GCS: After Ativan  1 - Does not open eyes  1 - Makes no noise  4 - Moves part of body but does not remove noxious stimulus    General: Ill appearing, encephalopathic, diaphoretic     HENT: atraumatic, neck supple  Eyes: Optic discs: Not tested  Pulmonary: unlabored respiratory effort  Cardiovascular:  Warm well perfused.  No peripheral edema  Gastrointestinal: abdomen soft, NT, ND     Neurological:  Mental Status: Encephalopathic  Attention: Exam limited 2/2 encephalopathy  Language: Exam limited 2/2 encephalopathy  Sensation: Moves all extremities to noxious tactile stimuli  Coordination: Exam limited 2/2 encephalopathy     Cranial Nerves:  II: Visual acuity not tested, denies new visual changes / diplopia  III, IV, VI: PERRL, Left 5 mm and Right 3 mm, EOM exam limited 2/2 encephalopathy  V: Facial sensation exam limited 2/2 encephalopathy  VII: Face symmetric  VIII: Hearing exam limited 2/2 encephalopathy  IX: Palate movement exam limited 2/2 encephalopathy  XI: Shoulder shrug exam limited 2/2 encephalopathy  XII: Tongue midline     Musculoskeletal:   Gait: Not tested   Assist devices: None   Tone: Increased tone  Motor strength: Exam limited 2/2 encephalopathy but does move all extremities to noxious tactile stimuli    Radiological Findings:  CT Head WO Contrast  Result Date: 11/30/2021  3.1 cm hemorrhagic mass in left occipital lobe with extensive surrounding vasogenic edema and local mass effect. Additional subcentimeter hypoattenuating foci in bilateral frontal and right temporal lobes. Imaging findings are concerning for metastasis. Recommended follow-up with contrast-enhanced MRI. MRI BRAIN W WO CONTRAST  Result Date: 12/1/2021  1. At least 25 enhancing lesions compatible with extensive intracranial metastatic disease. Many lesions are very small and subtle. Large hemorrhagic lesion in the left occipital lobe with enhancement compatible with hemorrhagic metastasis. 2. Multiple tiny diffusion restriction abnormalities without associated enhancing lesion compatible with multiple small infarcts. Additional larger infarcts suspected in the posterior right temporal and lateral right occipital region and in the medial right temporal lobe. 3. Vasogenic edema associated with the left occipital lobe lesion with mild mass effect upon the posterior horn of the left lateral ventricle. No evidence of hydrocephalus or significant mass effect otherwise. CT HEAD WO CONTRAST  Result Date: 12/1/2021  3 cm hemorrhagic mass in the left occipital lobe with surrounding vasogenic edema, unchanged. Punctate hyperdensity is seen and the base of the right frontal lobe similar to the prior study possibly metastatic. No new hemorrhage seen. CTA HEAD NECK W CONTRAST  Result Date: 12/1/2021  There is areas of diffuse irregularity and narrowing seen in the middle cerebral arteries bilaterally, left greater than right. There is also irregularity and narrowing of the bilateral A1 segments but no definite evidence of flow significant stenosis with normal enhancement seen distally. Extensive changes related to metastatic disease are noted and better evaluated on the MRI from earlier the same day. CT HEAD WO CONTRAST  Result Date: 12/2/2021  Stable appearance of the brain since yesterday's MRI with multiple hemorrhagic masses. The largest in the left occipital lobe has not changed, measuring 3.2 x 2.8 cm. Some subcentimeter hyperdense lesions (that were present on MRI) were not previously visualized on the CT from yesterday.     Labs:  Recent Labs     12/01/21  0335   WBC 13.8*   HGB 7.6*   HCT 22.3*          Recent Labs     12/01/21  0335      K 4.9      CO2 23   BUN 17   CREATININE 1.0   GLUCOSE 128*   CALCIUM 9.6       Recent Labs     11/30/21  1550   PROTIME 19.4*   INR 1.68*       Patient Active Problem List    Diagnosis Date Noted    Metastatic lung cancer (metastasis from lung to other site) ([address] 12/01/2021    Brain metastases ([address] 12/01/2021    ICH (intracerebral hemorrhage) ([address] 12/01/2021    Cerebrovascular accident (CVA) due to embolism of posterior cerebral artery (HCC)     Encephalopathy acute     Altered mental status     Encounter for palliative care     Brain mass 11/30/2021    Paroxysmal atrial fibrillation ([address] 01/17/2020    Essential hypertension 01/17/2020 Assessment:  Patient is a 46 y.o. male w/AMS, nausea and vomiting. CT Head revealed 3.1 cm hemorrhagic mass in left occipital lobe with extensive surrounding vasogenic edema, brain compression and local mass effect. Patient's spouse educated from [facility] to Neurosurgery\" book pp. 5-7, 25-27, 26-40     Plan:  1. Neurologic exams frequency: Q4H  2. For change in exam MUST contact neurosurgery team along with critical care or primary team  3. Brain Mass:  - MRI Brain shows numerous mets  - Radiation Oncology following, appreciate recs  - Oncology following, appreciate recs and prognosis  - Plan for surgical resection this Saturday, if seizures controlled  4. Cerebral edema:  - Keep Na within normal limits  - Decadron 4 mg Q6H; PPI & SSI while on Decadron  - Keep HOB >30 degrees  - NO dextrose in IVF's or in IV drips  - If central venous access is needed please use subclavian vs femoral - No IJ as this can decrease venous return and worsen cerebral edema  5. Seizure:  - 2 mg Ativan given during seizure episode  - Keppra increased from 1000 mg to 2000 mg BID for seizure  - cvEEG  - Neurology following, appreciate recs  6. Stroke:  - MRI Brain revealed multiple infarcts with larger infarcts suspected in the posterior right temporal and lateral right occipital region and in the medial right temporal lobe. - CTA Head/Neck revealed narrowing seen in the middle cerebral arteries bilaterally, left greater than right. There is also irregularity and narrowing of the bilateral A1 segments but no definite evidence of flow significant stenosis  - Neurology following, appreciate recs  7. NPO  8. DVT Prophylaxis: SCD's & OK to start SQ Heparin, if medically indicated  9. Will follow inpatient.  Please call with any questions or decline in neurological status     DISPO: Remain in ICU from neurosurgery standpoint.  Dispo timing to be determined by primary team once patient is medically stable for discharge.     Patient was seen and examined with Dr. Timbo Karimi who agrees with above assessment and plan.      Electronically signed by: TITA Lamar CNP, APRN-CNP, 12/2/2021 6:42 AM  [phone]

## 2021-12-02 NOTE — PROGRESS NOTES
Speech Language Pathology  Dysphagia - attempt    Chart reviewed, d/w RN [facility] who reports pt has been lethargic but okay to attempt to see pt. Swabbed pt mouth, pt opened eyes briefly. Stepped out of room and upon return neurosurgery team present and concerned for change in pt status. Pt going for CT and being t/f to ICU. Will follow up as pt appropriate      Thuy Vo M.S./CCC-SLP #[id number]  Pg.  # [id number]

## 2021-12-02 NOTE — PROGRESS NOTES
CONTINUOUS EEG    Name:  Tami Koch Record Number:  [id number]  Age: 46 y.o. Gender: male  : 1970  Today's Date:  2021  Room:  [address] [place] [address]  Vital Signs   BP (!) 144/92   Pulse 92   Temp 98.8 °F (37.1 °C) (Axillary)   Resp 20   Ht 6' 4\" (1.93 m)   Wt 216 lb 4.3 oz (98.1 kg)   SpO2 100%   BMI 26.33 kg/m²           Continuous EEG Testing Start Time:  11:30PM    Continuous EEG Testing End Time:       Comments: [facility] contacted at 11:30am. Impendence on all leads within normal limits. Plan of Care: Begin monitoring pt.     Electronically signed by Velvet Saldivar on 2021 at 12:07 PM

## 2021-12-02 NOTE — PLAN OF CARE
Problem: Falls - Risk of:  Goal: Will remain free from falls  Description: Will remain free from falls  Outcome: Ongoing  Note: Pt will remain free from falls. Patient will use call light to notify staff of needs prior to exiting the bed. Patient's bed will remain in lowest position with wheels locked and bed alarm engaged. Problem: Skin Integrity:  Goal: Absence of new skin breakdown  Description: Absence of new skin breakdown  Outcome: Ongoing  Note: Patient's skin will remain intact with no signs of breakdown. Patient will cooperate with staff when repositioning patient.

## 2021-12-02 NOTE — PROCEDURES
PICC PROCEDURE NOTE WITH PCXR NEEDED  Chart reviewed for allergies, diagnosis, labs, known contraindications, reason for line placement and planned length of treatment. Informed consent noted to be signed and on chart. Insertion procedure discussed with patient/family member. Three patient identifiers  Patient name,   and MRN -  completed &  confirmed verbally. Time out performed Hat, mask and eye shield donned. PICC site scrubbed with Chloraprep  One-Step applicator for 30 seconds x 1. Hand Hygiene  performed with 3% Chlorhexidine surgical scrub x1 min prior to  Sterile gloves, sterile gown being donned. Patient draped using maximal sterile barrier technique ( head to toe ). PICC site scrubbed a 2nd time with Chloraprep One-Step applicator x 30 sec. Modified Seldinger  technique/ultrasound assisted and tip locating system utilized for insertion. 1% Lidocaine 5 ml injected interdermally pre-insertion. PCXR obtained d/t unable to confirm PICC tip with 3CG, will place note and order with clarification of radiologist reading for clearance to use line when available and notify RN. Positive brisk blood return obtained from all lumens  and each lumen flushed with 10 mls  0.9% Sterile Sodium Chloride. All lumens flush easily with no resistance. Valve placed on all lumens followed by Alcohol Swab Caps on end of each. Bio-patch in place. Catheter secured with non-sutured locking device per hospital protocol. Sterile Tegaderm applied over PICC site. Sterile field maintained during procedure. Guide wire and positioning wire accounted for post procedure and disposed of in sharps. Appearance of site is Clean dry and intact without bleeding or edema. All edges of Tegaderm occlusive. Site marked with date and initials of RN placing line. Teaching performed to pt/family and noted in education section. Bed placed in low position post-procedure.
Per [facility] PICC now in the low SVC and okay to use.
Result of PICC noted, d/w Dr Shellye Osler in radiology regarding attempting to sit pt up and power flush line to drop into SVC and he was in agreement. Pt assisted per HOB straight up  PICC power flushed repeat PCXR ordered.
seizures were seen during this recording. Clinical correlation is advised.         Adriel Wheatley MD PhD

## 2021-12-02 NOTE — CONSULTS
NEUROSURGERY CONSULT NOTE    Fernando Lopez  [id number]   1970 12/1/2021    Requesting physician: Gus Cohn MD    Reason for consultation:    History of present illness: Patient is a 46 y.o. male  has a past medical history of Hypertension and Metastatic Lung CA. Patient  presented on 12/1/2021 to [facility] ED w/AMS. Patient is not a proper historian so information provided by spouse who was at bedside. 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 The patient started acting strange Monday, went to [facility], and got IV fluids. However, his mental status worsened today. CT Head shows hemorrhagic mass in left occipital lobe. ROS:   ANITRA 2/2 encephalopathy    Allergies   Allergen Reactions    Eggs Or Egg-Derived Products        Past Medical History:   Diagnosis Date    Hypertension         Past Surgical History:   Procedure Laterality Date    BRONCHOSCOPY  12/17/2020    BRONCHOSCOPY W/EBUS FNA performed by Misty Man MD at [place] [id number]  12/17/2020    BRONCHOSCOPY/ENDOBRONCHIAL LUNG BIOPSY performed by Misty Man MD at [facility] CT NEEDLE BIOPSY LIVER PERCUTANEOUS  6/4/2021    CT NEEDLE BIOPSY LIVER PERCUTANEOUS 6/4/2021 [facility] CT SCAN    CT NEEDLE BIOPSY LIVER PERCUTANEOUS  7/19/2021    CT NEEDLE BIOPSY LIVER PERCUTANEOUS 7/19/2021 [facility] CT SCAN    KNEE ARTHROSCOPY         Social History     Occupational History    Not on file   Tobacco Use    Smoking status: Never Smoker    Smokeless tobacco: Never Used   Substance and Sexual Activity    Alcohol use: Not Currently    Drug use: Not Currently    Sexual activity: Not on file        History reviewed. No pertinent family history.      Outpatient Medications Marked as Taking for the 11/30/21 encounter [facility] Encounter)   Medication Sig Dispense Refill    rivaroxaban (XARELTO) 20 MG TABS tablet Take 1 tablet by mouth daily (with breakfast) 90 tablet 3        Current Facility-Administered Medications   Medication Dose Route Frequency Provider Last Rate Last Admin    pantoprazole (PROTONIX) injection 40 mg  40 mg IntraVENous Daily Nadia Dolan MD   40 mg at 12/01/21 0815    insulin lispro (1 Unit Dial) 0-6 Units  0-6 Units SubCUTAneous TID WC Nadia Dolan MD        insulin lispro (1 Unit Dial) 0-3 Units  0-3 Units SubCUTAneous Nightly Nadia Dolan MD        glucose (GLUTOSE) 40 % oral gel 15 g  15 g Oral PRN Nadia Dolan MD        dextrose 50 % IV solution  12.5 g IntraVENous PRN Nadia Dolan MD        glucagon (rDNA) injection 1 mg  1 mg IntraMUSCular PRN Nadia Dolan MD        dextrose 5 % solution  100 mL/hr IntraVENous PRN Nadia Dolan MD        perflutren lipid microspheres (DEFINITY) injection 1.65 mg  1.5 mL IntraVENous ONCE PRN TITA Landis CNP        levETIRAcetam (KEPPRA) 1,000 mg in sodium chloride 0.9 % 100 mL IVPB  1,000 mg IntraVENous Q12H Staci Bond MD   Stopped at 12/01/21 1715    acetaminophen (TYLENOL) tablet 650 mg  650 mg Oral Q4H PRN Staci Bond MD        ondansetron (ZOFRAN-ODT) disintegrating tablet 4 mg  4 mg Oral Q8H PRN Staci Bond MD        Or    ondansetron [facility]) injection 4 mg  4 mg IntraVENous Q6H PRN Staci Bond MD        losartan (COZAAR) tablet 100 mg  100 mg Oral Daily Nadia Dolan MD        amLODIPine (NORVASC) tablet 5 mg  5 mg Oral Daily Nadia Dolan MD        dexamethasone (DECADRON) injection 4 mg  4 mg IntraVENous Q6H TITA Huitron CNP   4 mg at 12/01/21 1713    labetalol (NORMODYNE;TRANDATE) injection 10 mg  10 mg IntraVENous Q6H PRN Nadia Dolan MD        hydrALAZINE (APRESOLINE) injection 10 mg  10 mg IntraVENous Q6H PRN Nadia Dolan MD            Objective:  BP (!) 159/140   Pulse 101   Temp 98.5 °F (36.9 °C) (Oral)   Resp 27   Ht 6' 4\" (1.93 m)   Wt 216 lb 4.3 oz (98.1 kg)   SpO2 100%   BMI 26.33 kg/m²     Physical Exam:   Patient seen and examined  GCS:  4 - Opens eyes on own  4 - Seems confused, disoriented  6 - Follows simple motor commands  General: Well developed. Alert and cooperative in no acute distress. HENT: atraumatic, neck supple  Eyes: Optic discs: Not tested  Pulmonary: unlabored respiratory effort  Cardiovascular:  Warm well perfused. No peripheral edema  Gastrointestinal: abdomen soft, NT, ND    Neurological:  Mental Status: Awake, alert, oriented to self and situation, speech clear and appropriate  Attention: Intact  Language: No aphasia or dysarthria noted  Sensation: Intact to all extremities to light touch  Coordination: Intact    Cranial Nerves:  II: Visual acuity not tested, denies new visual changes / diplopia  III, IV, VI: PERRL, 3 mm bilaterally, EOMI, no nystagmus noted  V: Facial sensation intact bilaterally to touch  VII: Face symmetric  VIII: Hearing intact bilaterally to spoken voice  IX: Palate movement equal bilaterally  XI: Shoulder shrug equal bilaterally  XII: Tongue midline    Musculoskeletal:   Gait: Not tested   Assist devices: None   Tone: Normal  Motor strength:    Right  Left    Right  Left    Deltoid  5 5  Hip Flex  5 5   Biceps  5 5  Knee Extensors  5 5   Triceps  5 5  Knee Flexors  5 5   Wrist Ext  5 5  Ankle Dorsiflex. 5 5   Wrist Flex  5 5  Ankle Plantarflex. 5 5   Handgrip  5 5  Ext Trav Longus  5 5   Thumb Ext  5 5         Radiological Findings:    I personally reviewed the patient's imaging which consists of a CT head dated 11/30/2021 and MRI dated 11/30/2021. These demonstrate a hemorrhagic lesion in the left occipital lobe at the tentorial edge. Extensive surrounding vasogenic edema. Numerous small additional metastases are noted.      Labs:  Recent Labs     12/01/21  0335   WBC 13.8*   HGB 7.6*   HCT 22.3*          Recent Labs     12/01/21  0335      K 4.9      CO2 23   BUN 17   CREATININE 1.0   GLUCOSE 128*   CALCIUM 9.6       Recent Labs     11/30/21  1550   PROTIME 19.4* INR 1.68*       Patient Active Problem List    Diagnosis Date Noted    Metastatic lung cancer (metastasis from lung to other site) [facility] 12/01/2021    Brain metastases ([facility] 12/01/2021    ICH (intracerebral hemorrhage) ([facility] 12/01/2021    Cerebrovascular accident (CVA) due to embolism of posterior cerebral artery ([facility]     Encephalopathy acute     Altered mental status     Encounter for palliative care     Brain mass 11/30/2021    Paroxysmal atrial fibrillation ([facility] 01/17/2020    Essential hypertension 01/17/2020       Assessment:  Patient is a 46 y.o. male w/AMS, nausea and vomiting. CT Head revealed 3.1 cm hemorrhagic mass in left occipital lobe with extensive surrounding vasogenic edema, brain compression and local mass effect. Patient has known mets to bone + liver. Plan:  1. Neurologic exams frequency:  - ICU: Q1H until otherwise directed  2. For change in exam MUST contact neurosurgery team along with critical care or primary team  3. Brain Mass:  - Keppra 1000mg BID for seizure prophylaxis  - Will discuss role for resection with oncology and rad onc. 4. Cerebral edema:  - Keep Na within normal limits  - Decadron 10 mg once, then 4 mg Q6H; PPI & SSI while on Decadron  - Keep HOB >30 degrees  5. Thank you for consult. Will follow inpatient.   Please call with any questions or decline in neurological status    Milka Adler MD, PhD  [address], [address] [place], [place], [address] [phone] (c), [phone] (o)

## 2021-12-02 NOTE — PROGRESS NOTES
Hospitalist Progress Note      PCP: Lenox Collet, MD    Date of Admission: 11/30/2021    Chief Complaint: Altered mental status    Hospital Course: 45 yo male with PMH afib on xarelto and stage IVb pulmonary adenocarcinoma who presented with AMS after fall subsequently found to have hemorrhagic brain metastases. Subjective: Overnight patient had seizures and was transferred to ICU for continuous EEG monitoring. He remains unresponsive    Medications:  Reviewed    Infusion Medications    sodium chloride 100 mL/hr at 12/02/21 0949    sodium chloride      dextrose       Scheduled Medications    LORazepam  2 mg IntraVENous Once    LORazepam        levetiracetam  2,000 mg IntraVENous Q12H    LORazepam  2 mg IntraVENous Once    sodium chloride flush  5-40 mL IntraVENous 2 times per day    pantoprazole  40 mg IntraVENous Daily    insulin lispro  0-6 Units SubCUTAneous TID WC    insulin lispro  0-3 Units SubCUTAneous Nightly    [Held by provider] losartan  100 mg Oral Daily    [Held by provider] amLODIPine  5 mg Oral Daily    dexamethasone  4 mg IntraVENous Q6H     PRN Meds: sodium chloride flush, sodium chloride, glucose, dextrose, glucagon (rDNA), dextrose, perflutren lipid microspheres, acetaminophen, ondansetron **OR** ondansetron, labetalol, hydrALAZINE      Intake/Output Summary (Last 24 hours) at 12/2/2021 1320  Last data filed at 12/2/2021 0300  Gross per 24 hour   Intake 413.48 ml   Output 680 ml   Net -266.52 ml       Physical Exam Performed:    BP (!) 173/110   Pulse 105   Temp 98.5 °F (36.9 °C) (Axillary)   Resp 23   Ht 6' 4\" (1.93 m)   Wt 216 lb 4.3 oz (98.1 kg)   SpO2 100%   BMI 26.33 kg/m²     General appearance: No acute distress, laying with eyes closed,   HEENT: Pupils equal, round, and reactive to light. Conjunctivae/corneas clear. Neck: Supple, with full range of motion. No jugular venous distention. Trachea midline. Respiratory:  Normal respiratory effort.  Clear to auscultation, bilaterally without Rales/Wheezes/Rhonchi. Cardiovascular: Regular rate and rhythm with normal S1/S2 without murmurs, rubs or gallops. Abdomen: Soft, non-tender, non-distended with normal bowel sounds. Musculoskeletal: No clubbing, cyanosis or edema bilaterally. Full range of motion without deformity. Skin: Skin color, texture, turgor normal.  No rashes or lesions. Neurologic: Encephalopathic, unable to follow commands. Somnolent  Psychiatric: Somnolent  Capillary Refill: Brisk,3 seconds, normal   Peripheral Pulses: +2 palpable, equal bilaterally       Labs:   Recent Labs     11/30/21  1550 12/01/21  0335 12/02/21  0553   WBC 11.8* 13.8* 20.4*   HGB 7.6* 7.6* 8.4*   HCT 22.8* 22.3* 25.2*    267 327     Recent Labs     11/30/21  1550 12/01/21  0335 12/02/21  0553    137 137   K 3.6 4.9 5.3*   CL 98* 102 103   CO2 22 23 18*   BUN 16 17 29*   CREATININE 1.1 1.0 1.3   CALCIUM 9.5 9.6 9.6     Recent Labs     11/30/21  1550   AST 89*   ALT 98*   BILIDIR 0.5*   BILITOT 2.0*   ALKPHOS 155*     Recent Labs     11/30/21  1550   INR 1.68*     No results for input(s): CKTOTAL, TROPONINI in the last 72 hours. Urinalysis:      Lab Results   Component Value Date    NITRU Negative 11/30/2021    WBCUA 3-5 11/30/2021    BACTERIA 4+ 11/30/2021    RBCUA None seen 11/30/2021    BLOODU Negative 11/30/2021    SPECGRAV 1.015 11/30/2021    GLUCOSEU Negative 11/30/2021       Radiology:  XR CHEST PORTABLE   Final Result      Left arm PICC tip standardly positioned in the low SVC. Examination otherwise unchanged. XR CHEST PORTABLE   Final Result      Left arm PICC tip extends superiorly with the tip in the upper right brachiocephalic vein. Nonemergent repositioning is indicated. Lungs clear. Normal cardiac mediastinal silhouette. CT HEAD WO CONTRAST   Final Result   Impression:       Stable appearance of the brain since yesterday's MRI with multiple hemorrhagic masses.  The largest irregularity and narrowing of the bilateral A1 segments but no definite evidence of flow significant stenosis with normal enhancement seen distally. Extensive changes related to metastatic disease are noted and better evaluated on the MRI from earlier the same day. MRI BRAIN W WO CONTRAST   Final Result   1. At least 25 enhancing lesions compatible with extensive intracranial metastatic disease. Many lesions are very small and subtle. Large hemorrhagic lesion in the left occipital lobe with enhancement compatible with hemorrhagic metastasis. 2. Multiple tiny diffusion restriction abnormalities without associated enhancing lesion compatible with multiple small infarcts. Additional larger infarcts suspected in the posterior right temporal and lateral right occipital region and in the medial    right temporal lobe. 3. Vasogenic edema associated with the left occipital lobe lesion with mild mass effect upon the posterior horn of the left lateral ventricle. No evidence of hydrocephalus or significant mass effect otherwise. CT Head WO Contrast   Final Result      3.1 cm hemorrhagic mass in left occipital lobe with extensive surrounding vasogenic edema and local mass effect. Additional subcentimeter hypoattenuating foci in bilateral frontal and right temporal lobes. Imaging findings are concerning for metastasis. Recommended follow-up with contrast-enhanced MRI.               Assessment/Plan:    Active Hospital Problems    Diagnosis     Malignant neoplasm of upper lobe bronchus, left (HCC) [C34.12]     Brain metastases ([address] [C79.31]     ICH (intracerebral hemorrhage) (HCC) [I61.9]     Cerebrovascular accident (CVA) due to embolism of posterior cerebral artery (HCC) [I63.439]     Encephalopathy acute [G93.40]     Altered mental status [R41.82]     Encounter for palliative care [Z51.5]     Paroxysmal atrial fibrillation ([address] [I48.0]     Essential hypertension [I10]      Acute encephalopathy  Left occipital lobe hemorrhagic mass  Multiple brain mets  Vasogenic edema  Seizures  Neurosurgery following  Continue neurochecks  Continue Decadron  Keep head of the bed elevated  Keppra 2000 mg twice daily. On continuous EEG  MRI of the brain with at least 25 enhancing lesions compatible with extensive intracranial metastatic disease  Radiation oncology following plan for whole brain radiation as outpatient  Palliative care has been consulted    Metastatic lung cancer  Oncology following  Patient refractory to treatment with primary combination chemoimmunotherapy  Plan to enroll in single agent chemotherapy versus clinical trial participation as per oncology    Lactic acidosis secondary to seizures  Continue to monitor    Hypertension  Losartan, amlodipine on hold    Paroxysmal atrial fibrillation  Xarelto on hold given hemorrhagic mets    Anemia  Continue to monitor    DVT Prophylaxis: SCD  Diet: Diet NPO  Code Status: Full Code    PT/OT Eval Status: Pending    Dispo -poor prognosis.     Kwan Naylor MD

## 2021-12-02 NOTE — PROGRESS NOTES
Messaged hospitalist after finding patient covered in sweat. Monitor watcher notified this RN that patient's HR was sustaining in the 150s. VS - /92; ; temp 98.8 (axillary); O2 99%; blood sugar 153. I lowered temperature of room and removed a blanket. Will continue to monitor and assess patient.

## 2021-12-02 NOTE — PROGRESS NOTES
Oncology [facility]ematology [facility]  Progress Note    Subjective:     Pt was seizing and a rapid response called this morning when I came to see him. Review of Systems:     Review of Systems   Unable to perform ROS: Acuity of condition       Objective:     HISTORY OF PRESENT ILLNESS:  Mr. Bam Bailey  is a 46 y.o. male we are seeing in consultation for metastatic lung adenocarcinoma. He is a lifelong non-smoker. His lung cancer history is documented in the note below.      For this hospitalization, he presented with gait disturbance and fall. He was directed to the emergency room for further evaluation and underwent a head CT that demonstrated a 3.1 cm hemorrhagic mass in the left occipital lobe with accompanying vasogenic edema and mass-effect. There were additional foci of metastasis in both frontal lobes and the right temporal lobe. He underwent an MRI of the brain That demonstrated multiple enhancing lesions. There were also evidence of multiple small infarcts. There was vasogenic edema associated with the dominant mass in the left occipital lobe. Oncology History (Recent Office Note)  Patient Name: Ludivina Hairston  Patient : 1970  Patient MRN: [id number]     Primary Oncologist: Khushi Guzmna (Radiation Oncology), Dion Giordano  Referring Physician: Lobo Evasn     Date of Service: 2021     Chief Complaint  Non-small cell lung cancer (disorder) ( Stage Date: 2020, Stage IVB (Primary, Left upper lobe, bronchus or lung, T2a, N2, M1c)-Pathological  Date of Dx:2020 Extent of Disease: Evidence of metastatic disease; Disease State: Initial diagnosis; Metastatic Sites: Adrenal gland, left; Metastatic Sites: Bone; MET gene mutation: Not detected; PD-L1: 1-49% Expression; RET gene mutation: RET fusion negative; ROS1 Gene: Negative; BRAF Mutation: Wild-type; Histopathologic Type: Adenocarcinoma;  Histologic Grade: G2; EGFR Expression: Positive-EGFR sensitizing mutation; ALK (FISH): Negative; TRK gene: Negative;)        Problem List  Non-small cell lung cancer (disorder) ( Stage Date: 12/18/2020, Stage IVB (Primary, Left upper lobe, bronchus or lung, T2a, N2, M1c)-Pathological  Date of Dx:12/18/2020 Extent of Disease: Evidence of metastatic disease; Disease State: Initial diagnosis; Metastatic Sites: Adrenal gland, left; Metastatic Sites: Bone; MET gene mutation: Not detected; PD-L1: 1-49% Expression; RET gene mutation: RET fusion negative; ROS1 Gene: Negative; BRAF Mutation: Wild-type; Histopathologic Type: Adenocarcinoma; Histologic Grade: G2; EGFR Expression: Positive-EGFR sensitizing mutation; ALK (FISH): Negative; TRK gene: Negative;)  Chronic atrial fibrillation  Effects of immunotherapy  Essential hypertension  High risk drug monitoring status (finding)  Insomnia  Secondary malignant neoplasm of adrenal gland (disorder)  Secondary malignant neoplasm of bone  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 This prompted referral to pulmonology, Dr. Audrey Mane. He underwent bronchoscopy with biopsy of both the mass and a sub-carinal lymph node. This yielded a diagnosis of pulmonary adenocarcinoma. A PET/CT confirmed the hypermetabolic left upper lobe mass with hypermetabolic adenopathy in both the left hilum and mediastinum. There was a hypermetabolic left adrenal mass as well as hypermetabolism in the left second rib and the posterior right 11th rib. There was also focal hypermetabolism within the prostate. Previous Therapies  Bronchoscopy with biopsy December 2020  Palliative radiation therapy to right 12th rib and left upper lobe lung, complete February 2021  Osimertinib as primary metastatic therapy.   February 2021-present     Current Therapy  Pembrolizumab + Pemetrexed + Carboplatin Q21D Cycle Length: 21 Number Cycles: 8 Start: C1D1 on 2021 Assoc Dx: Non-small cell lung cancer (disorder) LOT: 2nd Line Metastatic 10/28/2021 C1 D1  Pembrolizumab IV, 200 mg  Pemetrexed IV, 1230 mg (500 mg/m2)  Carboplatin IV,  mg  Cyanocobalamin IM,  mcg  Cyanocobalamin IM, 1000 mcg  Dexamethasone Oral, 4 mg bid  OHC Hydration Cycle Length: 1 Number Cycles: 1 Start: Abdirahman Alamin on 2021 Assoc Dx: Non-small cell lung cancer (disorder) LOT: Toxicity Management 2021 C1 D1  Sodium Chloride IV 0.9 %,  mL, Dose modified  Cycle 4 of carboplatin/pemetrexed/pembrolizumab delivered 2021     Interval History  Follow-up scans were performed on November 15, 2021. There was evidence of progression in the liver and the intrathoracic lymph nodes.     Review of Systems  Constitutional:  No weight loss, No fever, No chills, No night sweats. Energy level good.   Eyes:  No impairment or change in vision  ENT / Mouth:  No pain, abnormal ulceration, bleeding, nasal drip or change in voice or hearing  Cardiovascular:  No chest pain, palpitations, new edema, or calf discomfort  Respiratory:  No pain, hemoptysis, change to breathing  Breast:  No pain, discharge, change in appearance or texture  Gastrointestinal:  No pain, cramping, jaundice, change to eating and bowel habits  Urinary:  No pain, bleeding or change in continence  Genitalia: No pain, bleeding or discharge  Musculoskeletal:  No redness, pain, edema or weakness  Skin:  No pruritus, rash, change to nodules or lesions  Neurologic:  No discomfort, change in mental status, speech, sensory or motor activity  Psychiatric:  No change in concentration or change to affect or mood  Endocrine:  No hot flashes, increased thirst, or change to urine production  Hematologic: No petechiae, ecchymosis or bleeding  Lymphatic:  No lymphadenopathy or lymphedema  Allergy / Immunologic:  No eczema, hives, frequent or recurrent infections        Vital Signs  Blood pressure: 100/64, R arm, Regular, Pulse: 88, Temperature: 97.9 F, Respirations: 16, O2 sat: , Pain Scale: 0, Height: 77 in, Weight: 235.4 lb, BSA: 2.41, BMI: 27.91 kg/m2     Physical Exam     CONSTITUTIONAL: awake, alert, cooperative, no apparent distress   EYES: pupils equal, round and reactive to light, sclera clear and conjunctiva normal  ENT: Normocephalic, without obvious abnormality, atraumatic  NECK: supple, symmetrical, no jugular venous distension and no carotid bruits   HEMATOLOGIC/LYMPHATIC: no cervical, supraclavicular or axillary lymphadenopathy   LUNGS: no increased work of breathing and clear to auscultation; Approximate 10 cm soft fleshy mass medial suprascapular region on the right  CARDIOVASCULAR: irregular rate and rhythm, normal S1 and S2, no murmur noted  ABDOMEN: normal bowel sounds x 4, soft, non-distended, non-tender, no masses palpated, no hepatosplenomegaly   MUSCULOSKELETAL: full range of motion noted, tone is normal  NEUROLOGIC: awake, alert, oriented to name, place and time. Motor skills grossly intact. SKIN: Normal skin color, texture, turgor and no jaundice.  appears intact   EXTREMITIES: Without cyanosis, clubbing, edema or asymmetry        Labs  CBC  Lab Results     11/18/2021      11/02/2021      10/28/2021      10/07/2021      09/16/2021      08/26/2021    CBC            WBC x 10^3/uL       9.2 (H) 6.1       5.3       4.3       4.0 (L)  4.8      RBC x 10^6/uL        2.83 (L)            3.35 (L)            3.16 (L)            3.64 (L)            3.92 (L)       4.57 (L)      HGB g/dL    9.4 (L)  10.8 (L)            10.1 (L)            11.3 (L)            11.9 (L)            13.5 (L)      HCT %        28.3 (L)            32.2 (L)            30.2 (L)            33.7 (L)            35.2 (L)            39.9 (L)      MCV fL        100.0 (H)         96.1 (H)           95.6 (H)           92.6 (H)           89.8     87.3      MCH pg 33. 2 (H)           32.2     32.0     31.0     30.4     29.5      MCHC g/dL 33.2     33.5     33.4     33.5     33.8     33.8      RDW-CV, %            17.6 (H)           16.2 (H)           16.9 (H)           15.2 (H)           13.8     13.4      PLT x 10^3/uL         183.0   187.0   228.0   216.0   235.0   165.0      Venancio %         75.8 (H)           80.7 (H)           72.5 (H)           57.9     67.0     62.9      LY %           9.9 (L)  14.6 (L)            17.4 (L)            26.1     20.4 (L)            21.0 (L)      MO %          13.9 (H)           3.8 (L)  9.0       13.2 (H)           10.6     7.5      EO %           0.2 (L)  0.7 (L)  0.7 (L)  2.1       1.5       8.2 (H)      BA %           0.2       0.2       0.4       0.7       0.5       0.4      Venancio # (ANC) x 10^3/uL       6.95 (H)           4.91     3.87     2.46     2.66     3.00      LY # x 10^3/uL        0.91 (L)            0.89 (L)            0.93 (L)            1.11 (L)            0.81 (L)       1.00 (L)      MO # x 10^3/uL       1.28 (H)           0.23 (L)            0.48     0.56     0.42     0.36      EO # x 10^3/uL       0.02 (L)            0.04     0.04     0.09     0.06     0.39      BA # x 10^3/uL        0.02     0.01     0.02     0.03     0.02     0.02  CMP  Lab Results     11/18/2021      11/02/2021      10/28/2021      10/07/2021      09/16/2021      08/26/2021    Chemistries            Glucose mg/dL          106      105      109 (H)            84        144 (H)      BUN mg/dL   25 (H)  19        15        11        15      Creatinine mg/dL      1.30 (H)           1.24 (H)           1.10     1.14 (H)           1.27 (H)      BUN/Creatinine ratio             19.2     15.3     13.6     9.6       11.8      Sodium mmol/L         132 (L) 136      141      141      140      Potassium mmol/L    4.2       3.8       4.0       4.3       3.8      Chloride mmol/L        98        103      105      108 (H)            105      CO2 mmol/L 26.6     26.7     29.2     29.1     27.7      Anion gap, mmol/L    7.4       6.3       6.8       3.9 (L)  7.3      Calcium mg/dL          9.7       9.5       9.7       10.0     9.4      Albumin g/dL             3.9       3.9       3.7       3.8       3.9      Total protein g/dL      7.7       7.7       7.9       7.7       7.6      A/G ratio        1.0       1.0       0.9 (L)  1.0 (L)  1.1      Bilirubin, total mg/dL             0.8       0.8       0.8       0.9       1.0      Alkaline phosphatase U/L     99        86        74        61        61      AST/SGOT U/L         64 (H)  39 (H)  35 (H)  28        29      ALT/SGPT U/L          72 (H)  31        32        23        25      GFR non-African American, estimated mL/min/1.73m2      58.2 (L)            >60.0   >60.0   >60.0   59.8 (L)      GFR African American, estimated mL/min/1.73m2             >60.0   >60.0   >60.0   >60.0   >60.0      Cortisol, total, serum ug/dL   10.35          Imaging  August 25, 2021  CT of chest abdomen and pelvis with contrast     HISTORY: Squamous cell carcinoma with metastasis  COMPARISON: 7/2/2021  CONTRAST:  Oral and 80 mL Isovue-370 intravenous    TECHNIQUE: Individualized dose optimization technique was used in order to meet ALARA standards for radiation dose reduction. In addition to vendor specific dose reduction algorithms, the dose reduction techniques vary based on the specific scanner   utilized but frequently include automated exposure control, adjustment of the mA and/or kV according to patient size, and use of iterative reconstruction technique. COMMENTS:      Chest: Mild degenerative changes in the spine. Small sclerotic lesion anterior T11 and T12 vertebral bodies without change. Sclerotic metastasis in the left second anterior rib again noted. Stable perifissural 4 mm right middle lobe nodule image 3-58. A   few additional small stable pulmonary nodules are also noted.       7 x 6 mm nodule right lower lobe image 3-63 previously measured 5 x 4 mm. 3 mm nodule right lower lobe image 3-83 previously measured 2 mm. New 4 mm nodule left lower lobe image 3-54. Atelectasis/scarring and traction bronchiectasis again noted in the left upper lobe without change and compatible with radiation changes. Abdomen and pelvis: Stable sclerotic lesion in the left posterior iliac bone image 3-172. Bowel is unremarkable. No lymphadenopathy. Gallbladder is within normal limits. Diffuse enlargement of the left adrenal gland suggesting hyperplasia is without   change. Right adrenal gland, kidneys, spleen, and pancreas are unremarkable. There is a mild increase in size of several of the hypodense hepatic metastases. For example, 2.3 x 2.2 cm lesion in the right lobe on image 3-114 previously measured 1.9 x 1.6   cm. Bladder and prostate are unremarkable. Impression     Mild increase in size of 2 subcentimeter pulmonary nodules, with a new left lower lobe nodule. Multiple additional nodules are stable. Continued follow-up is indicated. Mild progression of size of hepatic metastases. Stable small osseous carotid foci as well as discrete metastatic lesion in the left second rib.        November 15, 2021  CT chest abdomen and pelvis with IV  HISTORY: Left upper lobe carcinoma  Scans from thoracic inlet to diaphragm and from diaphragm to pubic symphysis with oral and IV contrast as 80 mL Isovue-370. Comparison 8/25/2021. Up-to-date CT equipment with radiation dose reduction techniques. Chest-  Stable airspace disease with traction bronchiectasis in the left upper lobe, consistent with post irradiation change. No discrete mass in this region to indicate recurrent neoplasm. Suspected intrathoracic sathish metastases include-  Pretracheal-retrocaval node 1.1 x 1.9 cm (previously 0.4 x 0.8 cm), -series 601 image 42. AP window node 1.2 x 1.6 cm , new, image 38.   Subcarinal lymph node 1.3 x 2.5 cm (previously 0.8 x 2.0 cm), image 52  Axillae and supraclavicular fossae unremarkable. Pulmonary nodules include-  6 mm nodule right lower lobe is stable, image 59  3 mm nodule right middle lobe, stable image 55  New 6 mm nodule right lower lobe image 73  8 mm subpleural nodule left lower lobe (previously 4 mm), image 51  6 mm nodule left upper lobe (previously 4 mm), image 35  No suspicious osseous lesion. Thoracic aorta normal size. Central pulmonary arteries patent. Abdomen and pelvis-  Increase in size of multiple hepatic metastases. Index lesion in the right hepatic lobe measures 3.7 x 3.1 cm, image 98 (previously 1 x 1 cm. Another index lesion is located in the inferior right hepatic lobe measuring 4.3 x 4.6 cm, image 120 (previously 1.7 x 1.8 cm). Spleen, pancreas and gallbladder are unremarkable. Left adrenal mass measures 4.4 x 2.5 cm, relatively stable. Right adrenal normal. Both kidneys normal.  Large and small bowel loops of normal caliber. No pneumoperitoneum or ascites. No abdominal or pelvic lymphadenopathy. Urinary bladder normal. Mild prostatomegaly. No suspicious osteolytic or osteoblastic lesion  IMPRESSION:  Stable post irradiation changes left upper lobe  Progression of metastatic disease with at least 3 enlarging or new intrathoracic nodes, interval development of a couple new pulmonary metastases and significant increase in size and number of multiple hepatic metastases  Left adrenal metastasis is stable. Interpreted by:  Steffany Grace MD  Signed by:  Steffany Grace MD  11/15/21     OC[facility] - Patient Care Management     Pain, if applicable:         Performance Status: ECOG 0 Normal activity. Fully active, able to carry on all pre-disease performance without restriction. (Date: 11/02/2021)      Depression Status: Was screened;  Outcome positive: No; Screening Date: 07/08/2021; Screening Tool: Patient Health Questionnaire (PHQ9)     Psycho-social PHQ-9 Follow-up Plan (if applicable):     Assessment & Plan     Metastatic the liver. I suspect that this is a different process and that is why I requested a new biopsy. Biopsy confirmed recurrent metastatic lung adenocarcinoma. Repeat Caris profiling on the liver specimen Redemonstrated the EGFR L858R mutation. So I think we have to conclude that there is a subclone in the liver that is not responding to osimertinib. We therefore needed to change treatments. We switched to carboplatin/pemetrexed/pembrolizumab. He completed 4 cycles in October 2021. Follow-up scans showed further progression. At this point, I am puzzled by the clinical behavior of his cancer. He has not responded to targeted therapy and he has not responded to IO therapy based on current scans. The standard approach at this point would be single agent docetaxel. That is not a particularly attractive option in my view. Alternatively we are seeing pseudoprogression. My plan is to investigate possible clinical trials and in the meantime transition him to maintenance pembrolizumab/pemetrexed with a plan to repeat scans in 6 to 8 weeks.     Previous Caris profile positive for EGFR-L858R. He was started on osimertinib on 2/4/21. He had essentially no response to this and we therefore had to switch to cytotoxic chemotherapy with carboplatin/pemetrexed/pembrolizumab.     He has received his Covid booster and has received his influenza vaccination.     RTC 3 weeks  . Risk of Complications/Morbidity/Mortality High   Illness(es) present that pose threat to life/bodily function   Severe exacerbation of illness/side effects of treatment   Use of parenteral controlled substances   Therapy requiring intensive monitoring for toxicity        Time Based Itemization     A total of * minutes was spent on today's patient encounter.   If applicable, non-patient-facing activities:     (   )   Preparing to see the patient and reviewing records     (   )   Individual interpretation of results      (   )   Discussion or coordination of care with other health care professionals     (   )   Ordering of unique tests, medications, or procedures     (   )   Documentation within the EHR   . Recent imaging and labs were reviewed and discussed with the patient. I have recommended that the patient follow CDC guidelines for prevention of COVID-19 infection.     Armani Barfield. Ruddy An MD, PhD  [facility], Medical Oncology  Co-Director of Clinical Research   for Breast, GI, Phase 1 and Molecular Profiling  [address] [place] [address]  Phone: [phone]  Fax: [phone]  Online: [email or web address]      Note Recipients:   Go Joe (Referring)  JOSE CABRERA MD     Electronically signed by Armani Barfield.  Don VERA, PhD 11/18/2021 12:04 EST    Medications    Current Facility-Administered Medications: 0.9 % sodium chloride bolus, 500 mL, IntraVENous, Once  pantoprazole (PROTONIX) injection 40 mg, 40 mg, IntraVENous, Daily  insulin lispro (1 Unit Dial) 0-6 Units, 0-6 Units, SubCUTAneous, TID WC  insulin lispro (1 Unit Dial) 0-3 Units, 0-3 Units, SubCUTAneous, Nightly  glucose (GLUTOSE) 40 % oral gel 15 g, 15 g, Oral, PRN  dextrose 50 % IV solution, 12.5 g, IntraVENous, PRN  glucagon (rDNA) injection 1 mg, 1 mg, IntraMUSCular, PRN  dextrose 5 % solution, 100 mL/hr, IntraVENous, PRN  perflutren lipid microspheres (DEFINITY) injection 1.65 mg, 1.5 mL, IntraVENous, ONCE PRN  levETIRAcetam (KEPPRA) 1,000 mg in sodium chloride 0.9 % 100 mL IVPB, 1,000 mg, IntraVENous, Q12H  acetaminophen (TYLENOL) tablet 650 mg, 650 mg, Oral, Q4H PRN  ondansetron (ZOFRAN-ODT) disintegrating tablet 4 mg, 4 mg, Oral, Q8H PRN **OR** ondansetron (ZOFRAN) injection 4 mg, 4 mg, IntraVENous, Q6H PRN  losartan (COZAAR) tablet 100 mg, 100 mg, Oral, Daily  amLODIPine (NORVASC) tablet 5 mg, 5 mg, Oral, Daily  [DISCONTINUED] dexamethasone (DECADRON) injection 10 mg, 10 mg, IntraVENous, Q6H **OR** dexamethasone (DECADRON) injection 4 mg, 4 mg, IntraVENous, Q6H  labetalol (NORMODYNE;TRANDATE) injection 10 mg, 10 mg, IntraVENous, Q6H PRN  hydrALAZINE (APRESOLINE) injection 10 mg, 10 mg, IntraVENous, Q6H PRN    Allergies  Allergies   Allergen Reactions    Eggs Or Egg-Derived Products        Physical Exam  VITALS:  BP (!) 134/92   Pulse 103   Temp 98.8 °F (37.1 °C) (Axillary)   Resp 16   Ht 6' 4\" (1.93 m)   Wt 216 lb 4.3 oz (98.1 kg)   SpO2 98%   BMI 26.33 kg/m²   TEMPERATURE:  Current - Temp: 98.8 °F (37.1 °C); Max - Temp  Av.2 °F (36.8 °C)  Min: 97.6 °F (36.4 °C)  Max: 98.8 °F (37.1 °C)  PULSE OXIMETRY RANGE: SpO2  Av.3 %  Min: 97 %  Max: 100 %  24HR INTAKE/OUTPUT:      Intake/Output Summary (Last 24 hours) at 2021 0600  Last data filed at 2021 0300  Gross per 24 hour   Intake 413.48 ml   Output 1220 ml   Net -806.52 ml       Physical Exam    Labs  Recent Labs     21  1550 21  0335   WBC 11.8* 13.8*   HGB 7.6* 7.6*   HCT 22.8* 22.3*    267   .9* 101.0*       Recent Labs     21  1550 21  0335    137   K 3.6 4.9   CL 98* 102   CO2 22 23   BUN 16 17   CREATININE 1.1 1.0       Recent Labs     21  1550   AST 89*   ALT 98*   BILIDIR 0.5*   BILITOT 2.0*   ALKPHOS 155*       No results for input(s): MG in the last 72 hours.     Radiology  Echo Complete    Result Date: 2021  Transthoracic Echocardiography Report (TTE)  Demographics   Patient Name       Lyn Rivera   Date of Study      2021        Gender              Male   Patient Number     [id number]        Date of Birth       1970   Visit Number       [id number]         Age                 46 year(s)   Accession Number   [id number]        Room Number         6337   Corporate ID       [id number]          Karla Clark,                                                           [facility]   Ordering Physician Haley Bustos MD Interpreting        [facility]                                       Physician Emilia Antunez MD  Procedure Type of Study   TTE procedure:ECHOCARDIOGRAM COMPLETE 2D W DOPPLER W COLOR. Procedure Date Date: 12/02/2021 Start: 01:48 PM Study Location: [address] Echo Lab Technical Quality: Limited visualization due to patient immobility. Additional Indications:stroke. Patient Status: Routine Contrast Medium: Bubble Study. Amount - 10 ml Height: 76 inches Weight: 216 pounds BSA: 2.29 m2 BMI: 26.29 kg/m2 BP: 100/67 mmHg  Conclusions   Summary  Left ventricular cavity size is normal. There is mild-moderate concentric  left ventricular hypertrophy. Overall left ventricular systolic function  appears low normal with an estimated ejection fraction of 50-55%. Abnormal  (paradoxical) septal motion is present. Diastolic filling parameters  suggests normal diastolic function. No significant valvular regurgitation or stenosis. IVC size is normal (<2.1cm) and collapses > 50% with respiration consistent  with normal RA pressure (3mmHg). A bubble study was performed, and it was of  minimal diagnostic utility, but no obvious shunt seen. Consider MATI if clinically warranted. Signature   ------------------------------------------------------------------  Electronically signed by Francia Bush MD (Interpreting  physician) on 12/02/2021 at 04:38 PM  ------------------------------------------------------------------   Findings   Left Ventricle  Left ventricular cavity size is normal. There is mild-moderate concentric  left ventricular hypertrophy. Overall left ventricular systolic function  appears low normal with an estimated ejection fraction of 50-55%. Abnormal  (paradoxical) septal motion is present. Diastolic filling parameters  suggests normal diastolic function. Mitral Valve  Mitral valve is minimally thickened. Trivial mitral regurgitation is  present. No evidence of mitral valve stenosis.    Left Atrium  The left atrium appears normal in size. Aortic Valve  The aortic valve is structurally normal. The aortic valve appears tricuspid. Trace aortic regurgitation is present. No evidence of aortic valve stenosis. Aorta  The aortic root is minimally dilated, measuring 4.0 cm. Right Ventricle  The right ventricle is not well visualized. TAPSE measures: 2.03 cm. RV S  velocity measures: 12.1 cm/s. Tricuspid Valve  Tricuspid valve is structurally normal. Trivial tricuspid regurgitation. No  evidence of tricuspid stenosis. Right Atrium  The right atrial size appears normal.   Pulmonic Valve  The pulmonic valve is structurally normal. Trivial pulmonic regurgitation  present. No evidence of pulmonic valve stenosis. Pericardial Effusion  There is a trace circumferential pericardial effusion present. Pleural Effusion  No pleural effusion. Miscellaneous  IVC size is normal (<2.1cm) and collapses > 50% with respiration consistent  with normal RA pressure (3mmHg). A bubble study was performed, and it was of  minimal diagnostic utility, but no obvious shunt seen. M-Mode/2D Measurements (cm)   LV Diastolic Dimension: 1.30 cm LV Systolic Dimension: 1.12 cm  LV Septum Diastolic: 6.97 cm  LV PW Diastolic: 0.84 cm        AO Root Dimension: 4 cm  RV Diastolic Dimension: 4.07 cm LA Dimension: 2.2 cm  Doppler Measurements   AV Peak Velocity: 111 cm/s     MV Peak E-Wave: 50.6 cm/s  AV Peak Gradient: 4.93 mmHg    MV Peak A-Wave: 83.1 cm/s                                 MV E/A Ratio: 0.61   E' Septal Velocity: 7.07 cm/s  E' Lateral Velocity: 12.7 cm/s MV Deceleration Time: 177 msec  E/E' ratio: 4  PV Peak Velocity: 140 cm/s  PV Peak Gradient: 7.84 mmHg   Aortic Valve   Peak Velocity: 111 cm/s  Peak Gradient: 4.93 mmHg  Aorta   Aortic Root: 4 cm      CT HEAD WO CONTRAST    Result Date: 12/2/2021  CT HEAD WO CONTRAST Indication: Change in mental status Technique: Helical CT images of the head were obtained without intravenous contrast media. Axial, coronal, and sagittal reformatted images were constructed from the raw data set. Up-to-date CT equipment and radiation dose reduction techniques were employed. Comparison: CT head dated 12/1/2021 Findings: Stable hemorrhagic mass in the left occipital lobe with surrounding vasogenic edema. Multiple additional hyperdense foci are present and compatible with findings on MRI. Some of the findings were not seen on the prior noncontrast CT. Calvarium is intact. Visualized paranasal sinuses are clear. Impression: Stable appearance of the brain since yesterday's MRI with multiple hemorrhagic masses. The largest in the left occipital lobe has not changed, measuring 3.2 x 2.8 cm. Some subcentimeter hyperdense lesions (that were present on MRI) were not previously visualized on the CT from yesterday. CT HEAD WO CONTRAST    Result Date: 12/1/2021  EXAM: CT HEAD WO CONTRAST ON 12/1/2021 INDICATION: stroke COMPARISON: None. TECHNICAL: Axial CT imaging obtained from vertex to skull base. Axial images and multiplanar reformatted images reviewed. Dose modulation, iterative reconstruction and/or weight based adjustments of the mA/kV were utilized to reduce radiation dose to as low as reasonably achievable IV Contrast: None. LIMITATIONS: None FINDINGS: : No additional abnormality INTRACRANIAL HEMORRHAGE: There is a 3.1 cm hemorrhagic mass in the left occipital lobe unchanged in size and appearance with surrounding vasogenic edema. No new hemorrhage is visualized. VENTRICLES: There is partial effacement of the posterior horn of the left lateral ventricle. Otherwise the ventricles are normal in size and appearance. BRAIN PARENCHYMA: Other than the mass and vasogenic edema in the left occipital lobe parenchyma is normal in appearance. Punctate hyperdensities identified in the right frontal lobe are again identified as seen previously. SKULL: No destructive osseous process or fracture.  PARANASAL SINUSES AND MASTOIDS: The visualized paranasal sinuses and mastoid air cells are clear. ORBITS: Normal as visualized. EXTRACRANIAL SOFT TISSUES: Unremarkable. 3 cm hemorrhagic mass in the left occipital lobe with surrounding vasogenic edema, unchanged. Punctate hyperdensity is seen and the base of the right frontal lobe similar to the prior study possibly metastatic. No new hemorrhage seen. CT Head WO Contrast    Result Date: 11/30/2021  EXAM: CT HEAD WO CONTRAST INDICATION: fall, AMS, anticoagulation; COMPARISON: MRI dated 2/10/2021. TECHNICAL: Axial CT imaging obtained from vertex to skull base. Axial images and multiplanar reformatted images reviewed. Up-to-date CT equipment and radiation dose reduction techniques were employed. IV Contrast: None. FINDINGS: There is a 3.1 cm hemorrhagic mass in the left occipital lobe with extensive surrounding vasogenic edema and local mass effect. Partial effacement of left lateral ventricle on. There are additional subcentimeter hypoattenuating foci in bilateral frontal lobes and right temporal lobe. Punctate hyperdense focus in left frontal lobe (image 44). There is no hydrocephalus. Basal cisterns are patent. Calvarium is intact. Visualized paranasal sinuses and mastoid air cells are clear. 3.1 cm hemorrhagic mass in left occipital lobe with extensive surrounding vasogenic edema and local mass effect. Additional subcentimeter hypoattenuating foci in bilateral frontal and right temporal lobes. Imaging findings are concerning for metastasis. Recommended follow-up with contrast-enhanced MRI. XR CHEST PORTABLE    Result Date: 12/2/2021  Chest portable 1201 HISTORY: Check PICC position     Left arm PICC tip standardly positioned in the low SVC. Examination otherwise unchanged. XR CHEST PORTABLE    Result Date: 12/2/2021  Chest portable 11:15 HISTORY: PICC placement     Left arm PICC tip extends superiorly with the tip in the upper right brachiocephalic vein.  Nonemergent repositioning is indicated. Lungs clear. Normal cardiac mediastinal silhouette. CTA HEAD NECK W CONTRAST    Result Date: 12/1/2021  PROCEDURE: CT ANGIOGRAPHY NECK WITH/WITHOUT CONTRAST INDICATION: Stroke on MRI COMPARISON: none TECHNIQUE: Limited noncontrast CT imaging performed for the purposes of IV contrast bolus tracking. Axial CT imaging obtained from skull base through the lung apices following administration of IV contrast. Axial images, multiplanar reformatted images, and maximum intensity projection images were reviewed for CT angiographic technique. Individualized dose optimization technique was used in order to meet ALARA standards for radiation dose reduction. In addition to vendor specific dose reduction algorithms, the dose reduction techniques vary based on the specific scanner utilized but frequently include automated exposure control, adjustment of the mA and/or kV according to patient size, and use of iterative reconstruction technique. NASCET criteria used to determine percent stenosis measurements (% stenosis = (1-narrowest diameter/diameter of normal distal segment)x100). IV contrast: 80 mL Isovue-370. FINDINGS: AORTIC ARCH: Standard three-vessel aortic arch anatomy is present. INNOMINATE ARTERY: Normal. RIGHT SUBCLAVIAN ARTERY: Normal. RIGHT VERTEBRAL ARTERY: Normal. RIGHT CAROTID ARTERY: No significant stenosis or atherosclerotic change is seen in the right common or internal carotid artery. LEFT SUBCLAVIAN ARTERY: Normal. LEFT VERTEBRAL ARTERY: Normal. LEFT CAROTID ARTERY: No significant stenosis or atherosclerotic change is seen in the left common or internal carotid artery. NECK SOFT TISSUES: No neck soft tissue mass or lymphadenopathy. VISUALIZED MEDIASTINUM: Mediastinal adenopathy is seen. This was recently evaluated on dedicated chest CT. VISUALIZED LUNG APICES: Airspace disease in left lung apex appears similar to recent dedicated chest CT. BONES: No destructive osseous process. OTHER: None. Unremarkable CTA of the neck with no significant vascular pathology identified. PROCEDURE: CT ANGIOGRAPHY HEAD WITH/WITHOUT CONTRAST INDICATION: Stroke on MRI COMPARISON: none TECHNIQUE: Axial CT imaging obtained through the head prior to and following administration of IV contrast. Axial images, multiplanar reformatted images, and maximum intensity projection images were reviewed for CT angiographic technique. IV contrast: 80 mL Isovue-370. FINDINGS: ANTERIOR CIRCULATION: There is diffuse irregularity with mild areas of stenosis seen within the needle cerebral arteries bilaterally. This is more prominent on the left than the right. However, there is no definite occlusion. There is also irregularity and narrowing seen involving the bilateral A1 segments without evidence of resulting occlusion. POSTERIOR CIRCULATION: The bilateral vertebral arteries, basilar artery and posterior cerebral arteries demonstrate no occlusion or stenosis. No evidence for aneurysm or arteriovenous malformation. INTRACRANIAL VENOUS SYSTEM: No evidence for intracranial venous thrombosis. Please refer to the MRI of the brain from earlier the same day for additional results. . OTHER: None. IMPRESSION: There is areas of diffuse irregularity and narrowing seen in the middle cerebral arteries bilaterally, left greater than right. There is also irregularity and narrowing of the bilateral A1 segments but no definite evidence of flow significant stenosis with normal enhancement seen distally. Extensive changes related to metastatic disease are noted and better evaluated on the MRI from earlier the same day. CT CHEST ABDOMEN PELVIS W CONTRAST    Result Date: 11/15/2021  CT chest abdomen and pelvis with IV HISTORY: Left upper lobe carcinoma Scans from thoracic inlet to diaphragm and from diaphragm to pubic symphysis with oral and IV contrast as 80 mL Isovue-370. Comparison 8/25/2021.  Up-to-date CT equipment with radiation dose reduction techniques. Chest- Stable airspace disease with traction bronchiectasis in the left upper lobe, consistent with post irradiation change. No discrete mass in this region to indicate recurrent neoplasm. Suspected intrathoracic sathish metastases include- Pretracheal-retrocaval node 1.1 x 1.9 cm (previously 0.4 x 0.8 cm), -series 601 image 42. AP window node 1.2 x 1.6 cm , new, image 38. Subcarinal lymph node  1.3 x 2.5 cm (previously 0.8 x 2.0 cm), image 47 Axillae and supraclavicular fossae unremarkable. Pulmonary nodules include- 6 mm nodule right lower lobe is stable, image 59 3 mm nodule right middle lobe, stable image 55 New 6 mm nodule right lower lobe image 73 8 mm subpleural nodule left lower lobe (previously 4 mm), image 51 6 mm nodule left upper lobe (previously 4 mm), image 35 No suspicious osseous lesion. Thoracic aorta normal size. Central pulmonary arteries patent. Abdomen and pelvis- Increase in size of multiple hepatic metastases. Index lesion in the right hepatic lobe measures 3.7 x 3.1 cm, image 98 (previously 1 x 1 cm. Another index lesion is located in the inferior right hepatic lobe measuring 4.3 x 4.6 cm, image 120 (previously 1.7 x 1.8 cm). Spleen, pancreas and gallbladder are unremarkable. Left adrenal mass measures 4.4 x 2.5 cm, relatively stable. Right adrenal normal. Both kidneys normal. Large and small bowel loops of normal caliber. No pneumoperitoneum or ascites. No abdominal or pelvic lymphadenopathy. Urinary bladder normal. Mild prostatomegaly. No suspicious osteolytic or osteoblastic lesion     Stable post irradiation changes left upper lobe Progression of metastatic disease with at least 3 enlarging or new intrathoracic nodes, interval development of a couple new pulmonary metastases and significant increase in size and number of multiple hepatic metastases Left adrenal metastasis is stable.      MRI BRAIN W WO CONTRAST    Result Date: 12/1/2021  EXAM: MRI BRAIN W WO CONTRAST INDICATION:Brain met abnormal head CT. Left upper lung cancer. COMPARISON: Head CT 11/30/2021 and MRI the brain 2/10/2021. TECHNIQUE: Multiplanar, multisequence MR imaging of the head obtained with and without IV contrast. IV contrast: 20 mL FINDINGS: SINUSES AND OSSEOUS STRUCTURES: The mastoid air cells and middle ears are clear. The paranasal sinuses are clear. Marrow signal is unremarkable. ORBITS: Unremarkable MIDLINE STRUCTURES: The sella turcica and pituitary gland are normal. Craniovertebral alignment and cerebellar tonsils are normal. VENTRICLES: Normal size and configuration for patient age. Cisternal spaces are intact. INTRACRANIAL HEMORRHAGE: Large intraparenchymal hemorrhage in the left occipital region associated with enhancement compatible with a hemorrhagic metastasis. No additional intracranial hemorrhage identified. BRAIN PARENCHYMA: Multiple enhancing lesions compatible with metastatic disease. Lesions as follows: *  6 mm ring-enhancing lesion right superior frontal region axial image 155 of series 13. *  14 x 13 mm ring-enhancing lesion axial image 146 of series 13. Mild adjacent edema. *  Tiny ring-enhancing lesion anterior right frontal region image 147 measures approximately 4 mm. *  2 adjacent lesions in the anterior right frontal white matter axial image 133 and 134. The larger lesion measures approximately 9 mm and the smaller lesion measures approximately 7 mm. *  Tiny posterior right frontal white matter lesion image 133. *  4 mm enhancing lesion anterior frontal lobe image 126. *  Tiny enhancing lesion right occipital lobe image 98 measuring approximately 4 mm in size. *  4 mm enhancing lesion axial image 65 in the right occipital region laterally. *  4 mm enhancing lesion inferior lateral right occipital lobe image 54. *  Tiny 3 mm subtle enhancing lesion adjacent to the posterior horn of the ventricle in the right parietal white matter axial image 120.  *  Tiny 3 mm enhancing lesion right occipital lobe image 67. *  Tiny 3 mm enhancing lesion in the superior cerebellar vermis image 64. *  5 mm ring-enhancing lesion anterior inferior left cerebellum image 42. *  5 mm ring-enhancing lesion inferior lateral left cerebellum image 28. *  5 mm ring-enhancing lesion left parietal region image 139. *  3 mm enhancing lesion anterior left frontal region image 136. *  4 mm ring-enhancing lesion left frontal lobe image 137. *  Ring-enhancing lesion measuring 16 mm in the anterior left frontal region image 114. *  4 mm enhancing lesion left frontal lobe image 127. *  4 mm ring-enhancing lesion left temporal lobe image 69. *  6 mm enhancing lesion left occipital lobe image 70. *  4 mm ring-enhancing lesion left parietal white matter image 107. *  4 mm ring-enhancing lesion medial left parietal lobe image 111. *  Large enhancing hemorrhagic lesion left occipital lobe abutting the tentorium and falx measuring approximately 32 x 29 x 32 mm. Diffusion weighted images demonstrate numerous punctate diffusion signal abnormalities some of which are associated with enhancing lesions compatible with metastatic disease. Others demonstrate no underlying enhancing lesion and suggests small embolic infarcts. There is a focal signal abnormality in the posterior right temporal and lateral right occipital region which demonstrates diffusion restriction, FLAIR signal abnormality and no definite enhancing lesions suspicious for small infarct. Similar finding in the medial right temporal lobe diffusion-weighted image 40. There is mild mass effect and moderate vasogenic edema associated with the left occipital hemorrhagic metastasis. No significant mass effect otherwise identified. No midline shift. No evidence of hydrocephalus. 1. At least 25 enhancing lesions compatible with extensive intracranial metastatic disease. Many lesions are very small and subtle.  Large hemorrhagic lesion in the left occipital lobe with enhancement compatible with hemorrhagic metastasis. 2. Multiple tiny diffusion restriction abnormalities without associated enhancing lesion compatible with multiple small infarcts. Additional larger infarcts suspected in the posterior right temporal and lateral right occipital region and in the medial right temporal lobe. 3. Vasogenic edema associated with the left occipital lobe lesion with mild mass effect upon the posterior horn of the left lateral ventricle. No evidence of hydrocephalus or significant mass effect otherwise. Pathology      Problem List  Patient Active Problem List   Diagnosis    Paroxysmal atrial fibrillation (HCC)    Essential hypertension    Brain mass    Metastatic lung cancer (metastasis from lung to other site) ([address]    Brain metastases ([address]    ICH (intracerebral hemorrhage) ([address]    Cerebrovascular accident (CVA) due to embolism of posterior cerebral artery ([address]    Encephalopathy acute    Altered mental status    Encounter for palliative care       Assessment and Plan:     Metastatic lung adenocarcinoma  - Left upper lobe primary  - Metastases involving bone, liver, brain  - Lifelong non-smoker  - [facility] Molecular profiling demonstrating EGFR [id number] mutation. Additional pathogenic mutations in MYD 88, PIK3CA, TP53, and a MET Variant of Unknown Significance. - Primary refractory to treatment with osimertinib  - Primary refractory to treatment with combination chemo-immunotherapy utilizing carboplatin/Alimta/pembrolizumab. - Evidence of progression in the liver and lungs by CT November 15, 2021. - New brain metastases as above.     - From a medical oncology perspective, systemic treatment options include single agent chemotherapy such as Taxotere, Gemzar, Navelbine vs clinical trial participation vs best supportive care.   I have sent his Carsavage molecular profile report to Dr. Pricilla Sifuentes at the drug development unit at [facility] in [place] to determine if he is an eligible candidate for a phase 1 clinical trial. 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 His clinical deterioration has been marked and rapid. He will need to make a marked improvement in order to be an appropriate candidate for further systemic therapy. Seizures  - Secondary to brain mets, hemorrhage, infarcts  - Receiving Steroids and Keppra    Multiple Cerebral Infarcts  - History of a-fib treated with Xarelto and Norvasc  - Anticoagulation is on hold due to hemorrhagic brain mets  - Anticoagulation to resume at the discretion of neurosurgery    Atrial Fibrillation  - Rate control with amlodipine.  Xarelto on hold    HTN  - Receiving home losartan and amlodipine      Tash Villeda MD  12/2/2021

## 2021-12-02 NOTE — PROGRESS NOTES
Physical Therapy and Occupational Therapy  Discharge    Chart reviewed. Noted events from this morning. Pt with witnessed tonic clonic seizure and now transferred to ICU for cvEEG. Per discussion with Denis Taylor from Neurosurgery, will sign off from PT/OT standpoint and await new orders once pt is appropriate for therapy.       Yasmine Holbrook, [place] #[id number]   Dorian Mcgee  MS, OTR/L #[id number]

## 2021-12-02 NOTE — SIGNIFICANT EVENT
James was called at approx 0800 patients appeared to be actively seizing. 2mg of ativan was given and keppra was increased to 2grams. Patient had active increase in his CK.  Patient was sent down for a stat ct and will be transferred back to the icu on completion

## 2021-12-02 NOTE — PROGRESS NOTES
Patient arrived to room 5505 from ICU with all belongings, VSS, and no indication that patient is in pain. Fall precautions with camera on for safety. Will continue to monitor and assess patient.

## 2021-12-02 NOTE — FLOWSHEET NOTE
12/02/21 0827   Encounter Summary   Services provided to: Patient   Continue Visiting   (12/2, leighann )   Complexity of Encounter High   Length of Encounter 30 minutes   Routine   Type Initial   Crisis   Type Rapid response   Assessment Unable to respond   Intervention Sustaining presence/ Ministry of presence   Outcome De-escalated   Staff Rowdy Oro MA

## 2021-12-02 NOTE — PROGRESS NOTES
Progress Note    Admit Date: 11/30/2021  Day: 2  Diet: Diet NPO    CC: Hemmorrhagic brain metastasis    Interval history: Patient had seizure this morning in 5T that resolved with 2 mg ativan and 2g Keppra. 1g BID Keppra switched to 2g BID keppra and patient transferred back to ICU for continuous EEG.        Medications:     Scheduled Meds:   LORazepam  2 mg IntraVENous Once    LORazepam        levetiracetam  2,000 mg IntraVENous Q12H    LORazepam  2 mg IntraVENous Once    lidocaine 1 % injection  5 mL IntraDERmal Once    sodium chloride flush  5-40 mL IntraVENous 2 times per day    pantoprazole  40 mg IntraVENous Daily    insulin lispro  0-6 Units SubCUTAneous TID WC    insulin lispro  0-3 Units SubCUTAneous Nightly    losartan  100 mg Oral Daily    amLODIPine  5 mg Oral Daily    dexamethasone  4 mg IntraVENous Q6H     Continuous Infusions:   sodium chloride 100 mL/hr at 12/02/21 0949    sodium chloride      dextrose       PRN Meds:sodium chloride flush, sodium chloride, glucose, dextrose, glucagon (rDNA), dextrose, perflutren lipid microspheres, acetaminophen, ondansetron **OR** ondansetron, labetalol, hydrALAZINE    Objective:   Vitals:   T-max:  Patient Vitals for the past 8 hrs:   BP Temp Temp src Pulse Resp SpO2   12/02/21 1000 (!) 144/92   92 20 100 %   12/02/21 0945 (!) 154/88   101 20    12/02/21 0930 (!) 143/88 98.8 °F (37.1 °C) Axillary 99 18 100 %   12/02/21 0814      100 %   12/02/21 0717 (!) 155/95 98.4 °F (36.9 °C) Axillary 112 16 100 %   12/02/21 0300 (!) 134/92 98.8 °F (37.1 °C) Axillary 103 16 98 %       Intake/Output Summary (Last 24 hours) at 12/2/2021 1041  Last data filed at 12/2/2021 0300  Gross per 24 hour   Intake 413.48 ml   Output 1220 ml   Net -806.52 ml       Review of Systems  Unable to obtain     Physical Exam  Const: well-developed male lying in bed with eyes closed, not responding to questions, somnolent  Head: NCAT   Eyes: PERRLA, EOMI   Neck: supple, no thyromegaly, no LAD   Cardiac: RRR, no m/r/g  Pulm: CTAB   Abdo: soft, nontender, nondistended  Skin: no lesions, no rash  Ext: no peripheral edema, peripheral pulses intact   Neuro: Patient somnolent, does not cooperate which limited neuro exam, moves all 4 extremities  LABS:    CBC:   Recent Labs     11/30/21  1550 12/01/21  0335 12/02/21  0553   WBC 11.8* 13.8* 20.4*   HGB 7.6* 7.6* 8.4*   HCT 22.8* 22.3* 25.2*    267 327   .9* 101.0* 102.0*     Renal:    Recent Labs     11/30/21  1550 12/01/21  0335 12/02/21  0553    137 137   K 3.6 4.9 5.3*   CL 98* 102 103   CO2 22 23 18*   BUN 16 17 29*   CREATININE 1.1 1.0 1.3   GLUCOSE 94 128* 114*   CALCIUM 9.5 9.6 9.6   ANIONGAP 16 12 16     Hepatic:   Recent Labs     11/30/21  1550   AST 89*   ALT 98*   BILITOT 2.0*   BILIDIR 0.5*   PROT 7.7   LABALBU 3.8   ALKPHOS 155*     Troponin: No results for input(s): TROPONINI in the last 72 hours. BNP: No results for input(s): BNP in the last 72 hours. Lipids: No results for input(s): CHOL, HDL in the last 72 hours. Invalid input(s): LDLCALCU, TRIGLYCERIDE  ABGs:    Recent Labs     12/02/21  0810   PHART 7.407   CCV8BBN 24.7*   PO2ART 112.4*   CPA0JTT 15.6*   BEART -9*   O7ZPVKMC 99   WEG3MNG 16       INR:   Recent Labs     11/30/21  1550   INR 1.68*     Lactate:   Recent Labs     12/02/21  0810   LACTATE 8.22*     Cultures:  -----------------------------------------------------------------  RAD:   CT HEAD WO CONTRAST   Final Result   Impression:       Stable appearance of the brain since yesterday's MRI with multiple hemorrhagic masses. The largest in the left occipital lobe has not changed, measuring 3.2 x 2.8 cm. Some subcentimeter hyperdense lesions (that were present on MRI) were not previously visualized on the CT from yesterday. CT HEAD WO CONTRAST   Final Result      3 cm hemorrhagic mass in the left occipital lobe with surrounding vasogenic edema, unchanged.         Punctate hyperdensity is seen and the base of the right frontal lobe similar to the prior study possibly metastatic. No new hemorrhage seen. CTA HEAD NECK W CONTRAST   Final Result      Unremarkable CTA of the neck with no significant vascular pathology identified. PROCEDURE: CT ANGIOGRAPHY HEAD WITH/WITHOUT CONTRAST      INDICATION: Stroke on MRI      COMPARISON: none      TECHNIQUE: Axial CT imaging obtained through the head prior to and following administration of IV contrast. Axial images, multiplanar reformatted images, and maximum intensity projection images were reviewed for CT angiographic technique. IV contrast: 80 mL Isovue-370. FINDINGS:      ANTERIOR CIRCULATION: There is diffuse irregularity with mild areas of stenosis seen within the needle cerebral arteries bilaterally. This is more prominent on the left than the right. However, there is no definite occlusion. There is also irregularity    and narrowing seen involving the bilateral A1 segments without evidence of resulting occlusion. POSTERIOR CIRCULATION: The bilateral vertebral arteries, basilar artery and posterior cerebral arteries demonstrate no occlusion or stenosis. No evidence for aneurysm or arteriovenous malformation. INTRACRANIAL VENOUS SYSTEM: No evidence for intracranial venous thrombosis. Please refer to the MRI of the brain from earlier the same day for additional results. .      OTHER: None. IMPRESSION:      There is areas of diffuse irregularity and narrowing seen in the middle cerebral arteries bilaterally, left greater than right. There is also irregularity and narrowing of the bilateral A1 segments but no definite evidence of flow significant stenosis with normal enhancement seen distally. Extensive changes related to metastatic disease are noted and better evaluated on the MRI from earlier the same day. MRI BRAIN W WO CONTRAST   Final Result   1.  At least 25 enhancing lesions compatible with extensive intracranial metastatic disease. Many lesions are very small and subtle. Large hemorrhagic lesion in the left occipital lobe with enhancement compatible with hemorrhagic metastasis. 2. Multiple tiny diffusion restriction abnormalities without associated enhancing lesion compatible with multiple small infarcts. Additional larger infarcts suspected in the posterior right temporal and lateral right occipital region and in the medial    right temporal lobe. 3. Vasogenic edema associated with the left occipital lobe lesion with mild mass effect upon the posterior horn of the left lateral ventricle. No evidence of hydrocephalus or significant mass effect otherwise. CT Head WO Contrast   Final Result      3.1 cm hemorrhagic mass in left occipital lobe with extensive surrounding vasogenic edema and local mass effect. Additional subcentimeter hypoattenuating foci in bilateral frontal and right temporal lobes. Imaging findings are concerning for metastasis. Recommended follow-up with contrast-enhanced MRI. Assessment/Plan:   Mr. Bette Langley is a 47 yo male with PMH afib on Xarelto and stage IVb pulmonary adenocarcinoma who presented with AMS after fall subsequently found to have hemorrhagic brain metastases. Neurosurgery, rad onc, med onc, and neurology involved in patient's care. Plan is to perform resection of occipital mass later this week. For now we need to ensure adequate medical management of seizures.      Left occipital hemorrhagic mass  Mri with many scattered intracerebral metastatic lesions  - med onc consulted for chemotherapy recommendations  - neurosurgery consulted: plan on resection of L occipital mass this week   - rad onc consulted: plan on whole brain radiation outpatient after surgery   - q1h neuro checks  - Keppra 2000mg BID for seizure ppx  - decadron 10 mg once then 4 mg Q6H   - PPI and SSI   - HOB >30 degrees     Seizures  Likely due to intracerebral mets.   - 2g Keppra BID  - continuous EEG     Multiple small infarcts on brain MRI  Patient compliant with rivaroxaban  - will contact [facility] for [facility] recommendations     HTN  - patient at goal BP less than 160  - home amlodipine and losartan held while NPO   - labetalol and hydralazine PRNs     Anemia  Hgb 10.1 on 10/28/21, now 7.6. Has been trending down since started new chemo regimen on 8/26/21. Likely due to chemo, cancer and hemorragic metastasis. Cannot rule out GI bleed or intraabdominal bleed. - monitor with daily CBC     Paroxysmal Afib  - rate controlled  - previously on xarelto, now reversed with Arrjusto Delphine in ED  - will contact neurosurgery for [facility] recommendations. Patient has multiple infarcts despite therapy with rivaroxaban     Nausea and vomiting  - zofran PRN     Leukocytosis   Patient afebrile and not septic appearing. Not tachycardic or hypotensive. Leukocytosis likely reactive. - will monitor with daily CBC     Code Status: Full code  FEN: NPO  PPX: SCDs  DISPO: ICU    Siomara Frazier MD, PGY-1  12/02/21  10:41 AM    This patient has been staffed and discussed with Dr. Napoleon Monson. Patient was seen, examined and discussed with Dr. Ac Sanchez. I agree with the interval history. My physical exam confirms the findings listed below  Chart was reviewed including labs and medical records confirm the findings noted    Metastatic lung cancer with multiple brain mets, multiple small infarcts, vasogenic edema, no mass effect. New seizure, broke with ativan. Now on keppra up to 2000mg BID  He is on cvEEG. No seizure activity.     Neurosurgery, neurology, oncology and radiation oncology following

## 2021-12-02 NOTE — PROGRESS NOTES
At approximately 0800, this RN received call from [facility] that patient's HR was sustaining the in the 150's. Neurosurgery was rounding at the bedside at that time. Patient was diaphoretic and had little response to stimuli; stat CT ordered and transfer to ICU requested. While transporting patient to CT, patient began to have full body tremors and rigidity - patient returned to room on 5T and rapid response called. Care team responded and 2 mg Ativan administered per verbal order. IV keppra ordered and hung per PIV. Patient taken back down to CT by this RN and neurosurgery NP. Report called to receiving RN on ICU. Patient then taken directly to ICU room 4520 after imaging completed.

## 2021-12-02 NOTE — PROGRESS NOTES
Patient admitted to room 4520 from [address] [place] [address]. Patient is somnolent, not opening eyes or following commands. Patient flexes to painful stimuli in all extremities. Pupils are 3 mm bilaterally with sluggish reaction. No seizure like activity noted on assessment. Lungs are clear bilaterally. Patient saturating 100 % on 2 L nasal cannula. Wife Yajaira Alvarez is at bedside and updated. Will continue to monitor closely.

## 2021-12-02 NOTE — PROGRESS NOTES
Clinical Pharmacy Progress Note    All IV in NS - Management by Pharmacy    Consult Date(s): 11/30  Consulting Provider(s): Evelia Polanco    Assessment / Plan    Hemorrhagic Brain Mass  All IVs in Normal Saline   Drips will be adjusted to normal saline as appropriate based on compatibility, in an effort to avoid fluid shifts, as D5W is osmotically active.  The following intermittent IV drips / infusions have been adjusted to saline:  o Levetiracetam   The following medications must remain in D5W due to incompatibility with normal saline:  o Amiodarone Infusion  o Amphotericin  o Mycophenolate  o Nitroprusside  o Penicillin G Potassium   Note: Patient has D5W ordered as part of hypoglycemia orderset.  Total IV fluid delivered to patient over last 24 hrs: ~700 ml   RPh will follow daily to ensure all IVPBs / drips are in NS where possible. Thank you for consulting Pharmacy! Please call with questions:    Bill Roy. D. BCPS    12/2/2021 7:44 AM  [phone] (wireless)  [phone]  (office)

## 2021-12-02 NOTE — PROGRESS NOTES
Initial Chief Complaint/Reason for Consult: Multiple infarcts seen on MRI    Interval History: This morning, patient had witnessed tonic clonic seizure. He was given 2 mg of ativan, Keppra dose was increased to 2000 mg per neurosurgery. Plan to transfer to ICU for cvEEG. History of Present Illness:  Carmen Ndiaye is a 46 y.o. 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       Patient is drowsy, not responding, history obtained per chart review.     Patient presented on 11/30 with altered mental status. 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 He was acting strange on Monday and went to [facility] and got IV fluids. His mental status worsened (reportedly walking into other people's homes and dressing incorrectly) and he had a headache and multiple episodes of emesis after his fall. He came to the hospital and his CT head showed a hemorrhagic mass in L occipital lobe. He was seen by Neurosurgery and started on decadron and keppra. He was also on xarelto which was reversed in the ER. Follow up MRI showed at least 25 enhancing lesions as well as \"multiple tiny diffusion restriction abnormalities without associated enhancing lesion compatible with small infarcts. Additional larger infarcts suspected in the posterior right temporal and lateral right occipital region and in the medial right temporal lobe\". On my exam, he is lethargic, but he did require multiple sedating medications to complete his MRI.     Review of Systems:   ANITRA d/t encephalopathy    Current Medications:    Current Facility-Administered Medications:     0.9 % sodium chloride bolus, 500 mL, IntraVENous, Once, Amber Ham DO, Last Rate: 100 mL/hr at 12/02/21 0442, 500 mL at 12/02/21 0442    LORazepam (ATIVAN) injection 2 mg, 2 mg, IntraVENous, Once, Levar Juan,     LORazepam (ATIVAN) 2 MG/ML injection, , , ,     LORazepam (ATIVAN) 2 MG/ML injection, , , ,   levETIRAcetam (KEPPRA) 2,000 mg in sodium chloride 0.9 % 250 mL IVPB, 2,000 mg, IntraVENous, Q12H, Levar Juan DO    levETIRAcetam (KEPPRA) 2,000 mg in sodium chloride 0.9 % 250 mL IVPB, 2,000 mg, IntraVENous, Once, Ruth Sawyer, APRN - CNP, Last Rate: 400 mL/hr at 12/02/21 0816, 2,000 mg at 12/02/21 0816    pantoprazole (PROTONIX) injection 40 mg, 40 mg, IntraVENous, Daily, Karly Stokes MD, 40 mg at 12/01/21 0815    insulin lispro (1 Unit Dial) 0-6 Units, 0-6 Units, SubCUTAneous, TID WC, Karly Stokes MD    insulin lispro (1 Unit Dial) 0-3 Units, 0-3 Units, SubCUTAneous, Nightly, Karly Stokes MD    glucose (GLUTOSE) 40 % oral gel 15 g, 15 g, Oral, PRN, Karly Stokes MD    dextrose 50 % IV solution, 12.5 g, IntraVENous, PRN, Karly Stokes MD    glucagon (rDNA) injection 1 mg, 1 mg, IntraMUSCular, PRN, Karly Stokes MD    dextrose 5 % solution, 100 mL/hr, IntraVENous, PRN, Karly Stokes MD    perflutren lipid microspheres (DEFINITY) injection 1.65 mg, 1.5 mL, IntraVENous, ONCE PRN, Jorja Severance, APRN - CNP    acetaminophen (TYLENOL) tablet 650 mg, 650 mg, Oral, Q4H PRN, Mikayla Bianchi MD    ondansetron (ZOFRAN-ODT) disintegrating tablet 4 mg, 4 mg, Oral, Q8H PRN **OR** ondansetron (ZOFRAN) injection 4 mg, 4 mg, IntraVENous, Q6H PRN, Mikayla Bianchi MD    losartan (COZAAR) tablet 100 mg, 100 mg, Oral, Daily, Karly Stokes MD    amLODIPine (NORVASC) tablet 5 mg, 5 mg, Oral, Daily, Karly Stokes MD  [employer]  [DISCONTINUED] dexamethasone (DECADRON) injection 10 mg, 10 mg, IntraVENous, Q6H **OR** dexamethasone (DECADRON) injection 4 mg, 4 mg, IntraVENous, Q6H, TITA Pugh - CNP, 4 mg at 12/02/21 0435    labetalol (NORMODYNE;TRANDATE) injection 10 mg, 10 mg, IntraVENous, Q6H PRN, Karly Stokes MD    hydrALAZINE (APRESOLINE) injection 10 mg, 10 mg, IntraVENous, Q6H PRN, Karly Stokes MD      Physical Exam  Constitutional  BP (!) 155/95   Pulse 112 Temp 98.4 °F (36.9 °C) (Axillary)   Resp 16   Ht 6' 4\" (1.93 m)   Wt 216 lb 4.3 oz (98.1 kg)   SpO2 100%   BMI 26.33 kg/m²     General Drowsy, no distress, well-nourished  Cardiovascular: Rate and rhythm regular  Respiratory: Unlabored respiratory pattern     Neurologic  Mental status:   Orientation does not respond to orientation questions              Attention  Does not attend to exam               Language does not attempt to answer questions              Comprehension does not follow commands     Cranial nerves:   CN2: Blinks to visual threat bilaterally  CN 3,4,6: L pupil slightly larger than R but both reactive to light, eyes midline but drift to the L when held open  CN5: ANITRA d/t encephalopathy  CN7:face symmetric at rest  CN8: hearing exam limited by encephalopathy  CN9: Exam limited d/t encephalopathy  CN11: Exam limited d/t encephalopathy  CN12: Exam limited d/t encephalopathy     Motor Exam:  Flexion to pain in all four extremities        Sensory: grimaces to pain in LUE  Cerebellar/coordination: exam limited d/t encephalopathy  Tone: normal in all 4 extremities  Gait: held for patient safety          Images:   CT head wo contrast:   Impression       3.1 cm hemorrhagic mass in left occipital lobe with extensive surrounding vasogenic edema and local mass effect. Additional subcentimeter hypoattenuating foci in bilateral frontal and right temporal lobes. Imaging findings are concerning for metastasis. MRI wo contrast:   Impression   1. At least 25 enhancing lesions compatible with extensive intracranial metastatic disease. Many lesions are very small and subtle. Large hemorrhagic lesion in the left occipital lobe with enhancement compatible with hemorrhagic metastasis. 2. Multiple tiny diffusion restriction abnormalities without associated enhancing lesion compatible with multiple small infarcts.  Additional larger infarcts suspected in the posterior right temporal and lateral right occipital region and in the medial    right temporal lobe. 3. Vasogenic edema associated with the left occipital lobe lesion with mild mass effect upon the posterior horn of the left lateral ventricle. No evidence of hydrocephalus or significant mass effect otherwise.      CTA Head and Neck:  Impression       Unremarkable CTA of the neck with no significant vascular pathology identified.               PROCEDURE: CT ANGIOGRAPHY HEAD WITH/WITHOUT CONTRAST       INDICATION: Stroke on MRI       COMPARISON: none       TECHNIQUE: Axial CT imaging obtained through the head prior to and following administration of IV contrast. Axial images, multiplanar reformatted images, and maximum intensity projection images were reviewed for CT angiographic technique. IV contrast: 80 mL Isovue-370.       FINDINGS:       ANTERIOR CIRCULATION: There is diffuse irregularity with mild areas of stenosis seen within the needle cerebral arteries bilaterally. This is more prominent on the left than the right. However, there is no definite occlusion. There is also irregularity    and narrowing seen involving the bilateral A1 segments without evidence of resulting occlusion.       POSTERIOR CIRCULATION: The bilateral vertebral arteries, basilar artery and posterior cerebral arteries demonstrate no occlusion or stenosis. No evidence for aneurysm or arteriovenous malformation.       INTRACRANIAL VENOUS SYSTEM: No evidence for intracranial venous thrombosis.       Please refer to the MRI of the brain from earlier the same day for additional results. .       OTHER: None.       IMPRESSION:       There is areas of diffuse irregularity and narrowing seen in the middle cerebral arteries bilaterally, left greater than right.       There is also irregularity and narrowing of the bilateral A1 segments but no definite evidence of flow significant stenosis with normal enhancement seen distally.       Extensive changes related to metastatic disease are noted and better evaluated on the MRI from earlier the same day. Pertinent Labs:   Lactic: 8.22  Glucose: 189    Assessment:  Jasmina Maharaj is a 46 y.o. 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 He had fallen on Sunday and was acting strange after the fall, had trouble dressing appropriately and was walking into other people's houses. CT in the ER revealed a L occipital lobe mass and follow up MRI demonstrated at least 25 enhancing lesions compatible with metastatic disease as well as areas of diffusion restriction compatible with small infarcts.  Will complete stroke work up with vessel imaging, echo.     Plan:  - Keppra increased to 2000 mg BID  - Repeat CT head now  - Plan to transfer to ICU for cvEEG  - If continued seizures on cvEEG, add vimpat 100 mg BID  - CTA with no definite occlusion  - Awaiting ECHO  - Telemetry while inpatient  - Decadron per neurosurgery  - Neurosurgery following, appreciate recs  - Oncology and rad onc following, appreciate recs  - Call neurocritical care with any exam changes      TITA Bond-CNP  Neurology & Neurocritical Care   Neurology Line: [phone]  [employer]: [facility] NPs  12/2/21

## 2021-12-02 NOTE — PROGRESS NOTES
4 Eyes Admission Assessment     I agree as the admission nurse that 2 RN's have performed a thorough Head to Toe Skin Assessment on the patient. ALL assessment sites listed below have been assessed on admission. Areas assessed by both nurses:   [x]   Head, Face, and Ears   [x]   Shoulders, Back, and Chest  [x]   Arms, Elbows, and Hands   [x]   Coccyx, Sacrum, and Ischium  [x]   Legs, Feet, and Heels        Does the Patient have Skin Breakdown? No healing abrasion to right knee noted.         Ricky Prevention initiated:  No   Wound Care Orders initiated:  No      [facility] nurse consulted for Pressure Injury (Stage 3,4, Unstageable, DTI, NWPT, and Complex wounds) or Ricky score 18 or lower:  No      Nurse 1 eSignature: Electronically signed by Adilia Head RN on 12/1/21 at 11:21 PM EST    **SHARE this note so that the co-signing nurse is able to place an eSignature**    Nurse 2 eSignature: {Esignature:[id number]}

## 2021-12-02 NOTE — PROGRESS NOTES
Palliative Care Chart Review  and Check in Note:     NAME:  Myesha Rudd Date: 11/30/2021  Hospital Day:  Hospital Day: 3   Current Code status: Full Code    Palliative care is continuing to following Mr. Ihsan Barajas for symptom management,  and goals of care discussion as needed. Patient's chart reviewed today 12/2/21. D/w Dr. Liliana Ngo. Met with the pt's wife Barb Cooper and her mother over the phone. Sadie's mother is a research oncologist in [employer] [facility] and has a good understanding of pt's condition and the role of palliative care. They report that they are leaning towards not pursuing surgery, given the risk for vision loss. Their goals are comfort directed. We discussed still potentially pursuing whole brain radiation as a palliative measure if pt's mental status improves in the coming days. We also discussed hospice enrollment alongside palliative radiation (if pt will be a candidate for radiation). D/w [facility]ITA [facility] with [address]. Barb Cooper would like to set a password for information provided over the phone - password will be \"Rima\". D/w [facility]. The following are the currently established goals/code status, and Symptom management. Goals of care: Continue with current management. Pt's wife hopeful he will wake up more in the coming days, however goals are ultimately directed at managing symptoms. Code status: Full Code - plan to discuss further    Discharge plan: Pending hospital course, have discussed hospice.         TITA Miller - CNP  12/02/21  4:56 PM

## 2021-12-03 ENCOUNTER — APPOINTMENT (OUTPATIENT)
Dept: MRI IMAGING | Age: 51
DRG: 054 | End: 2021-12-03
Payer: COMMERCIAL

## 2021-12-03 ENCOUNTER — APPOINTMENT (OUTPATIENT)
Dept: CT IMAGING | Age: 51
DRG: 054 | End: 2021-12-03
Payer: COMMERCIAL

## 2021-12-03 VITALS
HEART RATE: 88 BPM | TEMPERATURE: 98.7 F | OXYGEN SATURATION: 100 % | SYSTOLIC BLOOD PRESSURE: 141 MMHG | RESPIRATION RATE: 19 BRPM | WEIGHT: 216.49 LBS | HEIGHT: 76 IN | BODY MASS INDEX: 26.36 KG/M2 | DIASTOLIC BLOOD PRESSURE: 90 MMHG

## 2021-12-03 LAB
ABO/RH: NORMAL
ANION GAP SERPL CALCULATED.3IONS-SCNC: 13 MMOL/L (ref 3–16)
ANTIBODY SCREEN: NORMAL
BUN BLDV-MCNC: 29 MG/DL (ref 7–20)
CALCIUM SERPL-MCNC: 9.1 MG/DL (ref 8.3–10.6)
CHLORIDE BLD-SCNC: 104 MMOL/L (ref 99–110)
CO2: 22 MMOL/L (ref 21–32)
CREAT SERPL-MCNC: 1.1 MG/DL (ref 0.9–1.3)
GFR AFRICAN AMERICAN: >60
GFR NON-AFRICAN AMERICAN: >60
GLUCOSE BLD-MCNC: 124 MG/DL (ref 70–99)
GLUCOSE BLD-MCNC: 131 MG/DL (ref 70–99)
GLUCOSE BLD-MCNC: 134 MG/DL (ref 70–99)
HCT VFR BLD CALC: 20 % (ref 40.5–52.5)
HCT VFR BLD CALC: 20 % (ref 40.5–52.5)
HEMOGLOBIN: 6.7 G/DL (ref 13.5–17.5)
HEMOGLOBIN: 6.8 G/DL (ref 13.5–17.5)
MCH RBC QN AUTO: 34.8 PG (ref 26–34)
MCHC RBC AUTO-ENTMCNC: 33.9 G/DL (ref 31–36)
MCV RBC AUTO: 102.6 FL (ref 80–100)
PDW BLD-RTO: 18.1 % (ref 12.4–15.4)
PERFORMED ON: ABNORMAL
PERFORMED ON: ABNORMAL
PLATELET # BLD: 189 K/UL (ref 135–450)
PMV BLD AUTO: 6.8 FL (ref 5–10.5)
POTASSIUM SERPL-SCNC: 4.4 MMOL/L (ref 3.5–5.1)
RBC # BLD: 1.95 M/UL (ref 4.2–5.9)
SODIUM BLD-SCNC: 139 MMOL/L (ref 136–145)
WBC # BLD: 18 K/UL (ref 4–11)

## 2021-12-03 PROCEDURE — 86923 COMPATIBILITY TEST ELECTRIC: CPT

## 2021-12-03 PROCEDURE — 85018 HEMOGLOBIN: CPT

## 2021-12-03 PROCEDURE — 6360000002 HC RX W HCPCS: Performed by: STUDENT IN AN ORGANIZED HEALTH CARE EDUCATION/TRAINING PROGRAM

## 2021-12-03 PROCEDURE — 70450 CT HEAD/BRAIN W/O DYE: CPT

## 2021-12-03 PROCEDURE — 99232 SBSQ HOSP IP/OBS MODERATE 35: CPT | Performed by: NURSE PRACTITIONER

## 2021-12-03 PROCEDURE — 80048 BASIC METABOLIC PNL TOTAL CA: CPT

## 2021-12-03 PROCEDURE — 85027 COMPLETE CBC AUTOMATED: CPT

## 2021-12-03 PROCEDURE — 2500000003 HC RX 250 WO HCPCS

## 2021-12-03 PROCEDURE — 2580000003 HC RX 258: Performed by: STUDENT IN AN ORGANIZED HEALTH CARE EDUCATION/TRAINING PROGRAM

## 2021-12-03 PROCEDURE — 86901 BLOOD TYPING SEROLOGIC RH(D): CPT

## 2021-12-03 PROCEDURE — 6360000004 HC RX CONTRAST MEDICATION: Performed by: INTERNAL MEDICINE

## 2021-12-03 PROCEDURE — 85014 HEMATOCRIT: CPT

## 2021-12-03 PROCEDURE — 99232 SBSQ HOSP IP/OBS MODERATE 35: CPT | Performed by: INTERNAL MEDICINE

## 2021-12-03 PROCEDURE — 2580000003 HC RX 258: Performed by: INTERNAL MEDICINE

## 2021-12-03 PROCEDURE — 86900 BLOOD TYPING SEROLOGIC ABO: CPT

## 2021-12-03 PROCEDURE — 70553 MRI BRAIN STEM W/O & W/DYE: CPT

## 2021-12-03 PROCEDURE — A9576 INJ PROHANCE MULTIPACK: HCPCS | Performed by: INTERNAL MEDICINE

## 2021-12-03 PROCEDURE — 95813 EEG EXTND MNTR 61-119 MIN: CPT

## 2021-12-03 PROCEDURE — C9113 INJ PANTOPRAZOLE SODIUM, VIA: HCPCS | Performed by: STUDENT IN AN ORGANIZED HEALTH CARE EDUCATION/TRAINING PROGRAM

## 2021-12-03 PROCEDURE — 6360000002 HC RX W HCPCS: Performed by: NURSE PRACTITIONER

## 2021-12-03 PROCEDURE — 36415 COLL VENOUS BLD VENIPUNCTURE: CPT

## 2021-12-03 PROCEDURE — 86850 RBC ANTIBODY SCREEN: CPT

## 2021-12-03 RX ORDER — SCOLOPAMINE TRANSDERMAL SYSTEM 1 MG/1
1 PATCH, EXTENDED RELEASE TRANSDERMAL
Status: DISCONTINUED | OUTPATIENT
Start: 2021-12-03 | End: 2021-12-03

## 2021-12-03 RX ORDER — SODIUM CHLORIDE 9 MG/ML
INJECTION, SOLUTION INTRAVENOUS PRN
Status: DISCONTINUED | OUTPATIENT
Start: 2021-12-03 | End: 2021-12-03 | Stop reason: HOSPADM

## 2021-12-03 RX ORDER — ATROPINE SULFATE 10 MG/ML
2 SOLUTION/ DROPS OPHTHALMIC EVERY 4 HOURS PRN
Status: DISCONTINUED | OUTPATIENT
Start: 2021-12-03 | End: 2021-12-03 | Stop reason: HOSPADM

## 2021-12-03 RX ORDER — PANTOPRAZOLE SODIUM 40 MG/10ML
40 INJECTION, POWDER, LYOPHILIZED, FOR SOLUTION INTRAVENOUS 2 TIMES DAILY
Status: DISCONTINUED | OUTPATIENT
Start: 2021-12-03 | End: 2021-12-03 | Stop reason: HOSPADM

## 2021-12-03 RX ORDER — ATROPINE SULFATE 10 MG/ML
2 SOLUTION/ DROPS OPHTHALMIC EVERY 4 HOURS PRN
Status: DISCONTINUED | OUTPATIENT
Start: 2021-12-03 | End: 2021-12-03

## 2021-12-03 RX ORDER — LORAZEPAM 2 MG/ML
1 INJECTION INTRAMUSCULAR EVERY 6 HOURS PRN
Status: DISCONTINUED | OUTPATIENT
Start: 2021-12-03 | End: 2021-12-03

## 2021-12-03 RX ORDER — MORPHINE SULFATE 4 MG/ML
4 INJECTION, SOLUTION INTRAMUSCULAR; INTRAVENOUS
Status: DISCONTINUED | OUTPATIENT
Start: 2021-12-03 | End: 2021-12-03 | Stop reason: SDUPTHER

## 2021-12-03 RX ORDER — ONDANSETRON 2 MG/ML
4 INJECTION INTRAMUSCULAR; INTRAVENOUS EVERY 6 HOURS PRN
Status: DISCONTINUED | OUTPATIENT
Start: 2021-12-03 | End: 2021-12-03

## 2021-12-03 RX ORDER — ONDANSETRON 2 MG/ML
4 INJECTION INTRAMUSCULAR; INTRAVENOUS EVERY 6 HOURS PRN
Status: DISCONTINUED | OUTPATIENT
Start: 2021-12-03 | End: 2021-12-03 | Stop reason: HOSPADM

## 2021-12-03 RX ORDER — LORAZEPAM 2 MG/ML
1 INJECTION INTRAMUSCULAR EVERY 6 HOURS PRN
Status: DISCONTINUED | OUTPATIENT
Start: 2021-12-03 | End: 2021-12-03 | Stop reason: HOSPADM

## 2021-12-03 RX ORDER — ONDANSETRON 2 MG/ML
4 INJECTION INTRAMUSCULAR; INTRAVENOUS EVERY 6 HOURS PRN
Status: DISCONTINUED | OUTPATIENT
Start: 2021-12-03 | End: 2021-12-03 | Stop reason: SDUPTHER

## 2021-12-03 RX ORDER — MORPHINE SULFATE 2 MG/ML
2 INJECTION, SOLUTION INTRAMUSCULAR; INTRAVENOUS
Status: DISCONTINUED | OUTPATIENT
Start: 2021-12-03 | End: 2021-12-03 | Stop reason: SDUPTHER

## 2021-12-03 RX ORDER — MORPHINE SULFATE 4 MG/ML
4 INJECTION, SOLUTION INTRAMUSCULAR; INTRAVENOUS
Status: DISCONTINUED | OUTPATIENT
Start: 2021-12-03 | End: 2021-12-03

## 2021-12-03 RX ORDER — HEPARIN SODIUM 5000 [USP'U]/ML
5000 INJECTION, SOLUTION INTRAVENOUS; SUBCUTANEOUS EVERY 8 HOURS SCHEDULED
Status: DISCONTINUED | OUTPATIENT
Start: 2021-12-03 | End: 2021-12-03 | Stop reason: HOSPADM

## 2021-12-03 RX ORDER — MORPHINE SULFATE 2 MG/ML
2 INJECTION, SOLUTION INTRAMUSCULAR; INTRAVENOUS
Status: DISCONTINUED | OUTPATIENT
Start: 2021-12-03 | End: 2021-12-03

## 2021-12-03 RX ADMIN — SODIUM CHLORIDE, PRESERVATIVE FREE 10 ML: 5 INJECTION INTRAVENOUS at 08:27

## 2021-12-03 RX ADMIN — PANTOPRAZOLE SODIUM 40 MG: 40 INJECTION, POWDER, FOR SOLUTION INTRAVENOUS at 08:28

## 2021-12-03 RX ADMIN — LEVETIRACETAM 2000 MG: 100 INJECTION, SOLUTION INTRAVENOUS at 08:15

## 2021-12-03 RX ADMIN — HEPARIN SODIUM 5000 UNITS: 5000 INJECTION INTRAVENOUS; SUBCUTANEOUS at 08:27

## 2021-12-03 RX ADMIN — LABETALOL HYDROCHLORIDE 10 MG: 5 INJECTION, SOLUTION INTRAVENOUS at 08:35

## 2021-12-03 RX ADMIN — GADOTERIDOL 20 ML: 279.3 INJECTION, SOLUTION INTRAVENOUS at 10:12

## 2021-12-03 RX ADMIN — DEXAMETHASONE SODIUM PHOSPHATE 4 MG: 4 INJECTION, SOLUTION INTRAMUSCULAR; INTRAVENOUS at 05:36

## 2021-12-03 NOTE — PROGRESS NOTES
Attempted to call pt's wife for MRI checklist. Unable to contact. Will continue to work to get MRI checklist and complete MRI.

## 2021-12-03 NOTE — PROGRESS NOTES
Progress Note    Admit Date: 11/30/2021  Day: 3  Diet: Diet NPO    CC: hemorrhagic brain metastases    Interval history: According to palliative discussion yesterday it seems that wife is leaning more towards palliative measures for patient's illness. Wants to maximize quality of life. Accepts that he will ultimately pass from his disease. Hgb down to 6.7 this AM, ordered head CT with no new ICH or expansion of existing ICH but did show increased vasogenic edema in L frontal lobe. Transfused 1 unit pRBC. No seizure activity on continuous EEG.          Medications:     Scheduled Meds:   LORazepam  2 mg IntraVENous Once    levetiracetam  2,000 mg IntraVENous Q12H    sodium chloride flush  5-40 mL IntraVENous 2 times per day    pantoprazole  40 mg IntraVENous Daily    insulin lispro  0-6 Units SubCUTAneous TID WC    insulin lispro  0-3 Units SubCUTAneous Nightly    [Held by provider] losartan  100 mg Oral Daily    [Held by provider] amLODIPine  5 mg Oral Daily    dexamethasone  4 mg IntraVENous Q6H     Continuous Infusions:   sodium chloride      sodium chloride 50 mL/hr at 12/03/21 0641    sodium chloride      dextrose       PRN Meds:sodium chloride, sodium chloride flush, sodium chloride, labetalol, glucose, dextrose, glucagon (rDNA), dextrose, perflutren lipid microspheres, acetaminophen, ondansetron **OR** ondansetron, hydrALAZINE    Objective:   Vitals:   T-max:  Patient Vitals for the past 8 hrs:   BP Temp Temp src Pulse Resp SpO2 Weight   12/03/21 0630 (!) 156/100   99 23     12/03/21 0600 (!) 160/102   99 28     12/03/21 0530 (!) 163/98   103 21     12/03/21 0500 (!) 156/106   90 26 100 %    12/03/21 0430 (!) 155/101 98.9 °F (37.2 °C) Axillary 110 29     12/03/21 0400 (!) 160/99   112 21 100 %    12/03/21 0300 (!) 144/96   95 18 100 % 216 lb 7.9 oz (98.2 kg)   12/03/21 0230 (!) 151/112   104 19     12/03/21 0130 (!) 158/105   104 19     12/03/21 0100 (!) 153/113   98 18     12/03/21 0000 (!) 145/101 99.7 °F (37.6 °C) Axillary 102 18         Intake/Output Summary (Last 24 hours) at 12/3/2021 0753  Last data filed at 12/3/2021 0500  Gross per 24 hour   Intake 660.57 ml   Output 1155 ml   Net -494.43 ml       Review of Systems  Unable to obtain     Physical Exam  Const: well-developed male lying in bed with eyes closed, not responding to questions, somnolent  Head: NCAT   Eyes: PERRLA, EOMI   Neck: supple, no thyromegaly, no LAD   Cardiac: RRR, no m/r/g  Pulm: CTAB   Abdo: soft, nontender, nondistended  Skin: no lesions, no rash  Ext: no peripheral edema, peripheral pulses intact   Neuro: Patient somnolent, does not cooperate which limited neuro exam, moves all 4 extremities  LABS:    CBC:   Recent Labs     12/01/21  0335 12/01/21  0335 12/02/21  0553 12/03/21  0530 12/03/21  0600   WBC 13.8*  --  20.4* 18.0*  --    HGB 7.6*   < > 8.4* 6.8* 6.7*   HCT 22.3*   < > 25.2* 20.0* 20.0*     --  327 189  --    .0*  --  102.0* 102.6*  --     < > = values in this interval not displayed. Renal:    Recent Labs     12/02/21  0553 12/02/21  1229 12/03/21  0530    139 139   K 5.3* 4.3 4.4    103 104   CO2 18* 20* 22   BUN 29* 31* 29*   CREATININE 1.3 1.3 1.1   GLUCOSE 114* 136* 134*   CALCIUM 9.6 9.6 9.1   MG  --  1.90  --    PHOS  --  4.7  --    ANIONGAP 16 16 13     Hepatic:   Recent Labs     11/30/21  1550 12/02/21  1229   AST 89*  --    ALT 98*  --    BILITOT 2.0*  --    BILIDIR 0.5*  --    PROT 7.7  --    LABALBU 3.8 3.7   ALKPHOS 155*  --      Troponin: No results for input(s): TROPONINI in the last 72 hours. BNP: No results for input(s): BNP in the last 72 hours. Lipids: No results for input(s): CHOL, HDL in the last 72 hours.     Invalid input(s): LDLCALCU, TRIGLYCERIDE  ABGs:    Recent Labs     12/02/21  0810   PHART 7.407   DMT6TIY 24.7*   PO2ART 112.4*   FEZ8DTO 15.6*   BEART -9*   B8HCVYBM 99   SHL6UDK 16       INR:   Recent Labs     11/30/21  1550 mild areas of stenosis seen within the needle cerebral arteries bilaterally. This is more prominent on the left than the right. However, there is no definite occlusion. There is also irregularity    and narrowing seen involving the bilateral A1 segments without evidence of resulting occlusion. POSTERIOR CIRCULATION: The bilateral vertebral arteries, basilar artery and posterior cerebral arteries demonstrate no occlusion or stenosis. No evidence for aneurysm or arteriovenous malformation. INTRACRANIAL VENOUS SYSTEM: No evidence for intracranial venous thrombosis. Please refer to the MRI of the brain from earlier the same day for additional results. .      OTHER: None. IMPRESSION:      There is areas of diffuse irregularity and narrowing seen in the middle cerebral arteries bilaterally, left greater than right. There is also irregularity and narrowing of the bilateral A1 segments but no definite evidence of flow significant stenosis with normal enhancement seen distally. Extensive changes related to metastatic disease are noted and better evaluated on the MRI from earlier the same day. MRI BRAIN W WO CONTRAST   Final Result   1. At least 25 enhancing lesions compatible with extensive intracranial metastatic disease. Many lesions are very small and subtle. Large hemorrhagic lesion in the left occipital lobe with enhancement compatible with hemorrhagic metastasis. 2. Multiple tiny diffusion restriction abnormalities without associated enhancing lesion compatible with multiple small infarcts. Additional larger infarcts suspected in the posterior right temporal and lateral right occipital region and in the medial    right temporal lobe. 3. Vasogenic edema associated with the left occipital lobe lesion with mild mass effect upon the posterior horn of the left lateral ventricle. No evidence of hydrocephalus or significant mass effect otherwise.       CT Head WO Contrast   Final Result 3.1 cm hemorrhagic mass in left occipital lobe with extensive surrounding vasogenic edema and local mass effect. Additional subcentimeter hypoattenuating foci in bilateral frontal and right temporal lobes. Imaging findings are concerning for metastasis. Recommended follow-up with contrast-enhanced MRI. Assessment/Plan:   Mr. Mirian Suazo is a 47 yo male with PMH afib on Xarelto and stage IVb pulmonary adenocarcinoma who presented with AMS after fall subsequently found to have hemorrhagic brain metastases. Neurosurgery, rad onc, med onc, and neurology involved in patient's care. Original plan is to perform resection of occipital mass later this week, but family now considering palliative measures. For now we need to ensure adequate medical management of seizures.      Left occipital hemorrhagic mass  Mri with many scattered intracerebral metastatic lesions  - med onc consulted for chemotherapy recommendations  - neurosurgery consulted, appreciate recs   - rad onc consulted: plan on whole brain radiation outpatient after surgery   - q1h neuro checks  - Keppra 2000mg BID for seizure ppx  - decadron 10 mg once then 4 mg Q6H   - PPI and SSI   - HOB >30 degrees      Seizures  Likely due to intracerebral mets. - 2g Keppra BID  - continuous EEG     Multiple small infarcts on brain MRI  Patient compliant with rivaroxaban  - will contact [facility] for [facility] recommendations   - will hold off as there is current concern for occult bleed     HTN  - patient at goal BP less than 160  - home amlodipine and losartan held while NPO   - labetalol and hydralazine PRNs     Anemia  Hgb 10.1 on 10/28/21, now 6.7. Has been trending down since started new chemo regimen on 8/26/21. Likely due to chemo, cancer and hemorragic metastasis. Cannot rule out GI bleed or intraabdominal bleed.    - monitor with daily CBC  - transfused 1 unit pRBC  - BID PPI      Paroxysmal Afib  - rate controlled  - previously on xarelto, now reversed with [place] in ED  - [facility]. Patient has multiple infarcts despite therapy with rivaroxaban. However, now with downtrending hgb may not be best time to restart AC.      Nausea and vomiting  - zofran PRN     Leukocytosis   Patient afebrile and not septic appearing. Not tachycardic or hypotensive. Leukocytosis likely reactive. - will monitor with daily CBC      Code Status: Full code  FEN: NPO  PPX: SCDs, heparin   DISPO: ICU    Elsa Haider MD, PGY-1  12/03/21  7:53 AM    This patient has been staffed and discussed with Dr. Armando Thompson MD.     Patient was seen, examined and discussed with Dr. Shala Gibbs. I agree with the interval history. My physical exam confirms the findings listed below  Chart was reviewed including labs and medical records confirm the findings noted     Metastatic lung cancer with multiple brain mets, multiple small infarcts, vasogenic edema, no mass effect. CT with increased vasogenic edema. New seizure, broke with ativan. Now on keppra up to 2000mg BID  He is on cvEEG. No seizure activity. Hypertension - requiring hydralazine  Anemia: requiring transfusion of PRBC  Leukocytosis: 20 -> 18    Wife leaning towards hospice.     Discussed with palliative care

## 2021-12-03 NOTE — PROGRESS NOTES
CONTINUOUS EEG    Name:  Tami Koch Record Number:  [id number]  Age: 46 y.o. Gender: male  : 1970  Today's Date:  12/3/2021  Room:  [address] [place] [address]  Vital Signs   BP (!) 156/100   Pulse 99   Temp 98.7 °F (37.1 °C) (Axillary)   Resp 23   Ht 6' 4\" (1.93 m)   Wt 216 lb 7.9 oz (98.2 kg)   SpO2 100%   BMI 26.35 kg/m²           Continuous EEG Testing Start Time:      Continuous EEG Testing End Time:   11:09 AM    Comments:Antoinette Beltran pt's test is now completed. [facility] contacted via team viewer. Plan of Care: Removed all leads, cleansed pt's scalp.     Electronically signed by Monserrat Madrid on 12/3/2021 at 11:34 AM

## 2021-12-03 NOTE — PROGRESS NOTES
Patient going to hospice this afternoon. We will no longer follow. Discontinued cvEEG.     TITA Díaz-CNP  Neurology & Neurocritical Care   Neurology Line: [phone]  [employer]: [facility] Neurology & Neuro Critical [facility] NPs

## 2021-12-03 NOTE — PLAN OF CARE
Problem: Falls - Risk of:  Goal: Will remain free from falls  Description: Will remain free from falls  Outcome: Ongoing  Goal: Absence of physical injury  Description: Absence of physical injury  Outcome: Ongoing     Problem: Skin Integrity:  Goal: Will show no infection signs and symptoms  Description: Will show no infection signs and symptoms  Outcome: Ongoing  Goal: Absence of new skin breakdown  Description: Absence of new skin breakdown  Outcome: Ongoing     Problem: Airway Clearance - Ineffective:  Goal: Ability to maintain a clear airway will improve  Description: Ability to maintain a clear airway will improve  Outcome: Ongoing     Problem: Pain:  Goal: Patient's pain/discomfort is manageable  Description: Patient's pain/discomfort is manageable  Outcome: Ongoing     Problem: Coping:  Goal: Family's ability to cope with current situation will improve  Description: Family's ability to cope with current situation will improve  Outcome: Ongoing  Goal: Ability to cope will improve  Description: Ability to cope will improve  Outcome: Ongoing  Goal: Ability to identify appropriate support needs will improve  Description: Ability to identify appropriate support needs will improve  Outcome: Ongoing     Problem: Health Behavior:  Goal: Ability to identify and utilize available support systems will improve  Description: Ability to identify and utilize available support systems will improve  Outcome: Ongoing  Goal: Ability to manage health-related needs will improve  Description: Ability to manage health-related needs will improve  Outcome: Ongoing     Problem: Physical Regulation:  Goal: Signs of adequate cerebral perfusion will increase  Description: Signs of adequate cerebral perfusion will increase  Outcome: Ongoing  Goal: Ability to maintain a stable neurologic state will improve  Description: Ability to maintain a stable neurologic state will improve  Outcome: Ongoing     Problem: Safety:  Goal: Ability to remain free from injury will improve  Description: Ability to remain free from injury will improve  Outcome: Ongoing  Goal: Free from accidental physical injury  Description: Free from accidental physical injury  Outcome: Ongoing  Goal: Free from intentional harm  Description: Free from intentional harm  Outcome: Ongoing     Problem: Infection:  Goal: Will remain free from infection  Description: Will remain free from infection  Outcome: Ongoing     Problem: Daily Care:  Goal: Daily care needs are met  Description: Daily care needs are met  Outcome: Ongoing     Problem: Skin Integrity:  Goal: Skin integrity will stabilize  Description: Skin integrity will stabilize  Outcome: Ongoing

## 2021-12-03 NOTE — PROGRESS NOTES
Clinical Pharmacy Progress Note    All IV in NS - Management by Pharmacy    Consult Date(s): 11/30  Consulting Provider(s): Erick Stanford    Assessment / Plan    Hemorrhagic Brain Mass  All IVs in Normal Saline   Drips will be adjusted to normal saline as appropriate based on compatibility, in an effort to avoid fluid shifts, as D5W is osmotically active.  The following intermittent IV drips / infusions have been adjusted to saline:  o Levetiracetam   The following medications must remain in D5W due to incompatibility with normal saline:  o Amiodarone Infusion  o Amphotericin  o Mycophenolate  o Nitroprusside  o Penicillin G Potassium   Note: Patient has D5W ordered as part of hypoglycemia orderset. Plan to discharge patient to inpatient hospice today. Pharmacy will sign off consult. Please call with any questions.   Jihan Grijalva, PharmD, [facility]  Wireless: [phone]  12/3/2021 2:44 PM

## 2021-12-03 NOTE — PROGRESS NOTES
Speech Language Pathology  DC    Chart reviewed, d/w RN who states pt remains non responsive. RN in agreement for SLP to dc at this time. Please re-refer if status improves and pt appropriate for evaluation      Chase Luong M.S./[facility]-SLP #[id number]  Pg.  # [phone]

## 2021-12-03 NOTE — PROGRESS NOTES
Oncology Hematology Care  Progress Note    Subjective:     Unresponsive    Review of Systems:     Review of Systems   Unable to perform ROS: Acuity of condition       Objective:     HISTORY OF PRESENT ILLNESS:  Mr. Ihsan Barajas  is a 46 y.o. male we are seeing in consultation for metastatic lung adenocarcinoma. He is a lifelong non-smoker. His lung cancer history is documented in the note below.      For this hospitalization, he presented with gait disturbance and fall. He was directed to the emergency room for further evaluation and underwent a head CT that demonstrated a 3.1 cm hemorrhagic mass in the left occipital lobe with accompanying vasogenic edema and mass-effect. There were additional foci of metastasis in both frontal lobes and the right temporal lobe. He underwent an MRI of the brain That demonstrated multiple enhancing lesions. There were also evidence of multiple small infarcts. There was vasogenic edema associated with the dominant mass in the left occipital lobe. Oncology History (Recent Office Note)  Patient Name: Zarina Gonsalez  Patient : 1970  Patient MRN: [id number]     Primary Oncologist: Tika Shook (Radiation Oncology), Ramiro Lockett  Referring Physician: Vera Kaminski     Date of Service: 2021     Chief Complaint  Non-small cell lung cancer (disorder) ( Stage Date: 2020, Stage IVB (Primary, Left upper lobe, bronchus or lung, T2a, N2, M1c)-Pathological  Date of Dx:2020 Extent of Disease: Evidence of metastatic disease; Disease State: Initial diagnosis; Metastatic Sites: Adrenal gland, left; Metastatic Sites: Bone; MET gene mutation: Not detected; PD-L1: 1-49% Expression; RET gene mutation: RET fusion negative; ROS1 Gene: Negative; BRAF Mutation: Wild-type; Histopathologic Type: Adenocarcinoma;  Histologic Grade: G2; EGFR Expression: Positive-EGFR sensitizing mutation; ALK (FISH): Negative; TRK gene: Negative;)        Problem List  Non-small cell lung cancer (disorder) ( Stage Date: 12/18/2020, Stage IVB (Primary, Left upper lobe, bronchus or lung, T2a, N2, M1c)-Pathological  Date of Dx:12/18/2020 Extent of Disease: Evidence of metastatic disease; Disease State: Initial diagnosis; Metastatic Sites: Adrenal gland, left; Metastatic Sites: Bone; MET gene mutation: Not detected; PD-L1: 1-49% Expression; RET gene mutation: RET fusion negative; ROS1 Gene: Negative; BRAF Mutation: Wild-type; Histopathologic Type: Adenocarcinoma; Histologic Grade: G2; EGFR Expression: Positive-EGFR sensitizing mutation; ALK (FISH): Negative; TRK gene: Negative;)  Chronic atrial fibrillation  Effects of immunotherapy  Essential hypertension  High risk drug monitoring status (finding)  Insomnia  Secondary malignant neoplasm of adrenal gland (disorder)  Secondary malignant neoplasm of bone  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 This prompted referral to pulmonology, Dr. Era Chen. He underwent bronchoscopy with biopsy of both the mass and a sub-carinal lymph node. This yielded a diagnosis of pulmonary adenocarcinoma. A PET/CT confirmed the hypermetabolic left upper lobe mass with hypermetabolic adenopathy in both the left hilum and mediastinum. There was a hypermetabolic left adrenal mass as well as hypermetabolism in the left second rib and the posterior right 11th rib. There was also focal hypermetabolism within the prostate. Previous Therapies  Bronchoscopy with biopsy December 2020  Palliative radiation therapy to right 12th rib and left upper lobe lung, complete February 2021  Osimertinib as primary metastatic therapy.   February 2021-present     Current Therapy  Pembrolizumab + Pemetrexed + Carboplatin Q21D Cycle Length: 21 Number Cycles: 8 Start: C1D1 on 2021 Assoc Dx: Non-small cell lung cancer (disorder) LOT: 2nd Line Metastatic 10/28/2021 C1 D1  Pembrolizumab IV, 200 mg  Pemetrexed IV, 1230 mg (500 mg/m2)  Carboplatin IV,  mg  Cyanocobalamin IM,  mcg  Cyanocobalamin IM, 1000 mcg  Dexamethasone Oral, 4 mg bid  OHC Hydration Cycle Length: 1 Number Cycles: 1 Start: Percy Cordero on 2021 Assoc Dx: Non-small cell lung cancer (disorder) LOT: Toxicity Management 2021 C1 D1  Sodium Chloride IV 0.9 %,  mL, Dose modified  Cycle 4 of carboplatin/pemetrexed/pembrolizumab delivered 2021     Interval History  Follow-up scans were performed on November 15, 2021. There was evidence of progression in the liver and the intrathoracic lymph nodes.     Review of Systems  Constitutional:  No weight loss, No fever, No chills, No night sweats. Energy level good.   Eyes:  No impairment or change in vision  ENT / Mouth:  No pain, abnormal ulceration, bleeding, nasal drip or change in voice or hearing  Cardiovascular:  No chest pain, palpitations, new edema, or calf discomfort  Respiratory:  No pain, hemoptysis, change to breathing  Breast:  No pain, discharge, change in appearance or texture  Gastrointestinal:  No pain, cramping, jaundice, change to eating and bowel habits  Urinary:  No pain, bleeding or change in continence  Genitalia: No pain, bleeding or discharge  Musculoskeletal:  No redness, pain, edema or weakness  Skin:  No pruritus, rash, change to nodules or lesions  Neurologic:  No discomfort, change in mental status, speech, sensory or motor activity  Psychiatric:  No change in concentration or change to affect or mood  Endocrine:  No hot flashes, increased thirst, or change to urine production  Hematologic: No petechiae, ecchymosis or bleeding  Lymphatic:  No lymphadenopathy or lymphedema  Allergy / Immunologic:  No eczema, hives, frequent or recurrent infections        Vital Signs  Blood pressure: 100/64, R arm, Regular, Pulse: 88, Temperature: 97.9 F, Respirations: 16, O2 sat: , Pain Scale: 0, Height: 77 in, Weight: 235.4 lb, BSA: 2.41, BMI: 27.91 kg/m2     Physical Exam     CONSTITUTIONAL: awake, alert, cooperative, no apparent distress   EYES: pupils equal, round and reactive to light, sclera clear and conjunctiva normal  ENT: Normocephalic, without obvious abnormality, atraumatic  NECK: supple, symmetrical, no jugular venous distension and no carotid bruits   HEMATOLOGIC/LYMPHATIC: no cervical, supraclavicular or axillary lymphadenopathy   LUNGS: no increased work of breathing and clear to auscultation; Approximate 10 cm soft fleshy mass medial suprascapular region on the right  CARDIOVASCULAR: irregular rate and rhythm, normal S1 and S2, no murmur noted  ABDOMEN: normal bowel sounds x 4, soft, non-distended, non-tender, no masses palpated, no hepatosplenomegaly   MUSCULOSKELETAL: full range of motion noted, tone is normal  NEUROLOGIC: awake, alert, oriented to name, place and time. Motor skills grossly intact. SKIN: Normal skin color, texture, turgor and no jaundice.  appears intact   EXTREMITIES: Without cyanosis, clubbing, edema or asymmetry        Labs  CBC  Lab Results     11/18/2021      11/02/2021      10/28/2021      10/07/2021      09/16/2021      08/26/2021    CBC            WBC x 10^3/uL       9.2 (H) 6.1       5.3       4.3       4.0 (L)  4.8      RBC x 10^6/uL        2.83 (L)            3.35 (L)            3.16 (L)            3.64 (L)            3.92 (L)       4.57 (L)      HGB g/dL    9.4 (L)  10.8 (L)            10.1 (L)            11.3 (L)            11.9 (L)            13.5 (L)      HCT %        28.3 (L)            32.2 (L)            30.2 (L)            33.7 (L)            35.2 (L)            39.9 (L)      MCV fL        100.0 (H)         96.1 (H)           95.6 (H)           92.6 (H)           89.8     87.3      MCH pg       33.2 (H)           32.2     32.0     31.0     30.4     29.5 MCHC g/dL 33.2     33.5     33.4     33.5     33.8     33.8      RDW-CV, %            17.6 (H)           16.2 (H)           16.9 (H)           15.2 (H)           13.8     13.4      PLT x 10^3/uL         183.0   187.0   228.0   216.0   235.0   165.0      Venancio %         75.8 (H)           80.7 (H)           72.5 (H)           57.9     67.0     62.9      LY %           9.9 (L)  14.6 (L)            17.4 (L)            26.1     20.4 (L)            21.0 (L)      MO %          13.9 (H)           3.8 (L)  9.0       13.2 (H)           10.6     7.5      EO %           0.2 (L)  0.7 (L)  0.7 (L)  2.1       1.5       8.2 (H)      BA %           0.2       0.2       0.4       0.7       0.5       0.4      Venancio # (ANC) x 10^3/uL       6.95 (H)           4.91     3.87     2.46     2.66     3.00      LY # x 10^3/uL        0.91 (L)            0.89 (L)            0.93 (L)            1.11 (L)            0.81 (L)       1.00 (L)      MO # x 10^3/uL       1.28 (H)           0.23 (L)            0.48     0.56     0.42     0.36      EO # x 10^3/uL       0.02 (L)            0.04     0.04     0.09     0.06     0.39      BA # x 10^3/uL        0.02     0.01     0.02     0.03     0.02     0.02  CMP  Lab Results     11/18/2021      11/02/2021      10/28/2021      10/07/2021      09/16/2021      08/26/2021    Chemistries            Glucose mg/dL          106      105      109 (H)            84        144 (H)      BUN mg/dL   25 (H)  19        15        11        15      Creatinine mg/dL      1.30 (H)           1.24 (H)           1.10     1.14 (H)           1.27 (H)      BUN/Creatinine ratio             19.2     15.3     13.6     9.6       11.8      Sodium mmol/L         132 (L) 136      141      141      140      Potassium mmol/L    4.2       3.8       4.0       4.3       3.8      Chloride mmol/L        98        103      105      108 (H)            105      CO2 mmol/L              26.6     26.7     29.2     29.1     27.7      Anion gap, mmol/L 7.4       6.3       6.8       3.9 (L)  7.3      Calcium mg/dL          9.7       9.5       9.7       10.0     9.4      Albumin g/dL             3.9       3.9       3.7       3.8       3.9      Total protein g/dL      7.7       7.7       7.9       7.7       7.6      A/G ratio        1.0       1.0       0.9 (L)  1.0 (L)  1.1      Bilirubin, total mg/dL             0.8       0.8       0.8       0.9       1.0      Alkaline phosphatase U/L     99        86        74        61        61      AST/SGOT U/L         64 (H)  39 (H)  35 (H)  28        29      ALT/SGPT U/L          72 (H)  31        32        23        25      GFR non-African American, estimated mL/min/1.73m2      58.2 (L)            >60.0   >60.0   >60.0   59.8 (L)      GFR African American, estimated mL/min/1.73m2             >60.0   >60.0   >60.0   >60.0   >60.0      Cortisol, total, serum ug/dL   10.35          Imaging  August 25, 2021  CT of chest abdomen and pelvis with contrast     HISTORY: Squamous cell carcinoma with metastasis  COMPARISON: 7/2/2021  CONTRAST:  Oral and 80 mL Isovue-370 intravenous    TECHNIQUE: Individualized dose optimization technique was used in order to meet ALARA standards for radiation dose reduction. In addition to vendor specific dose reduction algorithms, the dose reduction techniques vary based on the specific scanner   utilized but frequently include automated exposure control, adjustment of the mA and/or kV according to patient size, and use of iterative reconstruction technique. COMMENTS:      Chest: Mild degenerative changes in the spine. Small sclerotic lesion anterior T11 and T12 vertebral bodies without change. Sclerotic metastasis in the left second anterior rib again noted. Stable perifissural 4 mm right middle lobe nodule image 3-58. A   few additional small stable pulmonary nodules are also noted. 7 x 6 mm nodule right lower lobe image 3-63 previously measured 5 x 4 mm.    3 mm nodule right lower lobe image 3-83 previously measured 2 mm. New 4 mm nodule left lower lobe image 3-54. Atelectasis/scarring and traction bronchiectasis again noted in the left upper lobe without change and compatible with radiation changes. Abdomen and pelvis: Stable sclerotic lesion in the left posterior iliac bone image 3-172. Bowel is unremarkable. No lymphadenopathy. Gallbladder is within normal limits. Diffuse enlargement of the left adrenal gland suggesting hyperplasia is without   change. Right adrenal gland, kidneys, spleen, and pancreas are unremarkable. There is a mild increase in size of several of the hypodense hepatic metastases. For example, 2.3 x 2.2 cm lesion in the right lobe on image 3-114 previously measured 1.9 x 1.6   cm. Bladder and prostate are unremarkable. Impression     Mild increase in size of 2 subcentimeter pulmonary nodules, with a new left lower lobe nodule. Multiple additional nodules are stable. Continued follow-up is indicated. Mild progression of size of hepatic metastases. Stable small osseous carotid foci as well as discrete metastatic lesion in the left second rib.        November 15, 2021  CT chest abdomen and pelvis with IV  HISTORY: Left upper lobe carcinoma  Scans from thoracic inlet to diaphragm and from diaphragm to pubic symphysis with oral and IV contrast as 80 mL Isovue-370. Comparison 8/25/2021. Up-to-date CT equipment with radiation dose reduction techniques. Chest-  Stable airspace disease with traction bronchiectasis in the left upper lobe, consistent with post irradiation change. No discrete mass in this region to indicate recurrent neoplasm. Suspected intrathoracic sathish metastases include-  Pretracheal-retrocaval node 1.1 x 1.9 cm (previously 0.4 x 0.8 cm), -series 601 image 42. AP window node 1.2 x 1.6 cm , new, image 38. Subcarinal lymph node 1.3 x 2.5 cm (previously 0.8 x 2.0 cm), image 47  Axillae and supraclavicular fossae unremarkable.   Pulmonary nodules include-  6 mm nodule right lower lobe is stable, image 59  3 mm nodule right middle lobe, stable image 55  New 6 mm nodule right lower lobe image 73  8 mm subpleural nodule left lower lobe (previously 4 mm), image 51  6 mm nodule left upper lobe (previously 4 mm), image 35  No suspicious osseous lesion. Thoracic aorta normal size. Central pulmonary arteries patent. Abdomen and pelvis-  Increase in size of multiple hepatic metastases. Index lesion in the right hepatic lobe measures 3.7 x 3.1 cm, image 98 (previously 1 x 1 cm. Another index lesion is located in the inferior right hepatic lobe measuring 4.3 x 4.6 cm, image 120 (previously 1.7 x 1.8 cm). Spleen, pancreas and gallbladder are unremarkable. Left adrenal mass measures 4.4 x 2.5 cm, relatively stable. Right adrenal normal. Both kidneys normal.  Large and small bowel loops of normal caliber. No pneumoperitoneum or ascites. No abdominal or pelvic lymphadenopathy. Urinary bladder normal. Mild prostatomegaly. No suspicious osteolytic or osteoblastic lesion  IMPRESSION:  Stable post irradiation changes left upper lobe  Progression of metastatic disease with at least 3 enlarging or new intrathoracic nodes, interval development of a couple new pulmonary metastases and significant increase in size and number of multiple hepatic metastases  Left adrenal metastasis is stable. Interpreted by:  Ayden Mcfadden MD  Signed by:  Ayden Mcfadden MD  11/15/21     [facility] - Patient Care Management     Pain, if applicable:         Performance Status: ECOG 0 Normal activity. Fully active, able to carry on all pre-disease performance without restriction. (Date: 11/02/2021)      Depression Status: Was screened;  Outcome positive: No; Screening Date: 07/08/2021; Screening Tool: Patient Health Questionnaire (PHQ9)     Psycho-social PHQ-9 Follow-up Plan (if applicable):     Assessment & Plan     Metastatic lung adenocarcinoma  - Liver and lung metastases  - EGFR L858R mutated  This is a 59-year-old gentleman with no smoking history who was incidentally found to have a left upper lobe lung mass with hilar and mediastinal adenopathy and PET scan evidence of distant metastasis involving bone and adrenal gland. Bronchoscopic biopsy yielded a diagnosis of lung adenocarcinoma. Unfortunately, radiology could not safely procure a biopsy of a metastatic site. They felt that biopsying one of the rib lesions would yield a rib fracture or perhaps a pneumothorax. They could not safely approach the adrenal lesion because of proximity to vascular structures. Therefore, the original bronchoscopic pathology specimen was sent for Klip.in molecular profiling on January 8, 2021. This molecular profiling demonstrated an activating EGFR mutation; EGFR-L858R. Reassuringly, the PSA was within normal limits. A brain MRI was negative for metastases. We began osimertinib in February 2021. Follow-up scans in May 2021 demonstrate evidence of progressive disease. This is somewhat puzzling as his pathology from his diagnostic biopsy at presentation demonstrated and EGFR L858R mutation. As a result, I will request another biopsy of a metastatic site. May 2021 Films reviewed with Dr. Patrizia Auguste of radiology who feels that the left adrenal mass is not amenable to biopsy. He also feels that the liver nodule will be difficult to access percutaneously. We are therefore left in the situation where we need to check another scan in a relatively short interval.  A 6-week follow-up scan in July 2021 demonstrated progression of liver nodules. I am therefore going to request biopsy of 1 of these nodules with molecular profiling. My impression is that we are dealing with 2 processes. The EGFR L858R process is responding to osimertinib as is demonstrated by regressive disease in the lungs. The puzzling aspect of his imaging is the growing nodules in the liver.   I suspect that this is a different process and that is why I requested a new biopsy. Biopsy confirmed recurrent metastatic lung adenocarcinoma. Repeat Caris profiling on the liver specimen Redemonstrated the EGFR L858R mutation. So I think we have to conclude that there is a subclone in the liver that is not responding to osimertinib. We therefore needed to change treatments. We switched to carboplatin/pemetrexed/pembrolizumab. He completed 4 cycles in October 2021. Follow-up scans showed further progression. At this point, I am puzzled by the clinical behavior of his cancer. He has not responded to targeted therapy and he has not responded to IO therapy based on current scans. The standard approach at this point would be single agent docetaxel. That is not a particularly attractive option in my view. Alternatively we are seeing pseudoprogression. My plan is to investigate possible clinical trials and in the meantime transition him to maintenance pembrolizumab/pemetrexed with a plan to repeat scans in 6 to 8 weeks.     Previous Caris profile positive for EGFR-L858R. He was started on osimertinib on 2/4/21. He had essentially no response to this and we therefore had to switch to cytotoxic chemotherapy with carboplatin/pemetrexed/pembrolizumab.     He has received his Covid booster and has received his influenza vaccination.     RTC 3 weeks  . Risk of Complications/Morbidity/Mortality High   Illness(es) present that pose threat to life/bodily function   Severe exacerbation of illness/side effects of treatment   Use of parenteral controlled substances   Therapy requiring intensive monitoring for toxicity        Time Based Itemization     A total of * minutes was spent on today's patient encounter.   If applicable, non-patient-facing activities:     (   )   Preparing to see the patient and reviewing records     (   )   Individual interpretation of results      (   )   Discussion or coordination of care with other health care professionals     (   )   Ordering of unique tests, medications, or procedures     (   )   Documentation within the EHR   . Recent imaging and labs were reviewed and discussed with the patient. I have recommended that the patient follow CDC guidelines for prevention of COVID-19 infection.     Mikael Vickers. Stephen Reeder MD, PhD  [facility], Medical Oncology  Co-Director of Clinical Research   for Breast, GI, Phase 1 and Molecular Profiling  [address] [facility] [address]  Phone: [phone]  Fax: [phone]  Online: [email or web address]      Note Recipients:   Fahad Mejia (Referring)  JOSE CABRERA MD     Electronically signed by Mikael Vickers.  Don VERA, PhD 11/18/2021 12:04 EST    Medications    Current Facility-Administered Medications: 0.9 % sodium chloride infusion, , IntraVENous, PRN  LORazepam (ATIVAN) injection 2 mg, 2 mg, IntraVENous, Once  levETIRAcetam (KEPPRA) 2,000 mg in sodium chloride 0.9 % 250 mL IVPB, 2,000 mg, IntraVENous, Q12H  0.9 % sodium chloride infusion, , IntraVENous, Continuous  sodium chloride flush 0.9 % injection 5-40 mL, 5-40 mL, IntraVENous, 2 times per day  sodium chloride flush 0.9 % injection 5-40 mL, 5-40 mL, IntraVENous, PRN  0.9 % sodium chloride infusion, 25 mL, IntraVENous, PRN  labetalol (NORMODYNE;TRANDATE) injection 10 mg, 10 mg, IntraVENous, Q4H PRN  pantoprazole (PROTONIX) injection 40 mg, 40 mg, IntraVENous, Daily  insulin lispro (1 Unit Dial) 0-6 Units, 0-6 Units, SubCUTAneous, TID WC  insulin lispro (1 Unit Dial) 0-3 Units, 0-3 Units, SubCUTAneous, Nightly  glucose (GLUTOSE) 40 % oral gel 15 g, 15 g, Oral, PRN  dextrose 50 % IV solution, 12.5 g, IntraVENous, PRN  glucagon (rDNA) injection 1 mg, 1 mg, IntraMUSCular, PRN  dextrose 5 % solution, 100 mL/hr, IntraVENous, PRN  perflutren lipid microspheres (DEFINITY) injection 1.65 mg, 1.5 mL, IntraVENous, ONCE PRN  acetaminophen (TYLENOL) tablet 650 mg, 650 mg, Oral, Q4H PRN  ondansetron (ZOFRAN-ODT) disintegrating tablet 4 mg, 4 mg, Oral, Q8H PRN **OR** ondansetron (ZOFRAN) injection 4 mg, 4 mg, IntraVENous, Q6H PRN  [Held by provider] losartan (COZAAR) tablet 100 mg, 100 mg, Oral, Daily  [Held by provider] amLODIPine (NORVASC) tablet 5 mg, 5 mg, Oral, Daily  [DISCONTINUED] dexamethasone (DECADRON) injection 10 mg, 10 mg, IntraVENous, Q6H **OR** dexamethasone (DECADRON) injection 4 mg, 4 mg, IntraVENous, Q6H  hydrALAZINE (APRESOLINE) injection 10 mg, 10 mg, IntraVENous, Q6H PRN    Allergies  Allergies   Allergen Reactions    Eggs Or Egg-Derived Products        Physical Exam  VITALS:  BP (!) 156/100   Pulse 99   Temp 98.9 °F (37.2 °C) (Axillary)   Resp 23   Ht 6' 4\" (1.93 m)   Wt 216 lb 7.9 oz (98.2 kg)   SpO2 100%   BMI 26.35 kg/m²   TEMPERATURE:  Current - Temp: 98.9 °F (37.2 °C); Max - Temp  Av.1 °F (37.3 °C)  Min: 98.5 °F (36.9 °C)  Max: 99.7 °F (37.6 °C)  PULSE OXIMETRY RANGE: SpO2  Av %  Min: 100 %  Max: 100 %  24HR INTAKE/OUTPUT:      Intake/Output Summary (Last 24 hours) at 12/3/2021 0724  Last data filed at 12/3/2021 0500  Gross per 24 hour   Intake 660.57 ml   Output 1155 ml   Net -494.43 ml       Physical Exam  Constitutional:       Appearance: He is ill-appearing. Eyes:      General: No scleral icterus. Cardiovascular:      Rate and Rhythm: Tachycardia present. Rhythm irregular. Heart sounds: No murmur heard. No gallop. Pulmonary:      Breath sounds: No wheezing, rhonchi or rales. Abdominal:      Palpations: Abdomen is soft. Tenderness: There is no abdominal tenderness. Musculoskeletal:         General: No swelling. Lymphadenopathy:      Cervical: No cervical adenopathy. Skin:     General: Skin is warm and dry. Findings: No rash.    Neurological:      Comments: Unresponsive         Labs  Recent Labs     21  0335 21  0335 21  0553 21  0530 21  0600   WBC 13.8*  --  20.4* 18.0*  --    HGB 7.6*   < > 8.4* 6.8* 6.7*   HCT 22.3*   < > 25.2* 20.0* 20.0*    --  327 189  --    .0*  --  102.0* 102.6*  --     < > = values in this interval not displayed. Recent Labs     12/02/21  0553 12/02/21  1229 12/03/21  0530    139 139   K 5.3* 4.3 4.4    103 104   CO2 18* 20* 22   PHOS  --  4.7  --    BUN 29* 31* 29*   CREATININE 1.3 1.3 1.1       Recent Labs     11/30/21  1550   AST 89*   ALT 98*   BILIDIR 0.5*   BILITOT 2.0*   ALKPHOS 155*       Recent Labs     12/02/21  1229   MG 1.90       Radiology  Echo Complete    Result Date: 12/2/2021  Transthoracic Echocardiography Report (TTE)  Demographics   Patient Name       Kelly Overton   Date of Study      12/02/2021        Gender              Male   Patient Number     [id number]        Date of Birth       1970   Visit Number       [id number]         Age                 46 year(s)   Accession Number   [id number]        Room Number         [id number]   Corporate ID       [id number]          Sonographer         Jv Craig RD   Ordering Physician Jos Davidson MD Interpreting        [facility]                                       Physician           Donavon Alvarez MD  Procedure Type of Study   TTE procedure:ECHOCARDIOGRAM COMPLETE 2D W DOPPLER W COLOR. Procedure Date Date: 12/02/2021 Start: 01:48 PM Study Location: [address] Echo Lab Technical Quality: Limited visualization due to patient immobility. Additional Indications:stroke. Patient Status: Routine Contrast Medium: Bubble Study. Amount - 10 ml Height: 76 inches Weight: 216 pounds BSA: 2.29 m2 BMI: 26.29 kg/m2 BP: 100/67 mmHg  Conclusions   Summary  Left ventricular cavity size is normal. There is mild-moderate concentric  left ventricular hypertrophy. Overall left ventricular systolic function  appears low normal with an estimated ejection fraction of 50-55%. Abnormal  (paradoxical) septal motion is present. Diastolic filling parameters  suggests normal diastolic function. No significant valvular regurgitation or stenosis. IVC size is normal (<2.1cm) and collapses > 50% with respiration consistent  with normal RA pressure (3mmHg). A bubble study was performed, and it was of  minimal diagnostic utility, but no obvious shunt seen. Consider MATI if clinically warranted. Signature   ------------------------------------------------------------------  Electronically signed by Vivian Dale MD (Interpreting  physician) on 12/02/2021 at 04:38 PM  ------------------------------------------------------------------   Findings   Left Ventricle  Left ventricular cavity size is normal. There is mild-moderate concentric  left ventricular hypertrophy. Overall left ventricular systolic function  appears low normal with an estimated ejection fraction of 50-55%. Abnormal  (paradoxical) septal motion is present. Diastolic filling parameters  suggests normal diastolic function. Mitral Valve  Mitral valve is minimally thickened. Trivial mitral regurgitation is  present. No evidence of mitral valve stenosis. Left Atrium  The left atrium appears normal in size. Aortic Valve  The aortic valve is structurally normal. The aortic valve appears tricuspid. Trace aortic regurgitation is present. No evidence of aortic valve stenosis. Aorta  The aortic root is minimally dilated, measuring 4.0 cm. Right Ventricle  The right ventricle is not well visualized. TAPSE measures: 2.03 cm. RV S  velocity measures: 12.1 cm/s. Tricuspid Valve  Tricuspid valve is structurally normal. Trivial tricuspid regurgitation. No  evidence of tricuspid stenosis. Right Atrium  The right atrial size appears normal.   Pulmonic Valve  The pulmonic valve is structurally normal. Trivial pulmonic regurgitation  present. No evidence of pulmonic valve stenosis. Pericardial Effusion  There is a trace circumferential pericardial effusion present.    Pleural Effusion  No pleural effusion. Miscellaneous  IVC size is normal (<2.1cm) and collapses > 50% with respiration consistent  with normal RA pressure (3mmHg). A bubble study was performed, and it was of  minimal diagnostic utility, but no obvious shunt seen. M-Mode/2D Measurements (cm)   LV Diastolic Dimension: 6.07 cm LV Systolic Dimension: 9.70 cm  LV Septum Diastolic: 0.55 cm  LV PW Diastolic: 9.60 cm        AO Root Dimension: 4 cm  RV Diastolic Dimension: 5.71 cm LA Dimension: 2.2 cm  Doppler Measurements   AV Peak Velocity: 111 cm/s     MV Peak E-Wave: 50.6 cm/s  AV Peak Gradient: 4.93 mmHg    MV Peak A-Wave: 83.1 cm/s                                 MV E/A Ratio: 0.61   E' Septal Velocity: 7.07 cm/s  E' Lateral Velocity: 12.7 cm/s MV Deceleration Time: 177 msec  E/E' ratio: 4  PV Peak Velocity: 140 cm/s  PV Peak Gradient: 7.84 mmHg   Aortic Valve   Peak Velocity: 111 cm/s  Peak Gradient: 4.93 mmHg  Aorta   Aortic Root: 4 cm      CT HEAD WO CONTRAST    Result Date: 12/2/2021  CT HEAD WO CONTRAST Indication: Change in mental status Technique: Helical CT images of the head were obtained without intravenous contrast media. Axial, coronal, and sagittal reformatted images were constructed from the raw data set. Up-to-date CT equipment and radiation dose reduction techniques were employed. Comparison: CT head dated 12/1/2021 Findings: Stable hemorrhagic mass in the left occipital lobe with surrounding vasogenic edema. Multiple additional hyperdense foci are present and compatible with findings on MRI. Some of the findings were not seen on the prior noncontrast CT. Calvarium is intact. Visualized paranasal sinuses are clear. Impression: Stable appearance of the brain since yesterday's MRI with multiple hemorrhagic masses. The largest in the left occipital lobe has not changed, measuring 3.2 x 2.8 cm.  Some subcentimeter hyperdense lesions (that were present on MRI) were not previously visualized on the CT from yesterday. CT HEAD WO CONTRAST    Result Date: 12/1/2021  EXAM: CT HEAD WO CONTRAST ON 12/1/2021 INDICATION: stroke COMPARISON: None. TECHNICAL: Axial CT imaging obtained from vertex to skull base. Axial images and multiplanar reformatted images reviewed. Dose modulation, iterative reconstruction and/or weight based adjustments of the mA/kV were utilized to reduce radiation dose to as low as reasonably achievable IV Contrast: None. LIMITATIONS: None FINDINGS: : No additional abnormality INTRACRANIAL HEMORRHAGE: There is a 3.1 cm hemorrhagic mass in the left occipital lobe unchanged in size and appearance with surrounding vasogenic edema. No new hemorrhage is visualized. VENTRICLES: There is partial effacement of the posterior horn of the left lateral ventricle. Otherwise the ventricles are normal in size and appearance. BRAIN PARENCHYMA: Other than the mass and vasogenic edema in the left occipital lobe parenchyma is normal in appearance. Punctate hyperdensities identified in the right frontal lobe are again identified as seen previously. SKULL: No destructive osseous process or fracture. PARANASAL SINUSES AND MASTOIDS: The visualized paranasal sinuses and mastoid air cells are clear. ORBITS: Normal as visualized. EXTRACRANIAL SOFT TISSUES: Unremarkable. 3 cm hemorrhagic mass in the left occipital lobe with surrounding vasogenic edema, unchanged. Punctate hyperdensity is seen and the base of the right frontal lobe similar to the prior study possibly metastatic. No new hemorrhage seen. CT Head WO Contrast    Result Date: 11/30/2021  EXAM: CT HEAD WO CONTRAST INDICATION: fall, AMS, anticoagulation; COMPARISON: MRI dated 2/10/2021. TECHNICAL: Axial CT imaging obtained from vertex to skull base. Axial images and multiplanar reformatted images reviewed. Up-to-date CT equipment and radiation dose reduction techniques were employed. IV Contrast: None.  FINDINGS: There is a 3.1 cm hemorrhagic mass in the left occipital lobe with extensive surrounding vasogenic edema and local mass effect. Partial effacement of left lateral ventricle on. There are additional subcentimeter hypoattenuating foci in bilateral frontal lobes and right temporal lobe. Punctate hyperdense focus in left frontal lobe (image 44). There is no hydrocephalus. Basal cisterns are patent. Calvarium is intact. Visualized paranasal sinuses and mastoid air cells are clear. 3.1 cm hemorrhagic mass in left occipital lobe with extensive surrounding vasogenic edema and local mass effect. Additional subcentimeter hypoattenuating foci in bilateral frontal and right temporal lobes. Imaging findings are concerning for metastasis. Recommended follow-up with contrast-enhanced MRI. XR CHEST PORTABLE    Result Date: 12/2/2021  Chest portable 1201 HISTORY: Check PICC position     Left arm PICC tip standardly positioned in the low SVC. Examination otherwise unchanged. XR CHEST PORTABLE    Result Date: 12/2/2021  Chest portable 11:15 HISTORY: PICC placement     Left arm PICC tip extends superiorly with the tip in the upper right brachiocephalic vein. Nonemergent repositioning is indicated. Lungs clear. Normal cardiac mediastinal silhouette. CTA HEAD NECK W CONTRAST    Result Date: 12/1/2021  PROCEDURE: CT ANGIOGRAPHY NECK WITH/WITHOUT CONTRAST INDICATION: Stroke on MRI COMPARISON: none TECHNIQUE: Limited noncontrast CT imaging performed for the purposes of IV contrast bolus tracking. Axial CT imaging obtained from skull base through the lung apices following administration of IV contrast. Axial images, multiplanar reformatted images, and maximum intensity projection images were reviewed for CT angiographic technique. Individualized dose optimization technique was used in order to meet ALARA standards for radiation dose reduction.   In addition to vendor specific dose reduction algorithms, the dose reduction techniques vary based on the specific scanner utilized but frequently include automated exposure control, adjustment of the mA and/or kV according to patient size, and use of iterative reconstruction technique. NASCET criteria used to determine percent stenosis measurements (% stenosis = (1-narrowest diameter/diameter of normal distal segment)x100). IV contrast: 80 mL Isovue-370. FINDINGS: AORTIC ARCH: Standard three-vessel aortic arch anatomy is present. INNOMINATE ARTERY: Normal. RIGHT SUBCLAVIAN ARTERY: Normal. RIGHT VERTEBRAL ARTERY: Normal. RIGHT CAROTID ARTERY: No significant stenosis or atherosclerotic change is seen in the right common or internal carotid artery. LEFT SUBCLAVIAN ARTERY: Normal. LEFT VERTEBRAL ARTERY: Normal. LEFT CAROTID ARTERY: No significant stenosis or atherosclerotic change is seen in the left common or internal carotid artery. NECK SOFT TISSUES: No neck soft tissue mass or lymphadenopathy. VISUALIZED MEDIASTINUM: Mediastinal adenopathy is seen. This was recently evaluated on dedicated chest CT. VISUALIZED LUNG APICES: Airspace disease in left lung apex appears similar to recent dedicated chest CT. BONES: No destructive osseous process. OTHER: None. Unremarkable CTA of the neck with no significant vascular pathology identified. PROCEDURE: CT ANGIOGRAPHY HEAD WITH/WITHOUT CONTRAST INDICATION: Stroke on MRI COMPARISON: none TECHNIQUE: Axial CT imaging obtained through the head prior to and following administration of IV contrast. Axial images, multiplanar reformatted images, and maximum intensity projection images were reviewed for CT angiographic technique. IV contrast: 80 mL Isovue-370. FINDINGS: ANTERIOR CIRCULATION: There is diffuse irregularity with mild areas of stenosis seen within the needle cerebral arteries bilaterally. This is more prominent on the left than the right. However, there is no definite occlusion.  There is also irregularity and narrowing seen involving the bilateral A1 segments without evidence of resulting occlusion. POSTERIOR CIRCULATION: The bilateral vertebral arteries, basilar artery and posterior cerebral arteries demonstrate no occlusion or stenosis. No evidence for aneurysm or arteriovenous malformation. INTRACRANIAL VENOUS SYSTEM: No evidence for intracranial venous thrombosis. Please refer to the MRI of the brain from earlier the same day for additional results. . OTHER: None. IMPRESSION: There is areas of diffuse irregularity and narrowing seen in the middle cerebral arteries bilaterally, left greater than right. There is also irregularity and narrowing of the bilateral A1 segments but no definite evidence of flow significant stenosis with normal enhancement seen distally. Extensive changes related to metastatic disease are noted and better evaluated on the MRI from earlier the same day. CT CHEST ABDOMEN PELVIS W CONTRAST    Result Date: 11/15/2021  CT chest abdomen and pelvis with IV HISTORY: Left upper lobe carcinoma Scans from thoracic inlet to diaphragm and from diaphragm to pubic symphysis with oral and IV contrast as 80 mL Isovue-370. Comparison 8/25/2021. Up-to-date CT equipment with radiation dose reduction techniques. Chest- Stable airspace disease with traction bronchiectasis in the left upper lobe, consistent with post irradiation change. No discrete mass in this region to indicate recurrent neoplasm. Suspected intrathoracic sathish metastases include- Pretracheal-retrocaval node 1.1 x 1.9 cm (previously 0.4 x 0.8 cm), -series 601 image 42. AP window node 1.2 x 1.6 cm , new, image 38. Subcarinal lymph node  1.3 x 2.5 cm (previously 0.8 x 2.0 cm), image 47 Axillae and supraclavicular fossae unremarkable.  Pulmonary nodules include- 6 mm nodule right lower lobe is stable, image 59 3 mm nodule right middle lobe, stable image 55 New 6 mm nodule right lower lobe image 73 8 mm subpleural nodule left lower lobe (previously 4 mm), image 51 6 mm nodule left upper lobe (previously 4 mm), image 35 No suspicious osseous lesion. Thoracic aorta normal size. Central pulmonary arteries patent. Abdomen and pelvis- Increase in size of multiple hepatic metastases. Index lesion in the right hepatic lobe measures 3.7 x 3.1 cm, image 98 (previously 1 x 1 cm. Another index lesion is located in the inferior right hepatic lobe measuring 4.3 x 4.6 cm, image 120 (previously 1.7 x 1.8 cm). Spleen, pancreas and gallbladder are unremarkable. Left adrenal mass measures 4.4 x 2.5 cm, relatively stable. Right adrenal normal. Both kidneys normal. Large and small bowel loops of normal caliber. No pneumoperitoneum or ascites. No abdominal or pelvic lymphadenopathy. Urinary bladder normal. Mild prostatomegaly. No suspicious osteolytic or osteoblastic lesion     Stable post irradiation changes left upper lobe Progression of metastatic disease with at least 3 enlarging or new intrathoracic nodes, interval development of a couple new pulmonary metastases and significant increase in size and number of multiple hepatic metastases Left adrenal metastasis is stable. MRI BRAIN W WO CONTRAST    Result Date: 12/1/2021  EXAM: MRI BRAIN W WO CONTRAST INDICATION:Brain met abnormal head CT. Left upper lung cancer. COMPARISON: Head CT 11/30/2021 and MRI the brain 2/10/2021. TECHNIQUE: Multiplanar, multisequence MR imaging of the head obtained with and without IV contrast. IV contrast: 20 mL FINDINGS: SINUSES AND OSSEOUS STRUCTURES: The mastoid air cells and middle ears are clear. The paranasal sinuses are clear. Marrow signal is unremarkable. ORBITS: Unremarkable MIDLINE STRUCTURES: The sella turcica and pituitary gland are normal. Craniovertebral alignment and cerebellar tonsils are normal. VENTRICLES: Normal size and configuration for patient age. Cisternal spaces are intact. INTRACRANIAL HEMORRHAGE: Large intraparenchymal hemorrhage in the left occipital region associated with enhancement compatible with a hemorrhagic metastasis.  No additional lobe image 111. *  Large enhancing hemorrhagic lesion left occipital lobe abutting the tentorium and falx measuring approximately 32 x 29 x 32 mm. Diffusion weighted images demonstrate numerous punctate diffusion signal abnormalities some of which are associated with enhancing lesions compatible with metastatic disease. Others demonstrate no underlying enhancing lesion and suggests small embolic infarcts. There is a focal signal abnormality in the posterior right temporal and lateral right occipital region which demonstrates diffusion restriction, FLAIR signal abnormality and no definite enhancing lesions suspicious for small infarct. Similar finding in the medial right temporal lobe diffusion-weighted image 40. There is mild mass effect and moderate vasogenic edema associated with the left occipital hemorrhagic metastasis. No significant mass effect otherwise identified. No midline shift. No evidence of hydrocephalus. 1. At least 25 enhancing lesions compatible with extensive intracranial metastatic disease. Many lesions are very small and subtle. Large hemorrhagic lesion in the left occipital lobe with enhancement compatible with hemorrhagic metastasis. 2. Multiple tiny diffusion restriction abnormalities without associated enhancing lesion compatible with multiple small infarcts. Additional larger infarcts suspected in the posterior right temporal and lateral right occipital region and in the medial right temporal lobe. 3. Vasogenic edema associated with the left occipital lobe lesion with mild mass effect upon the posterior horn of the left lateral ventricle. No evidence of hydrocephalus or significant mass effect otherwise.       Pathology  N/A    Problem List  Patient Active Problem List   Diagnosis    Paroxysmal atrial fibrillation (HCC)    Essential hypertension    Brain mass    Malignant neoplasm of upper lobe bronchus, left ([address]    Brain metastases ([address]    ICH (intracerebral hemorrhage) ([address]  Cerebrovascular accident (CVA) due to embolism of posterior cerebral artery (HCC)    Encephalopathy acute    Altered mental status    Encounter for palliative care       Assessment and Plan:     Metastatic lung adenocarcinoma  - Left upper lobe primary  - Metastases involving bone, liver, brain  - Lifelong non-smoker  - Caris Molecular profiling demonstrating EGFR L858R mutation. Additional pathogenic mutations in MYD 88, PIK3CA, TP53, and a MET Variant of Unknown Significance. - Primary refractory to treatment with osimertinib  - Primary refractory to treatment with combination chemo-immunotherapy utilizing carboplatin/Alimta/pembrolizumab. - Evidence of progression in the liver and lungs by CT November 15, 2021. - New brain metastases as above.     - From a medical oncology perspective, systemic treatment options include single agent chemotherapy such as Taxotere, Gemzar, Navelbine vs clinical trial participation vs best supportive care. I have sent his Caris molecular profile report to Dr. Shane Patel at the drug development unit at [facility] in [place] to determine if he is an eligible candidate for a phase 1 clinical trial. 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 His clinical deterioration has been marked and rapid. He would need to make a marked improvement in order to be an appropriate candidate for further systemic therapy for his metastatic lung cancer. Seizures  - Secondary to brain mets, hemorrhage, infarcts  - Receiving Steroids and Keppra    Multiple Cerebral Infarcts  - History of a-fib treated with Xarelto and Norvasc  - Anticoagulation is on hold due to hemorrhagic brain mets  -Reviewed most recent head CT and MRI with neurosurgery. He continues to have strokes. Echocardiogram did not demonstrate PFO, thrombus or valvular abnormalities. Etiology of strokes not obvious. I do suspect he is hypercoagulable due to his underlying malignancy. Atrial Fibrillation  - Rate control with amlodipine. Xarelto on hold due to hemorrhagic brain mets. HTN  - Receiving home losartan and amlodipine    Prognosis poor. Hospice appropriate.     Rudy Woodall MD  12/3/2021

## 2021-12-03 NOTE — DISCHARGE SUMMARY
INTERNAL MEDICINE DEPARTMENT AT [address] [place] [address]  DISCHARGE SUMMARY    Patient ID: Raleigh Estrella                                             Discharge Date: 12/3/2021   Patient's PCP: Leslie Velazquez MD                                          Discharge Physician: Ruthy Carroll MD   Admit Date: 11/30/2021   Admitting Physician: Milli Cano MD    PROBLEMS DURING HOSPITALIZATION:  Present on Admission:   Malignant neoplasm of upper lobe bronchus, left ([address]   Brain metastases ([address]   Paroxysmal atrial fibrillation ([address]   Essential hypertension   ICH (intracerebral hemorrhage) ([address]   Cerebrovascular accident (CVA) due to embolism of posterior cerebral artery ([address]   Encephalopathy acute   Altered mental status   Encounter for palliative care      DISCHARGE DIAGNOSES:  Mr. Padmaja Arcos is a 45 yo male with PMH afib on xarelto and stage IVb pulmonary adenocarcinoma who presented with AMS after fall subsequently found to have hemorrhagic brain metastases. Patient was diagnosed with pulmonary adenocarcinoma in 2020 that has metastasized despite treatment at [facility]. He is currently on pembrolizumab, pemetrexed, carboplatin. Cancer has metastasized to adrenal glands, liver, lungs and now brain. CT head demonstrated a 3.1 cm hemorrhagic mass in L occipital lobe with extensive surrounding vasogenic edema and local mass effect. MRI revealed an additional 25 metastatic lesions. Patient had seizure and was started on 2g Keppra BID and continuous EEG. Patient did not reseize after increasing AED dose to 2g Keppra BID. Heme-onc, neurosurgery, radiation oncology and neurology were all consulted. The consensus was that neurosurgery to remove the L occipital lobe hemorrhagic metastasis would not provide much benefit in terms of quality of life for the patient. From heme-onc perspective, patient would not be candidate for single agent therapy until mental status improves.  Patient could potentially benefit from whole brain radiation, however. Palliative care was involved during the patient's hospital course as well. The patient's wife, Edith Hernandez, was ultimately concerned with maximizing her 's quality of life for the time that he has left. She does not want him to be in pain and does not want to subject him to any surgeries or interventions if they will not improve his quality of life. The wife decided to pursue hospice care for her  given that the potential interventions would only provide questionable benefit. Unfortunately, his wife moved here from [facility] two years ago. She has no family or friends. This is a very sad and unfortunate outcome for this couple.      Physical Exam:  BP (!) 156/100   Pulse 99   Temp 98.7 °F (37.1 °C) (Axillary)   Resp 23   Ht 6' 4\" (1.93 m)   Wt 216 lb 7.9 oz (98.2 kg)   SpO2 100%   BMI 26.35 kg/m²       Consults: neurosurgery, neurology, palliative, rad-onc, heme-onc  Significant Diagnostic Studies:  Ct Head, brain MRI   Treatments: antieplipetic agents   Disposition: hospice   Discharged Condition: Stable  Follow Up: None needed     DISCHARGE MEDICATION:     Medication List      STOP taking these medications    amLODIPine 5 MG tablet  Commonly known as: Norvasc     chlorthalidone 25 MG tablet  Commonly known as: HYGROTON     Dextromethorphan-guaiFENesin  MG Caps  Commonly known as: Coricidin HBP Congestion/Cough     LORazepam 1 MG tablet  Commonly known as: ATIVAN     losartan 100 MG tablet  Commonly known as: COZAAR     rivaroxaban 20 MG Tabs tablet  Commonly known as: XARELTO          Activity: activity as tolerated  Diet: regular diet  Wound Care: none needed     Time Spent on discharge is more than 30 minutes    Signed:  Erin Miller MD,  MD, PGY1  12/3/2021

## 2021-12-03 NOTE — CARE COORDINATION
Case Management Assessment           Daily Note                 Date/ Time of Note: 12/3/2021 10:39 AM         Note completed by: Maryana Seth RN    Patient Name: Carmen Ndiaye  YOB: 1970    Diagnosis:Brain mass [G93.89]  Altered mental status, unspecified altered mental status type [R41.82]  Patient Admission Status: Inpatient    Date of Admission:11/30/2021  2:54 PM Length of Stay: 3 GLOS: GMLOS: 3.8    Current Plan of Care: Chemo vs Hospice, PC following, MRI  ________________________________________________________________________________________  PT AM-PAC:   / 24 per last evaluation on: tbd    OT AM-PAC:   / 24 per last evaluation on: tbd    DME Needs for discharge: tbd  ________________________________________________________________________________________  Discharge Plan: To Be Determined DUE TO: Placement vs hospice    Tentative discharge date: tbd    Current barriers to discharge: Chemo, MRI, possible hospice    Referrals completed: Not Applicable    Resources/ information provided: Not indicated at this time  ________________________________________________________________________________________  Case Management Notes:Patient being followed by Palliative Care, consider hospice consult. Patient down for MRI at this time. Patient considering chemo trial.  From home with wife. 1100 recd message from [facility] that family has elected hospice through [facility]. Referral called. Phyllistine Lias and his family were provided with choice of provider; he and his family are in agreement with the discharge plan.     Care Transition Patient: Breanne Seth RN  [facility]  Case Management Department  Ph: [phone]

## 2021-12-03 NOTE — PROGRESS NOTES
Neurosurgery Progress Note    Patient seen and examined on 12/02/21. Patient with significant change in mental status this morning. Noted to be tachycardic and hypertensive. Neurologically unresponsive, GCS E-1 V-1 M-4. Concern for seizure though patient appeared to be protecting airway. Transferred to ICU with stat CT obtained. Ativan administered with questionable shaking. Keppra increased and neurocritical care following. A/P: 45 yo man with numerous intracranial metastases, dominant left occipital hemorrhagic lesion    -Transfer to ICU for close monitoring  -HOB elevated  -Frequent neuro checks  -Monitor need for intubation  -cEEG, seizure management per NCC  -Decadron, PPI, SSI  -Rad Onc/Heme onc/Palliative care consultations  -Discussed patient at length with Dr. Dionicio Kaye and subsequently with patient's wife. Patient with poor response to chemotherapy to date and uncontrolled systemic disease. Unclear cause for rapid decline in neurologic function but may be related to seizure. At this point, it seems unlikely that surgical intervention would help improve functional status/quality of life unless dominant metastatic lesion appears to be source of persistent seizures on cEEG. As surgery would concurrently delay treatment for the patient's > 20 additional metastases and would induce a VF deficit, would recommend against surgical intervention without a clear intervention.   -Though exam is poor, explained to patient's family that CT remains stable and that this may be a reflection of a post-ictal state.  Would recommend allowing patient to recover from seizure episodes to see if can regain consciousness at which time he may be a candidate for radiation therapy.   -Will continue to follow      Tacho Bunn MD, PhD  [address], [address] [place], [place], [address]  [phone] (c), [phone] (o)

## 2021-12-03 NOTE — PROGRESS NOTES
[facility]:  Met with pt's wife and she would like hospice services for him. Pt will go to the [address] [place] [address] today. [facility] will transport him at 4 pm.      CM and RN updated. Discharge order in place. Thank you for allowing us to participate in the care of this patient and family.     [address] [phone]

## 2021-12-03 NOTE — PROGRESS NOTES
NEUROSURGERY PROGRESS NOTE    12/3/2021 7:53 AM                               Abelino Courtney                      LOS: 3 days     Subjective: Patient laying in bed upon entering the room. Patient was felt to have a change in pupil size overnight by RN and repeat CT Head ordered. Patient had no witnessed seizure activity, but when assessing patient prior to CT he had posturing with any noxious tactile stimuli. Physical Exam:  Patient seen and examined    Vitals:    12/03/21 0630   BP: (!) 156/100   Pulse: 99   Resp: 23   Temp:    SpO2:      GCS:  1 - Does not open eyes  1 - Makes no noise  2 - Extensor response (decerebrate)    General: Ill appearing, encephalopathic  HENT: atraumatic, neck supple  Eyes: Optic discs: Not tested  Pulmonary: unlabored respiratory effort  Cardiovascular:  Warm well perfused.  No peripheral edema  Gastrointestinal: abdomen soft, NT, ND     Neurological:  Mental Status: Encephalopathic  Attention: Exam limited 2/2 encephalopathy  Language: Exam limited 2/2 encephalopathy  Sensation: Moves all extremities to noxious tactile stimuli  Coordination: Exam limited 2/2 encephalopathy     Cranial Nerves:  II: Visual acuity not tested, denies new visual changes / diplopia  III, IV, VI: PERRL, Left 5 mm and Right 3 mm, EOM exam limited 2/2 encephalopathy  V: Facial sensation exam limited 2/2 encephalopathy  VII: Face symmetric  VIII: Hearing exam limited 2/2 encephalopathy  IX: Palate movement exam limited 2/2 encephalopathy  XI: Shoulder shrug exam limited 2/2 encephalopathy  XII: Tongue midline     Musculoskeletal:   Gait: Not tested   Assist devices: None   Tone: Normal at rest, but Increased tone with stimulation  Motor strength: Exam limited 2/2 encephalopathy but extends all extremities with noxious tactile stimuli    Radiological Findings:  CT Head WO Contrast  Result Date: 11/30/2021  3.1 cm hemorrhagic mass in left occipital lobe with extensive surrounding vasogenic edema and local mass effect. Additional subcentimeter hypoattenuating foci in bilateral frontal and right temporal lobes. Imaging findings are concerning for metastasis. Recommended follow-up with contrast-enhanced MRI. MRI BRAIN W WO CONTRAST  Result Date: 12/1/2021  1. At least 25 enhancing lesions compatible with extensive intracranial metastatic disease. Many lesions are very small and subtle. Large hemorrhagic lesion in the left occipital lobe with enhancement compatible with hemorrhagic metastasis. 2. Multiple tiny diffusion restriction abnormalities without associated enhancing lesion compatible with multiple small infarcts. Additional larger infarcts suspected in the posterior right temporal and lateral right occipital region and in the medial right temporal lobe. 3. Vasogenic edema associated with the left occipital lobe lesion with mild mass effect upon the posterior horn of the left lateral ventricle. No evidence of hydrocephalus or significant mass effect otherwise. CT HEAD WO CONTRAST  Result Date: 12/1/2021  3 cm hemorrhagic mass in the left occipital lobe with surrounding vasogenic edema, unchanged. Punctate hyperdensity is seen and the base of the right frontal lobe similar to the prior study possibly metastatic. No new hemorrhage seen. CTA HEAD NECK W CONTRAST  Result Date: 12/1/2021  There is areas of diffuse irregularity and narrowing seen in the middle cerebral arteries bilaterally, left greater than right. There is also irregularity and narrowing of the bilateral A1 segments but no definite evidence of flow significant stenosis with normal enhancement seen distally. Extensive changes related to metastatic disease are noted and better evaluated on the MRI from earlier the same day. CT HEAD WO CONTRAST  Result Date: 12/2/2021  Stable appearance of the brain since yesterday's MRI with multiple hemorrhagic masses. The largest in the left occipital lobe has not changed, measuring 3.2 x 2.8 cm.  Some subcentimeter hyperdense lesions (that were present on MRI) were not previously visualized on the CT from yesterday. CT HEAD WO CONTRAST  Result Date: 12/3/2021  No new intracranial hemorrhage. Increased vasogenic edema of the left frontal lobe from the prior study of 12/2/2021. Labs:  Recent Labs     12/03/21  0530 12/03/21  0530 12/03/21  0600   WBC 18.0*  --   --    HGB 6.8*   < > 6.7*   HCT 20.0*   < > 20.0*     --   --     < > = values in this interval not displayed. Recent Labs     12/02/21  1229 12/02/21  1229 12/03/21  0530      < > 139   K 4.3   < > 4.4      < > 104   CO2 20*   < > 22   BUN 31*   < > 29*   CREATININE 1.3   < > 1.1   GLUCOSE 136*   < > 134*   CALCIUM 9.6   < > 9.1   PHOS 4.7  --   --    MG 1.90  --   --     < > = values in this interval not displayed. Recent Labs     11/30/21  1550   PROTIME 19.4*   INR 1.68*       Patient Active Problem List    Diagnosis Date Noted    Malignant neoplasm of upper lobe bronchus, left ([address] 12/01/2021    Brain metastases ([address] 12/01/2021    ICH (intracerebral hemorrhage) ([address] 12/01/2021    Cerebrovascular accident (CVA) due to embolism of posterior cerebral artery (HCC)     Encephalopathy acute     Altered mental status     Encounter for palliative care     Brain mass 11/30/2021    Paroxysmal atrial fibrillation ([address] 01/17/2020    Essential hypertension 01/17/2020     Assessment:  Patient is a 46 y.o. male w/AMS, nausea and vomiting. CT Head revealed 3.1 cm hemorrhagic mass in left occipital lobe with extensive surrounding vasogenic edema, brain compression and local mass effect. Patient's spouse educated from [employer] to Neurosurgery\" book pp. 5-7, 25-27, 26-40     Plan:  1. Neurologic exams frequency: Q4H  2. For change in exam MUST contact neurosurgery team along with critical care or primary team  3.  Brain Mass:  - MRI Brain shows numerous mets  - Radiation Oncology following, appreciate recs  -

## 2021-12-03 NOTE — PROGRESS NOTES
Palliative Medicine Progress Note    Admit Date: 11/30/2021  Hospital Day:  Hospital Day: 4     CC: AMS  HPI: 55M PMH lung adenocarcinoma (EGFR+) w/ liver/bone/adrenal/brain mets s/p radiation & chemo (osimertinib, pembrolizumab/pemetrexed/carboplatin), afib (Xarelto), and HTN (last chart /67) who presented 11/30/21 w/ fall & AMS. CT head showed 3.1 cm hemorrhagic mass in L occipital lobe with extensive surrounding vasogenic edema and local mass effect with additional subcentimeter hypoattenuating foci in bilateral frontal and right temporal lobes concerning for metastases. Pt's hgb decreased to 6.7 this AM, head CT with no new ICH or expansion of existing ICH but did show increased vasogenic edema in L frontal lobe. MRI performed this morning. Recommendations:     D/w Dr. Brandon Chan. Spoke with pt's wife Zach Almanzar at the bedside. Zach Almanzar does not want pursue surgery or whole brain radiation, at this time she requests to focus on keeping pt comfortable. Discussed enrolling the pt in hospice. She requests a referral to [address] [place] [address] with preference for their [facility]. D/w CM MORGAN Aceves and Dr. Ricardo Medley. 1. Goals of Care/Advanced Care planning/Code status: [facility][1], discussed in depth today. Goals are comfort directed. 2. Pain: No signs of distress at this time. 3. SOB: Pt appears with slightly increased WOB, on 2L NC. Ordered morphine 2-4mg IV q2h prn and discussed with pt's wife. 4. AMS: pt presented with confusion after a fall. He was apparently going into other people's homes and dressing himself incorrectly. CT head showed 3.1 cm hemorrhagic mass in L occipital lobe with extensive surrounding vasogenic edema and local mass effect with additional subcentimeter hypoattenuating foci in bilateral frontal and right temporal lobes concerning for metastases. Pt had seizure yesterday and was transferred back to the ICU, seizure likely 2/2 mets.    5. Disposition: Hospice, likely [facility]      Subjective:     Scheduled Meds:   heparin (porcine)  5,000 Units SubCUTAneous 3 times per day    pantoprazole  40 mg IntraVENous BID    scopolamine  1 patch TransDERmal Q72H    LORazepam  2 mg IntraVENous Once    levetiracetam  2,000 mg IntraVENous Q12H    sodium chloride flush  5-40 mL IntraVENous 2 times per day    insulin lispro  0-6 Units SubCUTAneous TID WC    insulin lispro  0-3 Units SubCUTAneous Nightly    [Held by provider] losartan  100 mg Oral Daily    [Held by provider] amLODIPine  5 mg Oral Daily    dexamethasone  4 mg IntraVENous Q6H       Continuous Infusions:   sodium chloride      sodium chloride 50 mL/hr at 12/03/21 0641    sodium chloride      dextrose         PRN Meds:sodium chloride, morphine **OR** morphine, sodium chloride flush, sodium chloride, labetalol, glucose, dextrose, glucagon (rDNA), dextrose, perflutren lipid microspheres, acetaminophen, ondansetron **OR** ondansetron, hydrALAZINE    Review of Systems. Review of Systems - History obtained from unobtainable from patient due to mental status    Performance status  30      Objective:     Patient Vitals for the past 8 hrs:   BP Temp Temp src Pulse Resp SpO2   12/03/21 0916  98.7 °F (37.1 °C) Axillary      12/03/21 0630 (!) 156/100   99 23    12/03/21 0600 (!) 160/102   99 28    12/03/21 0530 (!) 163/98   103 21    12/03/21 0500 (!) 156/106   90 26 100 %   12/03/21 0430 (!) 155/101 98.9 °F (37.2 °C) Axillary 110 29    12/03/21 0400 (!) 160/99   112 21 100 %     I/O last 3 completed shifts: In: 660.6 [I.V.:614.2; IV Piggyback:46.4]  Out: 0816 [Urine:1155]  I/O this shift:  In: -   Out: 60 [Urine:60]    Physical Exam  Constitutional:       Appearance: He is ill-appearing. Cardiovascular:      Rate and Rhythm: Normal rate and regular rhythm. Heart sounds: Normal heart sounds.    Pulmonary:      Comments: Increased WOB  Abdominal:      General: Bowel sounds are normal.      Palpations: Abdomen is soft. Musculoskeletal:      Right lower leg: No edema. Left lower leg: No edema. Skin:     General: Skin is warm and dry. Neurological:      Comments: obtunded          WBC/Hgb/Hct/Plts:  --/6.7/20.0/-- (12/03 0600)           Assessment:     Active Problems:    Paroxysmal atrial fibrillation (HCC)    Essential hypertension    Malignant neoplasm of upper lobe bronchus, left (HCC)    Brain metastases (HCC)    ICH (intracerebral hemorrhage) (HCC)    Cerebrovascular accident (CVA) due to embolism of posterior cerebral artery (HCC)    Encephalopathy acute    Altered mental status    Encounter for palliative care  Resolved Problems:    * No resolved hospital problems. *      Time spent with patient and/or family: 21  Time reviewing records: 5  Time communicating with providers: 5    A total of 30 minutes spent with the patient and family on unit greater than 50% face to face time in counseling regarding palliative care and goals of care for the patient.          [address] [facility]  Inpatient Palliative Care  [phone]

## 2021-12-07 LAB
BLOOD BANK DISPENSE STATUS: NORMAL
BLOOD BANK DISPENSE STATUS: NORMAL
BLOOD BANK PRODUCT CODE: NORMAL
BLOOD BANK PRODUCT CODE: NORMAL
BPU ID: NORMAL
BPU ID: NORMAL
DESCRIPTION BLOOD BANK: NORMAL
DESCRIPTION BLOOD BANK: NORMAL

## 2023-10-03 NOTE — TELEPHONE ENCOUNTER
"Individual Psychotherapy (PhD/LCSW)    10/3/2023    Site:  [place]         Therapeutic Intervention: Met with patient.  Outpatient - Insight oriented psychotherapy 30 min - CPT code 16696  Virtual visit with synchronous audio and video     Each patient to whom he provides medical services by telemedicine is:  (1) informed of the relationship between the physician and patient and the respective role of any other health care provider with respect to management of the patient; and (2) notified that he or she may decline to receive medical services by telemedicine and may withdraw from such care at any time.      Location:  His work in [place]    Chief complaint/reason for encounter: anxiety     Interval history and content of current session: The patient logs on late to the initial session due to anxiety and ADHD.  He works at [employer] as a .  He was referred by Dr. Garcia.  He was in [place] at a different company for two years.  He takes the Vyvanse she he knows it will be a hectic day and he needs to concentrate.  When he takes the medicine, he does not eat.  He was diagnosed with ADHD in the seventh grade.  He grew up in [place].  He graduated from [employer].  He went to [employer] for three years.  He was not doing "what he was supposed" to be doing.  He went to [employer].  He has an associates in instrumentation and control in May 2022.  He is now the supervisor.  It is stressful.  It is hard to find people that want to work.  He lives with his girlfriend, Jennifer.  They made three years.  She works for [employer] sitting in people's homes.  She is subbing at school.  She is 23.  He met her online.  She is from [place].  She has a bachelors degree in psychology from [employer].  Emphasize that he has had three major changes in six months.  Explore how he is caring for himself.  Encourage working to improve sleep, nutrition, and physical activity.  " MADHURIM stating that as of 5/15, the hospital is allowing overnight stays following afternoon procedures. Because of this, we do not need to reschedule his ablation. However, we do need him to be tested for COVID-19 on 5/22 now (sooner than originally told on 5/26). Also, he has not yet scheduled his CTA. Asked pt to call back to make sure we are on the same page about procedure and testing. His mindset has been affecting them.  He has been worrying a lot.  He has been on edge.  He is worried about finances.  His parents now live in [place].  His step father had a job in [place].  His mom does health insurance enrolling.  His natural father is in [place].  He is retired.  He worked for [employer].  His parents were never .  His father started calling more when he was a senior in high school.  He works 6-4:30 Mon through Thursday.  On his three day weekends.  He has a new dog, a cocker spaniel.  He is [age] year old.  He will stay home or go to visit family.  When he is stressed, he keeps it inside.  He had a good childhood.  His mom and step father got along well.  He moved to [place] in December.  He also moved in with his girlfriend.  He started a new job at the same time. He is the youngest of four children.  He is the only boy.  He used to run, and lift weights.  He has not done that in about ten months.  He has not been in counseling.  He will play video games in the evening.  He was raised Christian.       Treatment plan:  Target symptoms: anxiety   Why chosen therapy is appropriate versus another modality: relevant to diagnosis  Outcome monitoring methods: self-report, observation  Therapeutic intervention type: insight oriented psychotherapy, supportive psychotherapy, interactive psychotherapy    Risk parameters:  Patient reports no suicidal ideation  Patient reports no homicidal ideation  Patient reports no self-injurious behavior  Patient reports no violent behavior    Verbal deficits: None    Patient's response to intervention:  The patient's response to intervention is accepting, motivated.    Progress toward goals and other mental status changes:  The patient's progress toward goals is fair .    Diagnosis:     ICD-10-CM ICD-9-CM   1. Attention deficit disorder, unspecified hyperactivity presence  F98.8 314.00   2.      Adjustment disorder with mixed emotions    Plan:  individual  psychotherapy and medication management by physician  Will refer to prescriber for med eval for ADHD    Return to clinic: as scheduled    Length of Service (minutes): 30

## (undated) DEVICE — WANG TRANSBRONCHIAL HISTOLOGY NEEDLE FOR CENTRAL REGION, 1.9 MM X 130 CM: Brand: WANG

## (undated) DEVICE — NEEDLE ASPIR 19GA HISTOLOGY FLX VIZISHOT 2

## (undated) DEVICE — FORCEPS BX L100CM DIA1.8MM WRK CHN 2MM PULM S STL RAD JAW 4

## (undated) DEVICE — Z DISCONTINUED USE 2749457 TUBING SAMP AD W12.5XH8.4IN D9.1IN NSL ORAL SMRT CAPNOLINE